# Patient Record
Sex: MALE | URBAN - NONMETROPOLITAN AREA
[De-identification: names, ages, dates, MRNs, and addresses within clinical notes are randomized per-mention and may not be internally consistent; named-entity substitution may affect disease eponyms.]

---

## 2017-01-31 ENCOUNTER — APPOINTMENT (RX ONLY)
Dept: URBAN - NONMETROPOLITAN AREA CLINIC 1 | Facility: CLINIC | Age: 77
Setting detail: DERMATOLOGY
End: 2017-01-31

## 2017-01-31 VITALS — HEIGHT: 70 IN | WEIGHT: 198 LBS

## 2017-01-31 DIAGNOSIS — L57.0 ACTINIC KERATOSIS: ICD-10-CM

## 2017-01-31 DIAGNOSIS — L57.8 OTHER SKIN CHANGES DUE TO CHRONIC EXPOSURE TO NONIONIZING RADIATION: ICD-10-CM

## 2017-01-31 DIAGNOSIS — L82.1 OTHER SEBORRHEIC KERATOSIS: ICD-10-CM

## 2017-01-31 DIAGNOSIS — L81.4 OTHER MELANIN HYPERPIGMENTATION: ICD-10-CM

## 2017-01-31 PROBLEM — I63.50 CEREBRAL INFARCTION DUE TO UNSPECIFIED OCCLUSION OR STENOSIS OF UNSPECIFIED CEREBRAL ARTERY: Status: ACTIVE | Noted: 2017-01-31

## 2017-01-31 PROCEDURE — ? LIQUID NITROGEN

## 2017-01-31 PROCEDURE — 99203 OFFICE O/P NEW LOW 30 MIN: CPT | Mod: 25

## 2017-01-31 PROCEDURE — ? DEFER

## 2017-01-31 PROCEDURE — 17003 DESTRUCT PREMALG LES 2-14: CPT

## 2017-01-31 PROCEDURE — ? PRESCRIPTION

## 2017-01-31 PROCEDURE — 17000 DESTRUCT PREMALG LESION: CPT

## 2017-01-31 PROCEDURE — ? COUNSELING

## 2017-01-31 RX ORDER — FLUOROURACIL 2 G/40G
CREAM TOPICAL BID
Qty: 1 | Refills: 1 | Status: ERX | COMMUNITY
Start: 2017-01-31

## 2017-01-31 RX ADMIN — FLUOROURACIL: 2 CREAM TOPICAL at 19:23

## 2017-01-31 ASSESSMENT — LOCATION DETAILED DESCRIPTION DERM
LOCATION DETAILED: RIGHT SUPERIOR UPPER BACK
LOCATION DETAILED: RIGHT LATERAL EYEBROW
LOCATION DETAILED: RIGHT LATERAL MALAR CHEEK
LOCATION DETAILED: LEFT LATERAL MALAR CHEEK
LOCATION DETAILED: LEFT LATERAL FOREHEAD
LOCATION DETAILED: RIGHT FOREHEAD
LOCATION DETAILED: LEFT LATERAL SUPERIOR CHEST
LOCATION DETAILED: LEFT INFERIOR CENTRAL MALAR CHEEK
LOCATION DETAILED: RIGHT INFERIOR TEMPLE
LOCATION DETAILED: LEFT MID TEMPLE

## 2017-01-31 ASSESSMENT — LOCATION ZONE DERM
LOCATION ZONE: TRUNK
LOCATION ZONE: FACE

## 2017-01-31 ASSESSMENT — LOCATION SIMPLE DESCRIPTION DERM
LOCATION SIMPLE: LEFT FOREHEAD
LOCATION SIMPLE: LEFT TEMPLE
LOCATION SIMPLE: LEFT CHEEK
LOCATION SIMPLE: RIGHT CHEEK
LOCATION SIMPLE: RIGHT EYEBROW
LOCATION SIMPLE: RIGHT FOREHEAD
LOCATION SIMPLE: RIGHT UPPER BACK
LOCATION SIMPLE: CHEST
LOCATION SIMPLE: RIGHT TEMPLE

## 2017-01-31 NOTE — PROCEDURE: LIQUID NITROGEN
Detail Level: Simple
Post-Care Instructions: I reviewed with the patient in detail post-care instructions. Patient is to wear sunprotection, and avoid picking at any of the treated lesions. Pt may apply Vaseline to crusted or scabbing areas.
Duration Of Freeze Thaw-Cycle (Seconds): 0
Render Post-Care Instructions In Note?: no
Consent: The patient's consent was obtained including but not limited to risks of crusting, scabbing, blistering, scarring, darker or lighter pigmentary change, recurrence, incomplete removal and infection.
Number Of Freeze-Thaw Cycles: 1 freeze-thaw cycle
Medical Necessity Information: It is in your best interest to select a reason for this procedure from the list below. All of these items fulfill various CMS LCD requirements except the new and changing color options.
Medical Necessity Clause: This procedure was medically necessary because the lesions that were treated were: not resolved
Detail Level: Detailed

## 2017-01-31 NOTE — PROCEDURE: MIPS QUALITY
Detail Level: Generalized
Quality 111:Pneumonia Vaccination Status For Older Adults: Pneumococcal Vaccination Previously Received
Quality 110: Preventive Care And Screening: Influenza Immunization: Influenza Immunization Administered during Influenza season

## 2021-01-13 DIAGNOSIS — Z23 NEED FOR VACCINATION: ICD-10-CM

## 2021-03-12 ENCOUNTER — APPOINTMENT (OUTPATIENT)
Dept: RADIOLOGY | Facility: MEDICAL CENTER | Age: 81
DRG: 247 | End: 2021-03-12
Attending: INTERNAL MEDICINE
Payer: MEDICARE

## 2021-03-12 ENCOUNTER — APPOINTMENT (OUTPATIENT)
Dept: RADIOLOGY | Facility: MEDICAL CENTER | Age: 81
DRG: 247 | End: 2021-03-12
Attending: EMERGENCY MEDICINE
Payer: MEDICARE

## 2021-03-12 ENCOUNTER — HOSPITAL ENCOUNTER (INPATIENT)
Facility: MEDICAL CENTER | Age: 81
LOS: 3 days | DRG: 247 | End: 2021-03-15
Attending: EMERGENCY MEDICINE | Admitting: HOSPITALIST
Payer: MEDICARE

## 2021-03-12 DIAGNOSIS — I25.10 CORONARY ARTERIOSCLEROSIS: ICD-10-CM

## 2021-03-12 DIAGNOSIS — N17.9 AKI (ACUTE KIDNEY INJURY) (HCC): ICD-10-CM

## 2021-03-12 PROBLEM — N18.31 STAGE 3A CHRONIC KIDNEY DISEASE: Status: ACTIVE | Noted: 2021-03-12

## 2021-03-12 PROBLEM — R73.9 HYPERGLYCEMIA: Status: ACTIVE | Noted: 2021-03-12

## 2021-03-12 PROBLEM — I21.19 ACUTE ST ELEVATION MYOCARDIAL INFARCTION (STEMI) OF INFERIOR WALL (HCC): Status: ACTIVE | Noted: 2021-03-12

## 2021-03-12 LAB
ALBUMIN SERPL BCP-MCNC: 4 G/DL (ref 3.2–4.9)
ALBUMIN SERPL BCP-MCNC: 4.1 G/DL (ref 3.2–4.9)
ALBUMIN/GLOB SERPL: 1.2 G/DL
ALBUMIN/GLOB SERPL: 1.3 G/DL
ALP SERPL-CCNC: 58 U/L (ref 30–99)
ALP SERPL-CCNC: 59 U/L (ref 30–99)
ALT SERPL-CCNC: 14 U/L (ref 2–50)
ALT SERPL-CCNC: 15 U/L (ref 2–50)
ANION GAP SERPL CALC-SCNC: 11 MMOL/L (ref 7–16)
ANION GAP SERPL CALC-SCNC: 13 MMOL/L (ref 7–16)
APTT PPP: 31.3 SEC (ref 24.7–36)
APTT PPP: 51.1 SEC (ref 24.7–36)
APTT PPP: >240 SEC (ref 24.7–36)
AST SERPL-CCNC: 20 U/L (ref 12–45)
AST SERPL-CCNC: 31 U/L (ref 12–45)
BASOPHILS # BLD AUTO: 0.7 % (ref 0–1.8)
BASOPHILS # BLD: 0.07 K/UL (ref 0–0.12)
BILIRUB SERPL-MCNC: 0.3 MG/DL (ref 0.1–1.5)
BILIRUB SERPL-MCNC: 0.4 MG/DL (ref 0.1–1.5)
BUN SERPL-MCNC: 24 MG/DL (ref 8–22)
BUN SERPL-MCNC: 25 MG/DL (ref 8–22)
CALCIUM SERPL-MCNC: 9.5 MG/DL (ref 8.5–10.5)
CALCIUM SERPL-MCNC: 9.6 MG/DL (ref 8.5–10.5)
CHLORIDE SERPL-SCNC: 102 MMOL/L (ref 96–112)
CHLORIDE SERPL-SCNC: 104 MMOL/L (ref 96–112)
CO2 SERPL-SCNC: 23 MMOL/L (ref 20–33)
CO2 SERPL-SCNC: 23 MMOL/L (ref 20–33)
CREAT SERPL-MCNC: 1.4 MG/DL (ref 0.5–1.4)
CREAT SERPL-MCNC: 1.42 MG/DL (ref 0.5–1.4)
EKG IMPRESSION: NORMAL
EKG IMPRESSION: NORMAL
EOSINOPHIL # BLD AUTO: 0.09 K/UL (ref 0–0.51)
EOSINOPHIL NFR BLD: 0.9 % (ref 0–6.9)
ERYTHROCYTE [DISTWIDTH] IN BLOOD BY AUTOMATED COUNT: 42 FL (ref 35.9–50)
ERYTHROCYTE [DISTWIDTH] IN BLOOD BY AUTOMATED COUNT: 42.2 FL (ref 35.9–50)
EST. AVERAGE GLUCOSE BLD GHB EST-MCNC: 120 MG/DL
GLOBULIN SER CALC-MCNC: 3.1 G/DL (ref 1.9–3.5)
GLOBULIN SER CALC-MCNC: 3.3 G/DL (ref 1.9–3.5)
GLUCOSE SERPL-MCNC: 116 MG/DL (ref 65–99)
GLUCOSE SERPL-MCNC: 124 MG/DL (ref 65–99)
HBA1C MFR BLD: 5.8 % (ref 4–5.6)
HCT VFR BLD AUTO: 43.6 % (ref 42–52)
HCT VFR BLD AUTO: 45 % (ref 42–52)
HGB BLD-MCNC: 14.4 G/DL (ref 14–18)
HGB BLD-MCNC: 14.5 G/DL (ref 14–18)
IMM GRANULOCYTES # BLD AUTO: 0.04 K/UL (ref 0–0.11)
IMM GRANULOCYTES NFR BLD AUTO: 0.4 % (ref 0–0.9)
INR PPP: 1.04 (ref 0.87–1.13)
INR PPP: 1.11 (ref 0.87–1.13)
INR PPP: 1.16 (ref 0.87–1.13)
LYMPHOCYTES # BLD AUTO: 1.12 K/UL (ref 1–4.8)
LYMPHOCYTES NFR BLD: 10.6 % (ref 22–41)
MAGNESIUM SERPL-MCNC: 2.2 MG/DL (ref 1.5–2.5)
MCH RBC QN AUTO: 27.8 PG (ref 27–33)
MCH RBC QN AUTO: 28.2 PG (ref 27–33)
MCHC RBC AUTO-ENTMCNC: 32.2 G/DL (ref 33.7–35.3)
MCHC RBC AUTO-ENTMCNC: 33 G/DL (ref 33.7–35.3)
MCV RBC AUTO: 85.5 FL (ref 81.4–97.8)
MCV RBC AUTO: 86.4 FL (ref 81.4–97.8)
MONOCYTES # BLD AUTO: 0.35 K/UL (ref 0–0.85)
MONOCYTES NFR BLD AUTO: 3.3 % (ref 0–13.4)
NEUTROPHILS # BLD AUTO: 8.85 K/UL (ref 1.82–7.42)
NEUTROPHILS NFR BLD: 84.1 % (ref 44–72)
NRBC # BLD AUTO: 0 K/UL
NRBC BLD-RTO: 0 /100 WBC
PLATELET # BLD AUTO: 229 K/UL (ref 164–446)
PLATELET # BLD AUTO: 257 K/UL (ref 164–446)
PMV BLD AUTO: 10.4 FL (ref 9–12.9)
PMV BLD AUTO: 11 FL (ref 9–12.9)
POTASSIUM SERPL-SCNC: 4.3 MMOL/L (ref 3.6–5.5)
POTASSIUM SERPL-SCNC: 5.2 MMOL/L (ref 3.6–5.5)
PROT SERPL-MCNC: 7.2 G/DL (ref 6–8.2)
PROT SERPL-MCNC: 7.3 G/DL (ref 6–8.2)
PROTHROMBIN TIME: 13.9 SEC (ref 12–14.6)
PROTHROMBIN TIME: 14.6 SEC (ref 12–14.6)
PROTHROMBIN TIME: 15.2 SEC (ref 12–14.6)
RBC # BLD AUTO: 5.1 M/UL (ref 4.7–6.1)
RBC # BLD AUTO: 5.21 M/UL (ref 4.7–6.1)
SARS-COV+SARS-COV-2 AG RESP QL IA.RAPID: NOTDETECTED
SARS-COV-2 RNA RESP QL NAA+PROBE: NOTDETECTED
SODIUM SERPL-SCNC: 138 MMOL/L (ref 135–145)
SODIUM SERPL-SCNC: 138 MMOL/L (ref 135–145)
SPECIMEN SOURCE: NORMAL
SPECIMEN SOURCE: NORMAL
TROPONIN T SERPL-MCNC: 29 NG/L (ref 6–19)
TROPONIN T SERPL-MCNC: 73 NG/L (ref 6–19)
UFH PPP CHRO-ACNC: <0.1 IU/ML
UFH PPP CHRO-ACNC: <0.1 IU/ML
UFH PPP CHRO-ACNC: >1.1 IU/ML
WBC # BLD AUTO: 10.5 K/UL (ref 4.8–10.8)
WBC # BLD AUTO: 10.5 K/UL (ref 4.8–10.8)

## 2021-03-12 PROCEDURE — U0003 INFECTIOUS AGENT DETECTION BY NUCLEIC ACID (DNA OR RNA); SEVERE ACUTE RESPIRATORY SYNDROME CORONAVIRUS 2 (SARS-COV-2) (CORONAVIRUS DISEASE [COVID-19]), AMPLIFIED PROBE TECHNIQUE, MAKING USE OF HIGH THROUGHPUT TECHNOLOGIES AS DESCRIBED BY CMS-2020-01-R: HCPCS

## 2021-03-12 PROCEDURE — 700111 HCHG RX REV CODE 636 W/ 250 OVERRIDE (IP): Performed by: INTERNAL MEDICINE

## 2021-03-12 PROCEDURE — 85025 COMPLETE CBC W/AUTO DIFF WBC: CPT

## 2021-03-12 PROCEDURE — 700105 HCHG RX REV CODE 258: Performed by: NURSE PRACTITIONER

## 2021-03-12 PROCEDURE — 93005 ELECTROCARDIOGRAM TRACING: CPT | Performed by: EMERGENCY MEDICINE

## 2021-03-12 PROCEDURE — U0005 INFEC AGEN DETEC AMPLI PROBE: HCPCS

## 2021-03-12 PROCEDURE — 770020 HCHG ROOM/CARE - TELE (206)

## 2021-03-12 PROCEDURE — 99223 1ST HOSP IP/OBS HIGH 75: CPT | Mod: AI | Performed by: HOSPITALIST

## 2021-03-12 PROCEDURE — 85610 PROTHROMBIN TIME: CPT

## 2021-03-12 PROCEDURE — 87426 SARSCOV CORONAVIRUS AG IA: CPT

## 2021-03-12 PROCEDURE — 84484 ASSAY OF TROPONIN QUANT: CPT

## 2021-03-12 PROCEDURE — 99223 1ST HOSP IP/OBS HIGH 75: CPT | Performed by: INTERNAL MEDICINE

## 2021-03-12 PROCEDURE — 85027 COMPLETE CBC AUTOMATED: CPT

## 2021-03-12 PROCEDURE — 96366 THER/PROPH/DIAG IV INF ADDON: CPT

## 2021-03-12 PROCEDURE — 71045 X-RAY EXAM CHEST 1 VIEW: CPT

## 2021-03-12 PROCEDURE — 80053 COMPREHEN METABOLIC PANEL: CPT

## 2021-03-12 PROCEDURE — 96365 THER/PROPH/DIAG IV INF INIT: CPT

## 2021-03-12 PROCEDURE — 83036 HEMOGLOBIN GLYCOSYLATED A1C: CPT

## 2021-03-12 PROCEDURE — 85730 THROMBOPLASTIN TIME PARTIAL: CPT

## 2021-03-12 PROCEDURE — 36415 COLL VENOUS BLD VENIPUNCTURE: CPT

## 2021-03-12 PROCEDURE — 93005 ELECTROCARDIOGRAM TRACING: CPT | Performed by: HOSPITALIST

## 2021-03-12 PROCEDURE — C9803 HOPD COVID-19 SPEC COLLECT: HCPCS | Performed by: EMERGENCY MEDICINE

## 2021-03-12 PROCEDURE — 99285 EMERGENCY DEPT VISIT HI MDM: CPT

## 2021-03-12 PROCEDURE — 83735 ASSAY OF MAGNESIUM: CPT

## 2021-03-12 PROCEDURE — 85520 HEPARIN ASSAY: CPT

## 2021-03-12 RX ORDER — AMOXICILLIN 250 MG
2 CAPSULE ORAL 2 TIMES DAILY
Status: DISCONTINUED | OUTPATIENT
Start: 2021-03-12 | End: 2021-03-15 | Stop reason: HOSPADM

## 2021-03-12 RX ORDER — BISACODYL 10 MG
10 SUPPOSITORY, RECTAL RECTAL
Status: DISCONTINUED | OUTPATIENT
Start: 2021-03-12 | End: 2021-03-15 | Stop reason: HOSPADM

## 2021-03-12 RX ORDER — ASPIRIN 300 MG/1
300 SUPPOSITORY RECTAL DAILY
Status: DISCONTINUED | OUTPATIENT
Start: 2021-03-13 | End: 2021-03-12

## 2021-03-12 RX ORDER — MORPHINE SULFATE 4 MG/ML
2-4 INJECTION, SOLUTION INTRAMUSCULAR; INTRAVENOUS
Status: DISCONTINUED | OUTPATIENT
Start: 2021-03-12 | End: 2021-03-15 | Stop reason: HOSPADM

## 2021-03-12 RX ORDER — ONDANSETRON 2 MG/ML
4 INJECTION INTRAMUSCULAR; INTRAVENOUS EVERY 4 HOURS PRN
Status: DISCONTINUED | OUTPATIENT
Start: 2021-03-12 | End: 2021-03-15 | Stop reason: HOSPADM

## 2021-03-12 RX ORDER — CEFADROXIL 500 MG/1
500 CAPSULE ORAL EVERY 12 HOURS
Status: DISCONTINUED | OUTPATIENT
Start: 2021-03-12 | End: 2021-03-12

## 2021-03-12 RX ORDER — HEPARIN SODIUM 1000 [USP'U]/ML
2000 INJECTION, SOLUTION INTRAVENOUS; SUBCUTANEOUS PRN
Status: DISCONTINUED | OUTPATIENT
Start: 2021-03-12 | End: 2021-03-14

## 2021-03-12 RX ORDER — ACETAMINOPHEN 325 MG/1
650 TABLET ORAL EVERY 6 HOURS PRN
Status: DISCONTINUED | OUTPATIENT
Start: 2021-03-12 | End: 2021-03-15 | Stop reason: HOSPADM

## 2021-03-12 RX ORDER — TAMSULOSIN HYDROCHLORIDE 0.4 MG/1
0.4 CAPSULE ORAL 2 TIMES DAILY
Status: DISCONTINUED | OUTPATIENT
Start: 2021-03-12 | End: 2021-03-15 | Stop reason: HOSPADM

## 2021-03-12 RX ORDER — POLYETHYLENE GLYCOL 3350 17 G/17G
1 POWDER, FOR SOLUTION ORAL
Status: DISCONTINUED | OUTPATIENT
Start: 2021-03-12 | End: 2021-03-15 | Stop reason: HOSPADM

## 2021-03-12 RX ORDER — ONDANSETRON 4 MG/1
4 TABLET, ORALLY DISINTEGRATING ORAL EVERY 4 HOURS PRN
Status: DISCONTINUED | OUTPATIENT
Start: 2021-03-12 | End: 2021-03-15 | Stop reason: HOSPADM

## 2021-03-12 RX ORDER — MULTIVIT-MIN/IRON/FOLIC ACID/K 18-600-40
2000 CAPSULE ORAL DAILY
COMMUNITY

## 2021-03-12 RX ORDER — HEPARIN SODIUM 5000 [USP'U]/100ML
0-30 INJECTION, SOLUTION INTRAVENOUS CONTINUOUS
Status: DISCONTINUED | OUTPATIENT
Start: 2021-03-12 | End: 2021-03-14

## 2021-03-12 RX ORDER — SODIUM CHLORIDE 9 MG/ML
INJECTION, SOLUTION INTRAVENOUS CONTINUOUS
Status: DISCONTINUED | OUTPATIENT
Start: 2021-03-12 | End: 2021-03-13

## 2021-03-12 RX ORDER — CARVEDILOL 6.25 MG/1
6.25 TABLET ORAL 2 TIMES DAILY WITH MEALS
Status: DISCONTINUED | OUTPATIENT
Start: 2021-03-12 | End: 2021-03-15 | Stop reason: HOSPADM

## 2021-03-12 RX ORDER — ATORVASTATIN CALCIUM 20 MG/1
80 TABLET, FILM COATED ORAL EVERY EVENING
Status: DISCONTINUED | OUTPATIENT
Start: 2021-03-12 | End: 2021-03-12

## 2021-03-12 RX ORDER — ROSUVASTATIN CALCIUM 20 MG/1
40 TABLET, COATED ORAL EVERY EVENING
Status: DISCONTINUED | OUTPATIENT
Start: 2021-03-13 | End: 2021-03-15 | Stop reason: HOSPADM

## 2021-03-12 RX ORDER — ASPIRIN 325 MG
325 TABLET ORAL DAILY
Status: DISCONTINUED | OUTPATIENT
Start: 2021-03-13 | End: 2021-03-12

## 2021-03-12 RX ORDER — CEPHALEXIN 500 MG/1
500 CAPSULE ORAL 4 TIMES DAILY
Status: DISCONTINUED | OUTPATIENT
Start: 2021-03-12 | End: 2021-03-13

## 2021-03-12 RX ORDER — TAMSULOSIN HYDROCHLORIDE 0.4 MG/1
0.4 CAPSULE ORAL 2 TIMES DAILY
COMMUNITY
Start: 2021-01-19

## 2021-03-12 RX ORDER — ASPIRIN 81 MG/1
324 TABLET, CHEWABLE ORAL DAILY
Status: DISCONTINUED | OUTPATIENT
Start: 2021-03-13 | End: 2021-03-12

## 2021-03-12 RX ORDER — CEFADROXIL 500 MG/1
500 CAPSULE ORAL EVERY 12 HOURS
Status: ON HOLD | COMMUNITY
Start: 2021-03-11 | End: 2021-03-15

## 2021-03-12 RX ORDER — NITROGLYCERIN 0.4 MG/1
0.4 TABLET SUBLINGUAL
Status: DISCONTINUED | OUTPATIENT
Start: 2021-03-12 | End: 2021-03-15 | Stop reason: HOSPADM

## 2021-03-12 RX ADMIN — HEPARIN SODIUM 2000 UNITS: 1000 INJECTION, SOLUTION INTRAVENOUS; SUBCUTANEOUS at 23:12

## 2021-03-12 RX ADMIN — SODIUM CHLORIDE: 9 INJECTION, SOLUTION INTRAVENOUS at 21:43

## 2021-03-12 RX ADMIN — HEPARIN SODIUM 11.72 UNITS/KG/HR: 5000 INJECTION, SOLUTION INTRAVENOUS; SUBCUTANEOUS at 16:08

## 2021-03-12 RX ADMIN — HEPARIN SODIUM 15.7 UNITS/KG/HR: 5000 INJECTION, SOLUTION INTRAVENOUS; SUBCUTANEOUS at 23:13

## 2021-03-12 ASSESSMENT — ENCOUNTER SYMPTOMS
SHORTNESS OF BREATH: 0
DIARRHEA: 0
SORE THROAT: 0
LOSS OF CONSCIOUSNESS: 0
HEADACHES: 0
CHILLS: 0
PALPITATIONS: 0
COUGH: 0
BACK PAIN: 0
VOMITING: 0
DIZZINESS: 0
NAUSEA: 0
ABDOMINAL PAIN: 0
BLURRED VISION: 0
DOUBLE VISION: 0
FEVER: 0

## 2021-03-12 ASSESSMENT — PATIENT HEALTH QUESTIONNAIRE - PHQ9
SUM OF ALL RESPONSES TO PHQ9 QUESTIONS 1 AND 2: 0
2. FEELING DOWN, DEPRESSED, IRRITABLE, OR HOPELESS: NOT AT ALL
1. LITTLE INTEREST OR PLEASURE IN DOING THINGS: NOT AT ALL

## 2021-03-12 ASSESSMENT — COGNITIVE AND FUNCTIONAL STATUS - GENERAL
SUGGESTED CMS G CODE MODIFIER DAILY ACTIVITY: CH
SUGGESTED CMS G CODE MODIFIER MOBILITY: CH
DAILY ACTIVITIY SCORE: 24
MOBILITY SCORE: 24

## 2021-03-12 ASSESSMENT — LIFESTYLE VARIABLES
ALCOHOL_USE: YES
TOTAL SCORE: 0
DOES PATIENT WANT TO STOP DRINKING: NO
HAVE YOU EVER FELT YOU SHOULD CUT DOWN ON YOUR DRINKING: NO
HAVE PEOPLE ANNOYED YOU BY CRITICIZING YOUR DRINKING: NO
EVER HAD A DRINK FIRST THING IN THE MORNING TO STEADY YOUR NERVES TO GET RID OF A HANGOVER: NO
ON A TYPICAL DAY WHEN YOU DRINK ALCOHOL HOW MANY DRINKS DO YOU HAVE: 1
HOW MANY TIMES IN THE PAST YEAR HAVE YOU HAD 5 OR MORE DRINKS IN A DAY: 0
AVERAGE NUMBER OF DAYS PER WEEK YOU HAVE A DRINK CONTAINING ALCOHOL: 3
TOTAL SCORE: 0
EVER FELT BAD OR GUILTY ABOUT YOUR DRINKING: NO
TOTAL SCORE: 0
CONSUMPTION TOTAL: NEGATIVE

## 2021-03-12 ASSESSMENT — FIBROSIS 4 INDEX
FIB4 SCORE: 1.63
FIB4 SCORE: 1.63

## 2021-03-12 ASSESSMENT — PAIN DESCRIPTION - PAIN TYPE: TYPE: ACUTE PAIN

## 2021-03-12 NOTE — ED TRIAGE NOTES
"Chief Complaint   Patient presents with   • Chest Pain     Pt started having chest pain after he was working out. Pt drove to [facility] where they did a chest pain workup discovering a inferior STEMI. Pt was then transferred here for IR. Pt is currently chest pain free. Pt recieved metoprolol, aspirin, nitro x3 and heparin. Cardiology and ERP at bedside upon arrival.      BP (!) 173/91   Pulse 67   Temp 37.1 °C (98.8 °F) (Temporal)   Resp 16   Ht 1.74 m (5' 8.5\")   Wt 85.3 kg (188 lb)   SpO2 93%   BMI 28.17 kg/m²     Patient was a transfer from [facility]. Pt here for inferior STEMI. Pt not having chest pain so per cardiologist, pt will got to IR tomorrow unless chest pain returns.   "

## 2021-03-13 ENCOUNTER — APPOINTMENT (OUTPATIENT)
Dept: CARDIOLOGY | Facility: MEDICAL CENTER | Age: 81
DRG: 247 | End: 2021-03-13
Attending: HOSPITALIST
Payer: MEDICARE

## 2021-03-13 ENCOUNTER — APPOINTMENT (OUTPATIENT)
Dept: CARDIOLOGY | Facility: MEDICAL CENTER | Age: 81
DRG: 247 | End: 2021-03-13
Attending: NURSE PRACTITIONER
Payer: MEDICARE

## 2021-03-13 PROBLEM — N17.9 AKI (ACUTE KIDNEY INJURY) (HCC): Status: ACTIVE | Noted: 2021-03-13

## 2021-03-13 LAB
ANION GAP SERPL CALC-SCNC: 12 MMOL/L (ref 7–16)
BUN SERPL-MCNC: 27 MG/DL (ref 8–22)
CALCIUM SERPL-MCNC: 9.6 MG/DL (ref 8.5–10.5)
CHLORIDE SERPL-SCNC: 105 MMOL/L (ref 96–112)
CHOLEST SERPL-MCNC: 159 MG/DL (ref 100–199)
CO2 SERPL-SCNC: 22 MMOL/L (ref 20–33)
CREAT SERPL-MCNC: 1.47 MG/DL (ref 0.5–1.4)
EKG IMPRESSION: NORMAL
ERYTHROCYTE [DISTWIDTH] IN BLOOD BY AUTOMATED COUNT: 40.9 FL (ref 35.9–50)
GLUCOSE SERPL-MCNC: 99 MG/DL (ref 65–99)
HCT VFR BLD AUTO: 42 % (ref 42–52)
HDLC SERPL-MCNC: 45 MG/DL
HGB BLD-MCNC: 14 G/DL (ref 14–18)
LDLC SERPL CALC-MCNC: 91 MG/DL
LV EJECT FRACT  99904: 60
LV EJECT FRACT MOD 2C 99903: 49.48
LV EJECT FRACT MOD 4C 99902: 75.24
LV EJECT FRACT MOD BP 99901: 63.18
MCH RBC QN AUTO: 27.8 PG (ref 27–33)
MCHC RBC AUTO-ENTMCNC: 33.3 G/DL (ref 33.7–35.3)
MCV RBC AUTO: 83.3 FL (ref 81.4–97.8)
PLATELET # BLD AUTO: 241 K/UL (ref 164–446)
PMV BLD AUTO: 10.6 FL (ref 9–12.9)
POTASSIUM SERPL-SCNC: 3.9 MMOL/L (ref 3.6–5.5)
RBC # BLD AUTO: 5.04 M/UL (ref 4.7–6.1)
SODIUM SERPL-SCNC: 139 MMOL/L (ref 135–145)
TRIGL SERPL-MCNC: 116 MG/DL (ref 0–149)
TROPONIN T SERPL-MCNC: 280 NG/L (ref 6–19)
UFH PPP CHRO-ACNC: 0.48 IU/ML
UFH PPP CHRO-ACNC: 0.84 IU/ML
UFH PPP CHRO-ACNC: <0.1 IU/ML
WBC # BLD AUTO: 7 K/UL (ref 4.8–10.8)

## 2021-03-13 PROCEDURE — 99232 SBSQ HOSP IP/OBS MODERATE 35: CPT | Performed by: STUDENT IN AN ORGANIZED HEALTH CARE EDUCATION/TRAINING PROGRAM

## 2021-03-13 PROCEDURE — 85027 COMPLETE CBC AUTOMATED: CPT

## 2021-03-13 PROCEDURE — 93005 ELECTROCARDIOGRAM TRACING: CPT | Performed by: INTERNAL MEDICINE

## 2021-03-13 PROCEDURE — 770020 HCHG ROOM/CARE - TELE (206)

## 2021-03-13 PROCEDURE — 93010 ELECTROCARDIOGRAM REPORT: CPT | Mod: 59 | Performed by: INTERNAL MEDICINE

## 2021-03-13 PROCEDURE — 700102 HCHG RX REV CODE 250 W/ 637 OVERRIDE(OP): Performed by: HOSPITALIST

## 2021-03-13 PROCEDURE — 92928 PRQ TCAT PLMT NTRAC ST 1 LES: CPT | Mod: LC | Performed by: INTERNAL MEDICINE

## 2021-03-13 PROCEDURE — 93010 ELECTROCARDIOGRAM REPORT: CPT | Mod: 59,76 | Performed by: INTERNAL MEDICINE

## 2021-03-13 PROCEDURE — 99232 SBSQ HOSP IP/OBS MODERATE 35: CPT | Mod: 25 | Performed by: INTERNAL MEDICINE

## 2021-03-13 PROCEDURE — 93306 TTE W/DOPPLER COMPLETE: CPT | Mod: 26 | Performed by: INTERNAL MEDICINE

## 2021-03-13 PROCEDURE — 700117 HCHG RX CONTRAST REV CODE 255: Performed by: INTERNAL MEDICINE

## 2021-03-13 PROCEDURE — 700111 HCHG RX REV CODE 636 W/ 250 OVERRIDE (IP)

## 2021-03-13 PROCEDURE — B2111ZZ FLUOROSCOPY OF MULTIPLE CORONARY ARTERIES USING LOW OSMOLAR CONTRAST: ICD-10-PCS | Performed by: INTERNAL MEDICINE

## 2021-03-13 PROCEDURE — 93458 L HRT ARTERY/VENTRICLE ANGIO: CPT | Mod: 26,59 | Performed by: INTERNAL MEDICINE

## 2021-03-13 PROCEDURE — 94760 N-INVAS EAR/PLS OXIMETRY 1: CPT

## 2021-03-13 PROCEDURE — B2151ZZ FLUOROSCOPY OF LEFT HEART USING LOW OSMOLAR CONTRAST: ICD-10-PCS | Performed by: INTERNAL MEDICINE

## 2021-03-13 PROCEDURE — 4A023N7 MEASUREMENT OF CARDIAC SAMPLING AND PRESSURE, LEFT HEART, PERCUTANEOUS APPROACH: ICD-10-PCS | Performed by: INTERNAL MEDICINE

## 2021-03-13 PROCEDURE — A9270 NON-COVERED ITEM OR SERVICE: HCPCS | Performed by: INTERNAL MEDICINE

## 2021-03-13 PROCEDURE — A9270 NON-COVERED ITEM OR SERVICE: HCPCS | Performed by: HOSPITALIST

## 2021-03-13 PROCEDURE — 84484 ASSAY OF TROPONIN QUANT: CPT

## 2021-03-13 PROCEDURE — 93005 ELECTROCARDIOGRAM TRACING: CPT | Performed by: STUDENT IN AN ORGANIZED HEALTH CARE EDUCATION/TRAINING PROGRAM

## 2021-03-13 PROCEDURE — 99153 MOD SED SAME PHYS/QHP EA: CPT

## 2021-03-13 PROCEDURE — 85520 HEPARIN ASSAY: CPT | Mod: 91

## 2021-03-13 PROCEDURE — 80048 BASIC METABOLIC PNL TOTAL CA: CPT

## 2021-03-13 PROCEDURE — 027034Z DILATION OF CORONARY ARTERY, ONE ARTERY WITH DRUG-ELUTING INTRALUMINAL DEVICE, PERCUTANEOUS APPROACH: ICD-10-PCS | Performed by: INTERNAL MEDICINE

## 2021-03-13 PROCEDURE — 80061 LIPID PANEL: CPT

## 2021-03-13 PROCEDURE — 36415 COLL VENOUS BLD VENIPUNCTURE: CPT

## 2021-03-13 PROCEDURE — 700102 HCHG RX REV CODE 250 W/ 637 OVERRIDE(OP): Performed by: INTERNAL MEDICINE

## 2021-03-13 PROCEDURE — 700101 HCHG RX REV CODE 250

## 2021-03-13 PROCEDURE — 93306 TTE W/DOPPLER COMPLETE: CPT

## 2021-03-13 PROCEDURE — 99152 MOD SED SAME PHYS/QHP 5/>YRS: CPT | Performed by: INTERNAL MEDICINE

## 2021-03-13 RX ORDER — LIDOCAINE HYDROCHLORIDE 20 MG/ML
INJECTION, SOLUTION INFILTRATION; PERINEURAL
Status: COMPLETED
Start: 2021-03-13 | End: 2021-03-13

## 2021-03-13 RX ORDER — HEPARIN SODIUM 200 [USP'U]/100ML
INJECTION, SOLUTION INTRAVENOUS
Status: COMPLETED
Start: 2021-03-13 | End: 2021-03-13

## 2021-03-13 RX ORDER — HEPARIN SODIUM 1000 [USP'U]/ML
INJECTION, SOLUTION INTRAVENOUS; SUBCUTANEOUS
Status: COMPLETED
Start: 2021-03-13 | End: 2021-03-13

## 2021-03-13 RX ORDER — BIVALIRUDIN 250 MG/5ML
INJECTION, POWDER, LYOPHILIZED, FOR SOLUTION INTRAVENOUS
Status: COMPLETED
Start: 2021-03-13 | End: 2021-03-13

## 2021-03-13 RX ORDER — MIDAZOLAM HYDROCHLORIDE 1 MG/ML
INJECTION INTRAMUSCULAR; INTRAVENOUS
Status: COMPLETED
Start: 2021-03-13 | End: 2021-03-13

## 2021-03-13 RX ORDER — SODIUM CHLORIDE, SODIUM LACTATE, POTASSIUM CHLORIDE, CALCIUM CHLORIDE 600; 310; 30; 20 MG/100ML; MG/100ML; MG/100ML; MG/100ML
1000 INJECTION, SOLUTION INTRAVENOUS CONTINUOUS
Status: DISCONTINUED | OUTPATIENT
Start: 2021-03-13 | End: 2021-03-13

## 2021-03-13 RX ORDER — VERAPAMIL HYDROCHLORIDE 2.5 MG/ML
INJECTION, SOLUTION INTRAVENOUS
Status: COMPLETED
Start: 2021-03-13 | End: 2021-03-13

## 2021-03-13 RX ADMIN — TICAGRELOR 180 MG: 90 TABLET ORAL at 00:42

## 2021-03-13 RX ADMIN — TAMSULOSIN HYDROCHLORIDE 0.4 MG: 0.4 CAPSULE ORAL at 07:07

## 2021-03-13 RX ADMIN — HEPARIN SODIUM: 1000 INJECTION, SOLUTION INTRAVENOUS; SUBCUTANEOUS at 10:05

## 2021-03-13 RX ADMIN — CARVEDILOL 6.25 MG: 6.25 TABLET, FILM COATED ORAL at 07:07

## 2021-03-13 RX ADMIN — MIDAZOLAM HYDROCHLORIDE 0.5 MG: 1 INJECTION, SOLUTION INTRAMUSCULAR; INTRAVENOUS at 11:09

## 2021-03-13 RX ADMIN — TAMSULOSIN HYDROCHLORIDE 0.4 MG: 0.4 CAPSULE ORAL at 17:10

## 2021-03-13 RX ADMIN — BIVALIRUDIN 250 MG: 250 INJECTION, POWDER, LYOPHILIZED, FOR SOLUTION INTRAVENOUS at 10:26

## 2021-03-13 RX ADMIN — MIDAZOLAM HYDROCHLORIDE 2 MG: 1 INJECTION, SOLUTION INTRAMUSCULAR; INTRAVENOUS at 10:10

## 2021-03-13 RX ADMIN — CEPHALEXIN 500 MG: 500 CAPSULE ORAL at 07:08

## 2021-03-13 RX ADMIN — ASPIRIN 81 MG: 81 TABLET, COATED ORAL at 07:10

## 2021-03-13 RX ADMIN — FENTANYL CITRATE 25 MCG: 50 INJECTION, SOLUTION INTRAMUSCULAR; INTRAVENOUS at 11:09

## 2021-03-13 RX ADMIN — CARVEDILOL 6.25 MG: 6.25 TABLET, FILM COATED ORAL at 17:10

## 2021-03-13 RX ADMIN — FENTANYL CITRATE 100 MCG: 50 INJECTION, SOLUTION INTRAMUSCULAR; INTRAVENOUS at 10:10

## 2021-03-13 RX ADMIN — IOHEXOL 194 ML: 350 INJECTION, SOLUTION INTRAVENOUS at 11:10

## 2021-03-13 RX ADMIN — LIDOCAINE HYDROCHLORIDE: 20 INJECTION, SOLUTION INFILTRATION; PERINEURAL at 10:10

## 2021-03-13 RX ADMIN — TICAGRELOR 90 MG: 90 TABLET ORAL at 07:07

## 2021-03-13 RX ADMIN — HEPARIN SODIUM 2000 UNITS: 1000 INJECTION, SOLUTION INTRAVENOUS; SUBCUTANEOUS at 07:13

## 2021-03-13 RX ADMIN — CEPHALEXIN 500 MG: 500 CAPSULE ORAL at 12:19

## 2021-03-13 RX ADMIN — TICAGRELOR 90 MG: 90 TABLET ORAL at 17:10

## 2021-03-13 RX ADMIN — VERAPAMIL HYDROCHLORIDE 2.5 MG: 2.5 INJECTION, SOLUTION INTRAVENOUS at 10:10

## 2021-03-13 RX ADMIN — ROSUVASTATIN CALCIUM 40 MG: 20 TABLET, FILM COATED ORAL at 17:10

## 2021-03-13 RX ADMIN — NITROGLYCERIN 10 ML: 20 INJECTION INTRAVENOUS at 10:10

## 2021-03-13 RX ADMIN — HEPARIN SODIUM 2000 UNITS: 200 INJECTION, SOLUTION INTRAVENOUS at 10:10

## 2021-03-13 ASSESSMENT — COPD QUESTIONNAIRES
DURING THE PAST 4 WEEKS HOW MUCH DID YOU FEEL SHORT OF BREATH: NONE/LITTLE OF THE TIME
HAVE YOU SMOKED AT LEAST 100 CIGARETTES IN YOUR ENTIRE LIFE: NO/DON'T KNOW
DO YOU EVER COUGH UP ANY MUCUS OR PHLEGM?: NO/ONLY WITH OCCASIONAL COLDS OR INFECTIONS
COPD SCREENING SCORE: 2

## 2021-03-13 ASSESSMENT — FIBROSIS 4 INDEX: FIB4 SCORE: 2.78

## 2021-03-13 ASSESSMENT — ENCOUNTER SYMPTOMS
FEVER: 0
VOMITING: 0
NAUSEA: 0
CHEST TIGHTNESS: 0
HEADACHES: 0
CHILLS: 0
COUGH: 0
PALPITATIONS: 0
SHORTNESS OF BREATH: 0
DIZZINESS: 0
WHEEZING: 0

## 2021-03-13 ASSESSMENT — PAIN DESCRIPTION - PAIN TYPE: TYPE: ACUTE PAIN

## 2021-03-13 NOTE — ED NOTES
Pt able to transfer to side of bed to use urinal, still no c/o chest pain.    Hospitalist then to bedside and updated pt and wife about plan of care.

## 2021-03-13 NOTE — DISCHARGE PLANNING
STEMI Response    Referral: Code STEMI Response    Intervention: SW responded to Stemi.  Pt was BIB [facility] from [facility].  Pt was alert upon arrival.  Pts name is Simon Orozco (: 1940).  SW obtained the following pt information: Pt reportedly drove himself to [facility] with complaints of chest pain.  Pt's Spouse Ramona arrived shortly after the Pt ([phone]).  SW escorted Ramona to Pt bedside once Pt was roomed in ER.     Plan: SW will continue to monitor and assist if needs arise.

## 2021-03-13 NOTE — CONSULTS
Consults   Cardiology Initial Consultation    Date of Service  3/12/2021    Referring Physician  Jg Chau, *    Reason for Consultation  Inferior ST elevation my    History of Presenting Illness  Simon Orozco is a 81 y.o. male who presented to [facility] on 3/12/2021 as a transfer from [facility] for sudden chest pain and inferior, possible posterior ST elevation MI.  Patient was exercising, complete exercise when he had sudden pains across the chest.  Presented to the emergency room where he had inferior ST elevation.  Chest pain resolved with nitroglycerin.  EKG started to improve.  Then chest pain returned with ST elevation.  Patient was given 300 Plavix load, 4000 of IV heparin and transferred via helicopter to [facility].  He was chest pain-free prior to leaving [facility], remained chest pain-free inflight and here in our emergency room.  ECG from outside hospital shows inferior ST elevation, early R wave progression, possible posterior MI.  ECG returning to baseline here, again patient chest pain-free.    Has had previous stroke x2.  No prior coronary disease.    Previous stroke treatment included Lipitor and Plavix which she reports intolerance to both.    Lives in [place] with his wife.  No significant shortness of breath, lower extremity edema, orthopnea, palpitations, lightheadedness or syncope.    Has been taking aspirin chronically was recently held for prostate procedure.  Recently diagnosed with prostate cancer, started on hormone therapy with plans for radiation.  No maría problems with hematuria.    Labs from outside hospital show stable hemoglobin and platelets.  Creatinine 1.64 with GFR in the 30s.    After stabilization in the emergency room, his systolic blood pressure was noted to be 175.  No return of chest pain.    Review of Systems  Review of Systems    All systems reviewed and negative.    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 He also has no past medical history of Anesthesia, Anginal syndrome (HCC), Arrhythmia, Asthma, At risk for sleep apnea, Blood clotting disorder (HCC), Breath shortness, Bronchitis, Cancer (HCC), Cataract, Cold, Congestive heart failure (HCC), Dental disorder, Emphysema of lung (HCC), Glaucoma, Heart burn, Heart murmur, Heart valve disease, Hemorrhagic disorder (HCC), Hepatitis A, Hepatitis B, Hepatitis C, Indigestion, Pacemaker, Pneumonia, Psychiatric problem, Rheumatic fever, Seizure (HCC), Sleep apnea, Snoring, or Urinary incontinence.    Surgical History   has a past surgical history that includes knee arthroscopy and appendectomy.    Family History  family history includes Cancer in his father and mother.      Social History   reports that he has never smoked. He has never used smokeless tobacco. He reports current alcohol use. He reports that he does not use drugs.    Medications  Prior to Admission Medications   Prescriptions Last Dose Informant Patient Reported? Taking?   Cholecalciferol (VITAMIN D) 50 MCG (2000 UT) Cap 3/12/2021 at AM Patient Yes Yes   Sig: Take 2,000 Units by mouth every day.   aspirin 81 MG tablet 3/3/2021 at [facility] Patient Yes No   Sig: Take 81 mg by mouth every day.   cefadroxil (DURICEF) 500 MG capsule 3/12/2021 at AM Patient Yes Yes   Sig: Take 500 mg by mouth every 12 hours. 3 day course   pravastatin (PRAVACHOL) 80 MG tablet NOT TAKING at NOT TAKING Patient No No   Sig: Take 1 Tab by mouth every bedtime.   Patient not taking: Reported on 3/12/2021   tamsulosin (FLOMAX) 0.4 MG capsule 3/12/2021 at AM Patient Yes Yes   Sig: Take 0.4 mg by mouth 2 Times a Day.       Facility-Administered Medications: None       Allergies  Allergies   Allergen Reactions   • Plavix [Clopidogrel] Unspecified     Muscle weakness and fatigue    • Lipitor [Atorvastatin Calcium]      Muscle problems   • Lisinopril      Muscle problems       Vital signs in last 24 hours  Temp:  [36.2 °C (97.2 °F)-37.1 °C (98.8 °F)] 36.4 °C (97.5 °F)  Pulse:  [56-67] 66  Resp:  [16-51] 18  BP: (144-177)/() 144/82  SpO2:  [92 %-97 %] 96 %    Physical Exam  Physical Exam      General: No acute distress. Well nourished.  Appears younger than stated age.  HEENT: EOM grossly intact, no scleral icterus, no pharyngeal erythema.   Neck:  No JVD, no bruits, trachea midline  CVS: RRR. Normal S1, S2. No M/R/G. No LE edema.  2+ radial pulses, 2+ PT pulses  Resp: CTAB. No wheezing or crackles/rhonchi. Normal respiratory effort.  Abdomen: Soft, NT, no maría hepatomegaly.  MSK/Ext: No clubbing or cyanosis.  Skin: Warm and dry, no rashes.  Neurological: CN III-XII grossly intact. No focal deficits.   Psych: A&O x 3, appropriate affect, good judgement      Lab Review  Lab Results   Component Value Date/Time    WBC 10.5 03/12/2021 03:47 PM    RBC 5.21 03/12/2021 03:47 PM    HEMOGLOBIN 14.5 03/12/2021 03:47 PM    HEMATOCRIT 45.0 03/12/2021 03:47 PM    MCV 86.4 03/12/2021 03:47 PM    MCH 27.8 03/12/2021 03:47 PM    MCHC 32.2 (L) 03/12/2021 03:47 PM    MPV 11.0 03/12/2021 03:47 PM      Lab Results   Component Value Date/Time    SODIUM 138 03/12/2021 03:47 PM    POTASSIUM 5.2 03/12/2021 03:47 PM    CHLORIDE 104 03/12/2021 03:47 PM    CO2 23 03/12/2021 03:47 PM    GLUCOSE 116 (H) 03/12/2021 03:47 PM    BUN 24 (H) 03/12/2021 03:47 PM    CREATININE 1.40 03/12/2021 03:47 PM      Lab Results   Component Value Date/Time    ASTSGOT 31 03/12/2021 03:47 PM    ALTSGPT 14 03/12/2021 03:47 PM     Lab Results   Component Value Date/Time    CHOLSTRLTOT 165 02/24/2016 03:11 AM    LDL 93 02/24/2016 03:11 AM    HDL 33 (A) 02/24/2016 03:11 AM     TRIGLYCERIDE 193 (H) 02/24/2016 03:11 AM    TROPONINT 73 (H) 03/12/2021 05:51 PM       No results for input(s): NTPROBNP in the last 72 hours.    Cardiac Imaging and Procedures Review  EKG:  My personal interpretation of the EKG dated 3/13/2021 is sinus, resolving inferior ST elevation and anterior depression, rate 67    ECG from [facility] inferior ST elevation, early R wave progression consistent with possible posterior MI    Echocardiogram: 2016  No prior study is available for comparison.   Left ventricular ejection fraction is visually estimated to be 60%.  No significant valve disease or flow abnormalities.   Unable to estimate pulmonary artery pressure due to an inadequate   tricuspid regurgitant jet.       Cardiac Catheterization: Pending    Imaging  DX-CHEST-LIMITED (1 VIEW)   Final Result      Mild enlargement of the cardiomediastinal silhouette without acute cardiopulmonary disease.      CL-LEFT HEART CATHETERIZATION WITH POSSIBLE INTERVENTION    (Results Pending)   EC-ECHOCARDIOGRAM COMPLETE W/O CONT    (Results Pending)         Assessment/Plan  -Inferior ST elevation MI, resolved, chest pain-free with resolution of ECG changes  -Coronary artery disease with ACS  -Prior CVA  -CKD 3B  -Recent diagnosis of prostate cancer  -Lipitor intolerance  -Plavix intolerance  -Urinary retention requiring self-catheterization  -Hypertension  -GERD  -Remote history of polio  -PFO by history  -Hyperglycemia    Plan:  -Continues chest pain-free with resolution of ECG changes, aggressive medical therapy including heparin, Brilinta load, beta-blocker and statin  -Avoid Effient given history of stroke  -We will hydrate his kidneys overnight, he will be taken for left heart catheterization admission  -Should he have any resolution of chest pain, would go emergently to heart cath  -He is a candidate for dual antiplatelet therapy  -He should be able to resume with his hormone therapy and radiation  -Crestor  -Echo  tomorrow  -Lipid panel  -As needed nitro and morphine  -Blood pressure control    Discussed with Dr. Freeman Quintanilla    Cardiology will continue to follow along.    Patient seen on 3/12/2021, documentation completed on 3/13/2021    Thank you for allowing me to participate in the care of this patient.    Please contact me with any questions.    Dania Thomason M.D.   Cardiologist, [facility]  [phone]

## 2021-03-13 NOTE — PROGRESS NOTES
Hospital Medicine Daily Progress Note    Date of Service  3/13/2021    Chief Complaint  81 y.o. male admitted 3/12/2021 as transfer from [place] for chest pain with STEMI    Hospital Course  81 y.o. male who presented 3/12/2021 with a previous medical history that includes BPH, dyslipidemia, patent foramen ovale, previous stroke, GERD, distant history of polio, stage III chronic kidney disease.     Patient presents today after being transferred from [place] where he presented with complaint of chest pain.  Patient reports chest pain that began last night.  Symptoms came on and off several times, seem to be worse with activity but the patient was uncertain.  On presentation to the transferring facility, he was found to have ST segment elevation consistent with a STEMI.  He was treated symptomatically with nitroglycerin, given aspirin, and an oral beta-blocker and then started on a heparin drip.  He was then transferred emergently down to us.  By the time he arrived here, his chest pain had resolved, and his ST segment elevations had come back into the normal range.  He has since been evaluated by cardiology here in the ED, and the plan is to take him to cardiac Cath Lab tomorrow morning as long as he remains chest pain-free overnight.     Of note the patient had a ureteral stent removed yesterday, for treatment of a kidney stone.    Interval Problem Update  3/13: planned for [facility], await finding. Continue medical management of CAD.    Consultants/Specialty  Cardiology    Code Status  Full Code    Disposition  TBD    Review of Systems  ROS   See HPI for details    Physical Exam  Temp:  [35.9 °C (96.7 °F)-37.1 °C (98.8 °F)] 36.2 °C (97.1 °F)  Pulse:  [56-67] 61  Resp:  [16-51] 16  BP: (137-177)/() 137/94  SpO2:  [92 %-97 %] 96 %    Physical Exam  Constitutional:       Appearance: Normal appearance.   HENT:      Head: Normocephalic and atraumatic.      Nose: Nose normal.      Mouth/Throat:       Mouth: Mucous membranes are dry.      Pharynx: Oropharynx is clear.   Eyes:      General: No scleral icterus.     Extraocular Movements: Extraocular movements intact.      Pupils: Pupils are equal, round, and reactive to light.   Cardiovascular:      Rate and Rhythm: Normal rate and regular rhythm.      Pulses: Normal pulses.      Heart sounds: No friction rub.   Pulmonary:      Effort: Pulmonary effort is normal.      Breath sounds: No wheezing or rales.   Abdominal:      Palpations: Abdomen is soft.      Tenderness: There is no abdominal tenderness. There is no guarding or rebound.   Musculoskeletal:         General: No swelling or tenderness. Normal range of motion.      Cervical back: Normal range of motion and neck supple. No tenderness.   Skin:     General: Skin is warm and dry.      Capillary Refill: Capillary refill takes 2 to 3 seconds.   Neurological:      General: No focal deficit present.      Mental Status: He is alert and oriented to person, place, and time.      Motor: No weakness.   Psychiatric:         Mood and Affect: Mood normal.         Fluids    Intake/Output Summary (Last 24 hours) at 3/13/2021 1237  Last data filed at 3/13/2021 1225  Gross per 24 hour   Intake --   Output 1525 ml   Net -1525 ml       Laboratory  Recent Labs     03/12/21  1529 03/12/21  1547 03/13/21  0358   WBC 10.5 10.5 7.0   RBC 5.10 5.21 5.04   HEMOGLOBIN 14.4 14.5 14.0   HEMATOCRIT 43.6 45.0 42.0   MCV 85.5 86.4 83.3   MCH 28.2 27.8 27.8   MCHC 33.0* 32.2* 33.3*   RDW 42.0 42.2 40.9   PLATELETCT 229 257 241   MPV 10.4 11.0 10.6     Recent Labs     03/12/21  1529 03/12/21  1547 03/13/21  0358   SODIUM 138 138 139   POTASSIUM 4.3 5.2 3.9   CHLORIDE 102 104 105   CO2 23 23 22   GLUCOSE 124* 116* 99   BUN 25* 24* 27*   CREATININE 1.42* 1.40 1.47*   CALCIUM 9.5 9.6 9.6     Recent Labs     03/12/21  1529 03/12/21  1547 03/12/21  1751   APTT >240.0* 51.1* 31.3   INR 1.16* 1.11 1.04         Recent Labs     03/13/21  0358    TRIGLYCERIDE 116   HDL 45   LDL 91       Imaging  DX-CHEST-LIMITED (1 VIEW)   Final Result      Mild enlargement of the cardiomediastinal silhouette without acute cardiopulmonary disease.      CL-LEFT HEART CATHETERIZATION WITH POSSIBLE INTERVENTION    (Results Pending)   EC-ECHOCARDIOGRAM COMPLETE W/O CONT    (Results Pending)        Assessment/Plan  LATASHA (acute kidney injury) (HCC)  Assessment & Plan  LATASHA on CKD III  Monitor is pt is to have contrast for cath    Acute ST elevation myocardial infarction (STEMI) of inferior wall (HCC)  Assessment & Plan  Patient is currently chest pain-free and his EKG has normalized.  hepartin gtt  ASA/Brilinta/BB/statin  PRN nitro SL, morphine  F/u echo  Cardiology f/u appreciated -- planned for [facility] 3/13    Hyperglycemia  Assessment & Plan  Patient is hyperglycemic this afternoon however he is under an acute metabolic stress, and is not fasting.  He does not have a history of type 2 diabetes   We will check an A1c and fasting glucose in the a.m.  Suspect stress response    Stage 3a chronic kidney disease  Assessment & Plan  Patient creatinine today is running around his baseline.  Follow urine output and daily BMP  Renally dose medications as appropriate  Cautious hydration overnight with anticipation of contrast load in the a.m.  He is also acutely s/p stent removal yesterday.  He was started on antibiotic for course of 48 hours as prophylaxis, we will continue through tomorrow.    GERD (gastroesophageal reflux disease)- (present on admission)  Assessment & Plan  Asymptomatic    Hypertension- (present on admission)  Assessment & Plan  The patient reports his systolic blood pressures run around 1 30-1 40.  He had been on losartan but it was stopped as it was felt not to be necessary.  Start low-dose beta-blockade  Anticipate addition of an ACE or ARB    Stroke (HCC)- (present on admission)  Assessment & Plan  Patient has distant history of stroke, and during that work-up was  found to have a PFO.  He had been on aspirin and Plavix as well as losartan however the Plavix and losartan were stopped after his pressure was noted to be around 130 systolically.  He has continued on aspirin and on a statin.    Patent foramen ovale- (present on admission)  Assessment & Plan  Repeat cardiac echo       VTE prophylaxis: heparin gtt

## 2021-03-13 NOTE — ASSESSMENT & PLAN NOTE
Known CAD  Heparin gtt  ASA/Brilinta/BB/statin  PRN nitro SL, morphine  F/u echo  Cardiology f/u appreciated -- planned for [facility] 3/13

## 2021-03-13 NOTE — PROCEDURES
DATE OF PROCEDURE:  03/13/2021     REFERRING PHYSICIAN:  Dania Thomason MD     PROCEDURES:  1.  Left heart catheterization.  2.  Coronary angiography.  3.  PTCA/stent placement of proximal to mid second obtuse marginal branch.  4.  Left ventriculogram.  5.  Monitor conscious sedation.     PREPROCEDURE DIAGNOSIS:  STEMI.     POSTPROCEDURE DIAGNOSES:  1.  Two-vessel coronary artery disease with high-grade proximal to mid obtuse   marginal branch stenosis and high-grade mid right coronary artery stenosis.  2.  Successful PTCA/stent placement of the proximal to mid obtuse marginal   branch.  3.  Normal left ventricular systolic function with ejection fraction of 65%.  4.  Elevated left ventricular end-diastolic pressure.     INDICATIONS:  The patient is an 81-year-old male who was transferred from   [facility] for chest pain.  He was diagnosed with a STEMI.  He was   scheduled for cardiac catheterization.     DESCRIPTION OF PROCEDURE:  After informed consent was signed by the     patient, the patient was brought to the cardiac catheterization laboratory.  He   was prepped and draped in the usual sterile manner.  The right wrist area was   anesthetized with 2% Xylocaine.  A 6-Czech sheath was inserted into the   right radial artery using the modified Seldinger technique.  Intra-arterial   verapamil and nitroglycerin were given.  IV heparin was given.  A 4-Czech   pigtail catheter was positioned into the left ventricle.  Left angiography was   performed.  This was exchanged for 4-Czech JL3.5 catheter, which was   positioned into the left main coronary artery.  Coronary angiography was   performed.  This catheter was exchanged for 4-Czech JR4 catheter, which   was positioned into the right coronary artery.  Coronary angiography was   performed.  This catheter was exchanged for EBU 3.5 guide catheter, which   was positioned into the left main coronary artery.  IV Angiomax was started.    A 300 wire was  positioned across the identified stenosis of the obtuse marginal   branch.  The stenosis was predilated with 2.5 x 20 mm and 2.5 x 20 mm NC   Euphora balloons.  A 2.5 x 20 mm Tramaine drug-eluting stent was successfully   positioned and deployed.  The patient tolerated the procedure well.  At the   end of procedure, all wires, balloons, guide, and sheaths were removed.    A Hemoband was placed in the right wrist.  He was already preloaded with   Brilinta.  Angiomax was stopped.  He was transferred back to CICU in stable   condition.     HEMODYNAMIC DATA:  Hemodynamic data shows aortic pressures of 140/90 mmHg  with mean of 110 mmHg and /0 with LVEDP of 20 mmHg.     AORTIC VALVE:  There is no significant gradient noted.     LEFT VENTRICULOGRAM:  10 mL of contrast were delivered for 2 seconds.    Ejection fraction was estimated to be 65%.  There were no segmental wall   motion abnormalities noted.     ANGIOGRAM:  LEFT MAIN CORONARY ARTERY:  Left main coronary artery is a moderate length,   moderate-caliber vessel without significant stenosis.     LEFT ANTERIOR DESCENDING ARTERY:  Left anterior descending artery is a long   moderate caliber vessel with mid calcified diffuse luminal irregularities of   20%-30%.  There is a moderate caliber first diagonal branch noted free of   disease.  There is a second moderate caliber diagonal branch with diffuse   luminal irregularities of 40%-50% stenosis.     LEFT CIRCUMFLEX ARTERY:  Left circumflex artery is a nondominant large caliber   vessel with large first obtuse marginal branch and noted free of disease.    There is a second large obtuse marginal branch with proximal concentric 99%   stenosis.     RIGHT CORONARY ARTERY:  Right coronary artery is a dominant moderate caliber   vessel with diffuse luminal irregularities and possible thrombus in the mid   portion.  Distally, there are small posterior descending artery branch noted   free of disease.     IMPRESSION:  1.   Two-vessel coronary artery disease with high-grade proximal to mid obtuse   marginal branch stenosis and high-grade mid right coronary artery stenosis.  2.  Successful PTCA/stent placement of the proximal to mid obtuse marginal   branch.  3.  Normal left ventricular systolic function with ejection fraction of 65%.  4.  Elevated left ventricular end diastolic pressure.     RECOMMENDATIONS:  I recommend to continue with Brilinta and perform   percutaneous intervention of the right coronary artery in a.m.    SEDATION TIME: The patient's sedation was managed by myself with continuous   face to face time with the patient for 15 minutes from 10:00 to 10:15.     ______________________________  MD RADHA PAULINO/RAHUL/HUGO    DD:  03/13/2021 11:25  DT:  03/13/2021 12:50    Job#:  [id number]

## 2021-03-13 NOTE — ED NOTES
from lab called with a critical result of elevated antiXa and PTT at 1715.  Critical lab read back to .  Dr. Shea notified of critical lab result at 1715.  Critical lab result read back by .

## 2021-03-13 NOTE — PROGRESS NOTES
Cardiology Follow Up Progress Note    Date of Service  3/13/2021    Attending Physician  Armand Tee M.D.    Chief Complaint   Inferior STEMI    HPI  Simon Orozco is a 81 y.o. male admitted 3/12/2021 as a transfer from [facility] for sudden chest pain and inferior, possible posterior ST elevation MI.  Patient was exercising, complete exercise when he had sudden pains across the chest.  Presented to the emergency room where he had inferior ST elevation.  Chest pain resolved with nitroglycerin.  EKG started to improve.  Then chest pain returned with ST elevation.  Patient was given 300 Plavix load, 4000 of IV heparin and transferred via helicopter to [facility].  He was chest pain-free prior to leaving [facility], remained chest pain-free inflight and here in our emergency room.  ECG from outside hospital shows inferior ST elevation, early R wave progression, possible posterior MI.  ECG returning to baseline here, again patient chest pain-free.     Has had previous stroke x2.  No prior coronary disease.     Previous stroke treatment included Lipitor and Plavix which he reports intolerance to both.     Lives in [place] with his wife.  No significant shortness of breath, lower extremity edema, orthopnea, palpitations, lightheadedness or syncope.     Has been taking aspirin chronically was recently held for prostate procedure.  Recently diagnosed with prostate cancer, started on hormone therapy with plans for radiation.  No maría problems with hematuria.     Labs from outside hospital show stable hemoglobin and platelets.  Creatinine 1.64 with GFR in the 30s.     After stabilization in the emergency room, his systolic blood pressure was noted to be 175.  No return of chest pain.    Interim Events  3/13/2021: SR 60s fPVCs  Resting comfortably, right radial cath site with HemoBand  Na 139, K 3.9, Cr 1.47, BUN 27, GFR 46    Review of Systems  Review of Systems   Constitutional: Negative for chills and  fever.   Respiratory: Negative for cough, chest tightness, shortness of breath and wheezing.    Cardiovascular: Negative for chest pain, palpitations and leg swelling.   Gastrointestinal: Negative for nausea and vomiting.   Neurological: Negative for dizziness and headaches.   All other systems reviewed and are negative.      Vital signs in last 24 hours  Temp:  [35.9 °C (96.7 °F)-37.1 °C (98.8 °F)] 36.2 °C (97.1 °F)  Pulse:  [56-67] 61  Resp:  [16-51] 16  BP: (137-177)/() 137/94  SpO2:  [92 %-97 %] 96 %    Physical Exam  Physical Exam  Vitals and nursing note reviewed.   Constitutional:       Appearance: Normal appearance.   HENT:      Head: Normocephalic and atraumatic.      Mouth/Throat:      Mouth: Mucous membranes are moist.   Eyes:      Extraocular Movements: Extraocular movements intact.   Neck:      Comments: No JVD  Cardiovascular:      Rate and Rhythm: Normal rate and regular rhythm.      Pulses: Normal pulses.           Radial pulses are 2+ on the right side and 2+ on the left side.      Heart sounds: Normal heart sounds. No murmur.      Comments: Right radial cath site with HemoBand  Pulmonary:      Effort: Pulmonary effort is normal.      Breath sounds: Normal breath sounds.   Abdominal:      Palpations: Abdomen is soft.   Musculoskeletal:      Cervical back: Normal range of motion and neck supple.   Skin:     General: Skin is warm and dry.   Neurological:      General: No focal deficit present.      Mental Status: He is alert and oriented to person, place, and time.   Psychiatric:         Mood and Affect: Mood normal.         Behavior: Behavior normal.         Lab Review  Lab Results   Component Value Date/Time    WBC 7.0 03/13/2021 03:58 AM    RBC 5.04 03/13/2021 03:58 AM    HEMOGLOBIN 14.0 03/13/2021 03:58 AM    HEMATOCRIT 42.0 03/13/2021 03:58 AM    MCV 83.3 03/13/2021 03:58 AM    MCH 27.8 03/13/2021 03:58 AM    MCHC 33.3 (L) 03/13/2021 03:58 AM    MPV 10.6 03/13/2021 03:58 AM      Lab Results    Component Value Date/Time    SODIUM 139 03/13/2021 03:58 AM    POTASSIUM 3.9 03/13/2021 03:58 AM    CHLORIDE 105 03/13/2021 03:58 AM    CO2 22 03/13/2021 03:58 AM    GLUCOSE 99 03/13/2021 03:58 AM    BUN 27 (H) 03/13/2021 03:58 AM    CREATININE 1.47 (H) 03/13/2021 03:58 AM      Lab Results   Component Value Date/Time    ASTSGOT 31 03/12/2021 03:47 PM    ALTSGPT 14 03/12/2021 03:47 PM     Lab Results   Component Value Date/Time    CHOLSTRLTOT 159 03/13/2021 03:58 AM    LDL 91 03/13/2021 03:58 AM    HDL 45 03/13/2021 03:58 AM    TRIGLYCERIDE 116 03/13/2021 03:58 AM    TROPONINT 280 (H) 03/13/2021 05:58 AM       No results for input(s): NTPROBNP in the last 72 hours.    Cardiac Imaging and Procedures Review  EKG: 3/13/2021  SINUS RHYTHM   PVCs   LEFT ANTERIOR FASCICULAR BLOCK   Nonspecific T wave changes   Electronically Signed On 3- 6:54:36 PST by Dania Thomason     Echocardiogram:  3/13/2021  Pending    Cardiac Catheterization: 3/13/2021  POSTPROCEDURE DIAGNOSES:  1.  Two-vessel coronary artery disease with high-grade proximal to mid obtuse   marginal branch stenosis and high-grade mid right coronary artery stenosis.  2.  Successful PTCA/stent placement of the proximal to mid obtuse marginal   Branch with 2.5 x 20 mm [place] AGATHA  3.  Normal left ventricular systolic function with ejection fraction of 65%.  4.  Elevated left ventricular end-diastolic pressure.    Imaging  Chest X-Ray: 3/12/2021  Mild enlargement of the cardiomediastinal silhouette without acute cardiopulmonary disease.      Assessment/Plan  1. Inferior STEMI  -Cardiac catheterization   -Continue ASA 81 mg daily, carvedilol 6.25 mg daily, rosuvastatin 40 mg daily, and Brilinta 90 mg twice daily  -Brilinta sent to [facility] in [place].  Social work request to check cost and submit prior authorization as the patient is allergic to Plavix and cannot take prasugrel  -Intolerance to Lipitor and Plavix  -Chest pain-free    2.  CKD, stage IIIb  -Per  primary    3.  Recent diagnosis of prostate cancer  -Requires self-catheterization        Thank you for allowing me to participate in the care of this patient.  I will continue to follow this patient    Please contact me with any questions.        JASON Gan  [facility]  [phone]        JASON Gan  [facility]  [phone]    Future Appointments   Date Time Provider Department Center   3/31/2021 10:45 AM SARAH Danielson.[facility] None       Please note that this dictation was created using voice recognition software. I have worked with consultants from the vendor as well as technical experts from [employer] [facility] to optimize the interface. I have made every reasonable attempt to correct obvious errors, but I expect that there are errors of grammar and possibly content I did not discover before finalizing the note.      Please note that this dictation was created using voice recognition software. I have worked with consultants from the vendor as well as technical experts from [employer] to optimize the interface. I have made every reasonable attempt to correct obvious errors, but I expect that there are errors of grammar and possibly content I did not discover before finalizing the note.

## 2021-03-13 NOTE — PROGRESS NOTES
Total of 6ml taken out of TR band. Bleeding noted. 6ml placed back into TR band. Will check in 30 minutes.

## 2021-03-13 NOTE — PROGRESS NOTES
Spoke with Lab several times in the night to obtain a troponin.  First time was at 2103 the phlebotomist erica the first Xa and was asked to draw a troponin and he did.  I marked it as sent on my end.  The next time was at 0500 with the second Xa.  Notified lab that I would need another troponin read with am labs.  Placed and order for a stat troponin.  No results yet.  Notified the primary RN for the day for follow up.

## 2021-03-13 NOTE — ED NOTES
Med rec completed per pt   Allergies reviewed    Pt started a 3 day course of Cefadroxil yesterday morning (3/11/2021)    Pt states that he has been holding his Asprin 81 mg daily since 3/3/2021 due to a procedure

## 2021-03-13 NOTE — CARE PLAN
Problem: Fluid Volume:  Goal: Will maintain balanced intake and output  Outcome: PROGRESSING AS EXPECTED

## 2021-03-13 NOTE — PROGRESS NOTES
Monitor summary: NSR: 64-84  .16/.08/.36    Per strip from monitor room      Repeat lipid panel ordered and scheduled.

## 2021-03-13 NOTE — PROGRESS NOTES
Patient back from cath lab. Patient is A&Ox4, no complaints of pain, tele monitor placed and verified by monitor room. Post-op vitals in place, TR band in place with 14 ml. No signs of bleeding or hematoma. All questions and concerns answered, bed in lowest and locked position, call light in reach, will continue to monitor.

## 2021-03-13 NOTE — H&P
Hospital Medicine History & Physical Note    Date of Service  3/12/2021    Primary Care Physician  Pcp Not In Computer    Consultants  Cardiology    Code Status  Full Code    Chief Complaint  Chief Complaint   Patient presents with   • Chest Pain     Pt started having chest pain after he was working out. Pt drove to [facility] where they did a chest pain workup discovering a inferior STEMI. Pt was then transferred here for IR. Pt is currently chest pain free. Pt recieved metoprolol, aspirin, nitro x3 and heparin. Cardiology and ERP at bedside upon arrival.        History of Presenting Illness  81 y.o. male who presented 3/12/2021 with a previous medical history that includes BPH, dyslipidemia, patent foramen ovale, previous stroke, GERD, distant history of polio, stage III chronic kidney disease.    Patient presents today after being transferred from [place] where he presented with complaint of chest pain.  Patient reports chest pain that began last night.  Symptoms came on and off several times, seem to be worse with activity but the patient was uncertain.  On presentation to the transferring facility, he was found to have ST segment elevation consistent with a STEMI.  He was treated symptomatically with nitroglycerin, given aspirin, and an oral beta-blocker and then started on a heparin drip.  He was then transferred emergently down to us.  By the time he arrived here, his chest pain had resolved, and his ST segment elevations had come back into the normal range.  He has since been evaluated by cardiology here in the ED, and the plan is to take him to cardiac Cath Lab tomorrow morning as long as he remains chest pain-free overnight.    Of note the patient had a ureteral stent removed yesterday, for treatment of a kidney stone.    Review of Systems  Review of Systems   Constitutional: Negative for chills and fever.   HENT: Negative for nosebleeds and sore throat.    Eyes: Negative for blurred vision  and double vision.   Respiratory: Negative for cough and shortness of breath.    Cardiovascular: Positive for chest pain. Negative for palpitations and leg swelling.   Gastrointestinal: Negative for abdominal pain, diarrhea, nausea and vomiting.   Genitourinary: Negative for dysuria and urgency.   Musculoskeletal: Negative for back pain.   Skin: Negative for rash.   Neurological: Negative for dizziness, loss of consciousness and headaches.       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    Surgical History   has a past surgical history that includes knee arthroscopy and appendectomy.   Ureteral stent placement and removal    Family History  family history includes Cancer in his father and mother.     Social History   reports that he has never smoked. He has never used smokeless tobacco. He reports current alcohol use. He reports that he does not use drugs.    Allergies  Allergies   Allergen Reactions   • Plavix [Clopidogrel] Unspecified     Muscle weakness and fatigue    • Lipitor [Atorvastatin Calcium]      Muscle problems   • Lisinopril      Muscle problems       Medications  Prior to Admission Medications   Prescriptions Last Dose Informant Patient Reported? Taking?   Cholecalciferol (VITAMIN D) 50 MCG (2000 UT) Cap 3/12/2021 at AM Patient Yes Yes   Sig: Take 2,000 Units by mouth every day.   aspirin 81 MG tablet 3/3/2021 at UNK Patient Yes No   Sig: Take 81 mg by mouth every day.   cefadroxil (DURICEF) 500 MG capsule 3/12/2021 at AM Patient Yes Yes   Sig: Take 500 mg by mouth every 12 hours. 3 day course   pravastatin (PRAVACHOL) 80 MG tablet NOT TAKING at NOT TAKING Patient No No   Sig: Take 1 Tab by mouth every bedtime.   Patient not taking: Reported on  3/12/2021   tamsulosin (FLOMAX) 0.4 MG capsule 3/12/2021 at AM Patient Yes Yes   Sig: Take 0.4 mg by mouth 2 Times a Day.      Facility-Administered Medications: None       Physical Exam  Temp:  [37.1 °C (98.8 °F)] 37.1 °C (98.8 °F)  Pulse:  [56-67] 56  Resp:  [16-51] 18  BP: (155-177)/(87-94) 159/87  SpO2:  [93 %-97 %] 96 %    Physical Exam  Vitals reviewed.   Constitutional:       General: He is not in acute distress.     Appearance: Normal appearance. He is well-developed. He is not diaphoretic.   HENT:      Head: Normocephalic and atraumatic.   Eyes:      Conjunctiva/sclera: Conjunctivae normal.   Neck:      Vascular: No JVD.   Cardiovascular:      Rate and Rhythm: Normal rate.      Heart sounds: No murmur. No gallop.    Pulmonary:      Effort: Pulmonary effort is normal. No respiratory distress.      Breath sounds: No stridor. No wheezing or rales.   Abdominal:      Palpations: Abdomen is soft.      Tenderness: There is no abdominal tenderness. There is no guarding or rebound.   Musculoskeletal:         General: No tenderness.      Right lower leg: No edema.      Left lower leg: No edema.   Skin:     General: Skin is warm and dry.      Findings: No rash.   Neurological:      General: No focal deficit present.      Mental Status: He is alert and oriented to person, place, and time.   Psychiatric:         Mood and Affect: Mood normal.         Thought Content: Thought content normal.         Laboratory:  Recent Labs     03/12/21  1529 03/12/21  1547   WBC 10.5 10.5   RBC 5.10 5.21   HEMOGLOBIN 14.4 14.5   HEMATOCRIT 43.6 45.0   MCV 85.5 86.4   MCH 28.2 27.8   MCHC 33.0* 32.2*   RDW 42.0 42.2   PLATELETCT 229 257   MPV 10.4 11.0     Recent Labs     03/12/21  1529   SODIUM 138   POTASSIUM 4.3   CHLORIDE 102   CO2 23   GLUCOSE 124*   BUN 25*   CREATININE 1.42*   CALCIUM 9.5     Recent Labs     03/12/21  1529   ALTSGPT 15   ASTSGOT 20   ALKPHOSPHAT 58   TBILIRUBIN 0.3   GLUCOSE 124*     Recent Labs     03/12/21  1529    APTT >240.0*   INR 1.16*     No results for input(s): NTPROBNP in the last 72 hours.      Recent Labs     03/12/21  1529   TROPONINT 29*       Imaging:  DX-CHEST-LIMITED (1 VIEW)   Final Result      Mild enlargement of the cardiomediastinal silhouette without acute cardiopulmonary disease.      CL-LEFT HEART CATHETERIZATION WITH POSSIBLE INTERVENTION    (Results Pending)   EC-ECHOCARDIOGRAM COMPLETE W/O CONT    (Results Pending)         Assessment/Plan:  I anticipate this patient will require at least two midnights for appropriate medical management, necessitating inpatient admission.    Acute ST elevation myocardial infarction (STEMI) of inferior wall (HCC)  Assessment & Plan  Patient is currently chest pain-free and his EKG has normalized.  Cardiology has been consulted  Admit to telemetry  Continue heparin drip  Continue aspirin and high-dose statin  Start low-dose beta-blockade  Anticipate cardiac cath in the a.m.  Check lipid panel, TSH and A1c  Cardiac echo  I have instructed the patient clearly tonight that should he have any return of his previous symptoms he is to tell us immediately.    Hyperglycemia  Assessment & Plan  Patient is hyperglycemic this afternoon however he is under an acute metabolic stress, and is not fasting.  He does not have a history of type 2 diabetes   We will check an A1c and fasting glucose in the a.m.  Suspect stress response    Stage 3a chronic kidney disease  Assessment & Plan  Patient creatinine today is running around his baseline.  Follow urine output and daily BMP  Renally dose medications as appropriate  Cautious hydration overnight with anticipation of contrast load in the a.m.  He is also acutely s/p stent removal yesterday.  He was started on antibiotic for course of 48 hours as prophylaxis, we will continue through tomorrow.    GERD (gastroesophageal reflux disease)- (present on admission)  Assessment & Plan  Asymptomatic    Hypertension- (present on  admission)  Assessment & Plan  The patient reports his systolic blood pressures run around 1 30-1 40.  He had been on losartan but it was stopped as it was felt not to be necessary.  Start low-dose beta-blockade  Anticipate addition of an ACE or ARB    Stroke (HCC)- (present on admission)  Assessment & Plan  Patient has distant history of stroke, and during that work-up was found to have a PFO.  He had been on aspirin and Plavix as well as losartan however the Plavix and losartan were stopped after his pressure was noted to be around 130 systolically.  He has continued on aspirin and on a statin.    Patent foramen ovale- (present on admission)  Assessment & Plan  Repeat cardiac echo

## 2021-03-13 NOTE — ASSESSMENT & PLAN NOTE
On Coreg for CAD -- titrate up as tolerated  Avoid nephrotoxic agent at this time  Optimize meds as tolerated and as permitted by renal function

## 2021-03-13 NOTE — ED PROVIDER NOTES
ED Provider Note    CHIEF COMPLAINT  Chief Complaint   Patient presents with   • Chest Pain     Pt started having chest pain after he was working out. Pt drove to [facility] where they did a chest pain workup discovering a inferior STEMI. Pt was then transferred here for IR. Pt is currently chest pain free. Pt recieved metoprolol, aspirin, nitro x3 and heparin. Cardiology and ERP at bedside upon arrival.        HPI  Simon Orozco is a 81 y.o. male who presents for evaluation of chest pain.  The patient was transferred here from [facility].  He has prior history of stroke and coronary artery disease but no known history of myocardial infarction.  He presented with some stuttering chest pain to the above-mentioned hospital.  His initial EKG demonstrated inferior ST segment elevation consistent with STEMI.  He was given aspirin nitroglycerin oral beta-blocker and nitroglycerin and chest pain resolved.  His EKG changes also resolved as well.  He was transferred here for higher level of care.  On arrival he denies any chest pain.  He never had any strokelike symptoms such as numbness weakness tingling to the arms legs or face.  Pain did not radiate to his back.    REVIEW OF SYSTEMS  See HPI for further details.  No fevers chills night sweats weight loss all other systems are negative.     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   • Patent foramen ovale 3/20/2012   • Stroke 3/20/2012   • Acid reflux    • Arthritis    • Lipoma    • Polio remote       FAMILY HISTORY  Noncontributory    SOCIAL HISTORY  Social History     Socioeconomic History   • Marital status:      Spouse name: Not on file   • Number of children: Not on file   • Years of education: Not on file   • Highest education level: Not on file   Occupational  "History   • Occupation: Retired   Tobacco Use   • Smoking status: Never Smoker   • Smokeless tobacco: Never Used   Substance and Sexual Activity   • Alcohol use: Yes     Comment: occas   • Drug use: No   • Sexual activity: Not on file   Other Topics Concern   • Not on file   Social History Narrative   • Not on file     Social Determinants of Health     Financial Resource Strain:    • Difficulty of Paying Living Expenses:    Food Insecurity:    • Worried About Running Out of Food in the Last Year:    • Ran Out of Food in the Last Year:    Transportation Needs:    • Lack of Transportation (Medical):    • Lack of Transportation (Non-Medical):    Physical Activity:    • Days of Exercise per Week:    • Minutes of Exercise per Session:    Stress:    • Feeling of Stress :    Social Connections:    • Frequency of Communication with Friends and Family:    • Frequency of Social Gatherings with Friends and Family:    • Attends Anabaptist Services:    • Active Member of Clubs or Organizations:    • Attends Club or Organization Meetings:    • Marital Status:    Intimate Partner Violence:    • Fear of Current or Ex-Partner:    • Emotionally Abused:    • Physically Abused:    • Sexually Abused:        SURGICAL HISTORY  Past Surgical History:   Procedure Laterality Date   • APPENDECTOMY     • KNEE ARTHROSCOPY         CURRENT MEDICATIONS  Home Medications     Reviewed by Keeley Eldridge R.N. (Registered Nurse) on 03/12/21 at 1542  Med List Status: Not Addressed   Medication Last Dose Status   aspirin 81 MG tablet  Active   pravastatin (PRAVACHOL) 80 MG tablet  Active                ALLERGIES  Allergies   Allergen Reactions   • Lipitor [Atorvastatin Calcium]      Muscle problems   • Lisinopril      Muscle problems       PHYSICAL EXAM  VITAL SIGNS: BP (!) 177/88   Pulse 64   Temp 37.1 °C (98.8 °F) (Temporal)   Resp (!) 24   Ht 1.74 m (5' 8.5\")   Wt 85.3 kg (188 lb)   SpO2 95%   BMI 28.17 kg/m²       Constitutional: Well " developed, Well nourished, No acute distress, Non-toxic appearance.   HENT: Normocephalic, Atraumatic, Bilateral external ears normal, Oropharynx moist, No oral exudates, Nose normal.   Eyes: PERRLA, EOMI, Conjunctiva normal, No discharge.   Neck: Normal range of motion, No tenderness, Supple, No stridor.   Cardiovascular: Normal heart rate, Normal rhythm, No murmurs, No rubs, No gallops.   Thorax & Lungs: Normal breath sounds, No respiratory distress, No wheezing, No chest tenderness.   Abdomen: Bowel sounds normal, Soft, No tenderness, No masses, No pulsatile masses.   Skin: Warm, Dry, No erythema, No rash.   Back: No tenderness, No CVA tenderness.   Extremities: Intact distal pulses, No edema, No tenderness, No cyanosis, No clubbing.   Neurologic: Alert & oriented x 3, Normal motor function, Normal sensory function, No focal deficits noted.   Psychiatric: Affect normal, Judgment normal, Mood normal.     Results for orders placed or performed during the hospital encounter of 03/12/21   CBC WITH DIFFERENTIAL   Result Value Ref Range    WBC 10.5 4.8 - 10.8 K/uL    RBC 5.10 4.70 - 6.10 M/uL    Hemoglobin 14.4 14.0 - 18.0 g/dL    Hematocrit 43.6 42.0 - 52.0 %    MCV 85.5 81.4 - 97.8 fL    MCH 28.2 27.0 - 33.0 pg    MCHC 33.0 (L) 33.7 - 35.3 g/dL    RDW 42.0 35.9 - 50.0 fL    Platelet Count 229 164 - 446 K/uL    MPV 10.4 9.0 - 12.9 fL    Neutrophils-Polys 84.10 (H) 44.00 - 72.00 %    Lymphocytes 10.60 (L) 22.00 - 41.00 %    Monocytes 3.30 0.00 - 13.40 %    Eosinophils 0.90 0.00 - 6.90 %    Basophils 0.70 0.00 - 1.80 %    Immature Granulocytes 0.40 0.00 - 0.90 %    Nucleated RBC 0.00 /100 WBC    Neutrophils (Absolute) 8.85 (H) 1.82 - 7.42 K/uL    Lymphs (Absolute) 1.12 1.00 - 4.80 K/uL    Monos (Absolute) 0.35 0.00 - 0.85 K/uL    Eos (Absolute) 0.09 0.00 - 0.51 K/uL    Baso (Absolute) 0.07 0.00 - 0.12 K/uL    Immature Granulocytes (abs) 0.04 0.00 - 0.11 K/uL    NRBC (Absolute) 0.00 K/uL   Comp Metabolic Panel   Result  Value Ref Range    Sodium 138 135 - 145 mmol/L    Potassium 4.3 3.6 - 5.5 mmol/L    Chloride 102 96 - 112 mmol/L    Co2 23 20 - 33 mmol/L    Anion Gap 13.0 7.0 - 16.0    Glucose 124 (H) 65 - 99 mg/dL    Bun 25 (H) 8 - 22 mg/dL    Creatinine 1.42 (H) 0.50 - 1.40 mg/dL    Calcium 9.5 8.5 - 10.5 mg/dL    AST(SGOT) 20 12 - 45 U/L    ALT(SGPT) 15 2 - 50 U/L    Alkaline Phosphatase 58 30 - 99 U/L    Total Bilirubin 0.3 0.1 - 1.5 mg/dL    Albumin 4.1 3.2 - 4.9 g/dL    Total Protein 7.2 6.0 - 8.2 g/dL    Globulin 3.1 1.9 - 3.5 g/dL    A-G Ratio 1.3 g/dL   TROPONIN   Result Value Ref Range    Troponin T 29 (H) 6 - 19 ng/L   ESTIMATED GFR   Result Value Ref Range    GFR If  58 (A) >60 mL/min/1.73 m 2    GFR If Non  48 (A) >60 mL/min/1.73 m 2   SARS-COV Antigen BERNABE: Collect dry nasal swab AND NP swab in VTM   Result Value Ref Range    SARS-CoV-2 Source Nasal Swab    SARS-CoV-2 PCR (24 hour In-House): Collect NP swab in VTM    Specimen: Respirate   Result Value Ref Range    SARS-CoV-2 Source NP Swab    EKG (NOW)   Result Value Ref Range    Report       [facility].    Test Date:  2021  Pt Name:    JEANNE RODRÍGUEZ                   Department: ER  MRN:        [id number]                      Room:       RD 08  Gender:     Male                         Technician: ZEYNEP  :        [id number]                   Requested By:ER TRIAGE PROTOCOL  Order #:    [id number]                    Reading MD:    Measurements  Intervals                                Axis  Rate:       67                           P:          -6  AK:         157                          QRS:        -46  QRSD:       96                           T:          54  QT:         416  QTc:        439    Interpretive Statements  Sinus rhythm  Left anterior fascicular block  ST elevation, consider inferior injury  Compared to ECG 2016 10:52:52  ST (T wave) deviation now present  Myocardial infarct finding  now present        DX-CHEST-LIMITED (1 VIEW)   Final Result      Mild enlargement of the cardiomediastinal silhouette without acute cardiopulmonary disease.              COURSE & MEDICAL DECISION MAKING  The patient was a emergent transfer for inferior STEMI.  On arrival the ST segment elevation in the inferior leads II, III and aVF have normalized and he is chest pain-free.  Dr. Thomason with cardiology and I both evaluated the patient.  I agree with the plan of care to admit to the hospital hydrate and medically manage him overnight in anticipation of going to the Cath Lab tomorrow.  Troponin is slightly elevated.  While he was in the emergency department the patient did not develop any new ST changes or recurrence of chest pain.  He already received aspirin beta-blocker nitroglycerin and Plavix.  The patient will be admitted to telemetry with cardiology consultation and heart catheterization in the morning    FINAL IMPRESSION  1.  Unstable angina         Electronically signed by: Freeman Quintanilla M.D., 3/12/2021 4:39 PM

## 2021-03-13 NOTE — PROGRESS NOTES
Notified by lab of critical troponin of 280. Critical lab result read back to lab. Cardiology notified.

## 2021-03-13 NOTE — ED NOTES
Report to Jonathon TROY.  PT transported to tele unit on heparin gtt and with monitor.  All belongings sent with pt.

## 2021-03-13 NOTE — ED NOTES
Heparin gtt started.  PT denies CP.  Updated pt and his wife about plan of care.      PT resting comfortably at this time.

## 2021-03-13 NOTE — PROGRESS NOTES
Received report from NOC shift RN. Patient is A&Ox4, no complaints of pain, VSS, no signs of distress. Patient has been NPO since midnight, all questions and concerns answered, bed in lowest and locked position, call light in reach, will continue to monitor.

## 2021-03-14 ENCOUNTER — APPOINTMENT (OUTPATIENT)
Dept: CARDIOLOGY | Facility: MEDICAL CENTER | Age: 81
DRG: 247 | End: 2021-03-14
Attending: NURSE PRACTITIONER
Payer: MEDICARE

## 2021-03-14 PROBLEM — N14.11 CONTRAST DYE INDUCED NEPHROPATHY: Status: ACTIVE | Noted: 2021-03-14

## 2021-03-14 PROBLEM — T50.8X5A CONTRAST DYE INDUCED NEPHROPATHY: Status: ACTIVE | Noted: 2021-03-14

## 2021-03-14 LAB
ACT BLD: 274 SEC (ref 74–137)
ALBUMIN SERPL BCP-MCNC: 3.9 G/DL (ref 3.2–4.9)
ANION GAP SERPL CALC-SCNC: 13 MMOL/L (ref 7–16)
BASOPHILS # BLD AUTO: 0.9 % (ref 0–1.8)
BASOPHILS # BLD: 0.07 K/UL (ref 0–0.12)
BUN SERPL-MCNC: 25 MG/DL (ref 8–22)
CALCIUM SERPL-MCNC: 9.7 MG/DL (ref 8.5–10.5)
CHLORIDE SERPL-SCNC: 99 MMOL/L (ref 96–112)
CO2 SERPL-SCNC: 21 MMOL/L (ref 20–33)
CREAT SERPL-MCNC: 1.61 MG/DL (ref 0.5–1.4)
EKG IMPRESSION: NORMAL
EOSINOPHIL # BLD AUTO: 0.34 K/UL (ref 0–0.51)
EOSINOPHIL NFR BLD: 4.3 % (ref 0–6.9)
ERYTHROCYTE [DISTWIDTH] IN BLOOD BY AUTOMATED COUNT: 42.3 FL (ref 35.9–50)
GLUCOSE SERPL-MCNC: 116 MG/DL (ref 65–99)
HCT VFR BLD AUTO: 43.2 % (ref 42–52)
HGB BLD-MCNC: 14.3 G/DL (ref 14–18)
IMM GRANULOCYTES # BLD AUTO: 0.03 K/UL (ref 0–0.11)
IMM GRANULOCYTES NFR BLD AUTO: 0.4 % (ref 0–0.9)
LYMPHOCYTES # BLD AUTO: 2.01 K/UL (ref 1–4.8)
LYMPHOCYTES NFR BLD: 25.4 % (ref 22–41)
MAGNESIUM SERPL-MCNC: 2.1 MG/DL (ref 1.5–2.5)
MCH RBC QN AUTO: 28.1 PG (ref 27–33)
MCHC RBC AUTO-ENTMCNC: 33.1 G/DL (ref 33.7–35.3)
MCV RBC AUTO: 84.9 FL (ref 81.4–97.8)
MONOCYTES # BLD AUTO: 0.56 K/UL (ref 0–0.85)
MONOCYTES NFR BLD AUTO: 7.1 % (ref 0–13.4)
NEUTROPHILS # BLD AUTO: 4.91 K/UL (ref 1.82–7.42)
NEUTROPHILS NFR BLD: 61.9 % (ref 44–72)
NRBC # BLD AUTO: 0 K/UL
NRBC BLD-RTO: 0 /100 WBC
PHOSPHATE SERPL-MCNC: 3.8 MG/DL (ref 2.5–4.5)
PLATELET # BLD AUTO: 252 K/UL (ref 164–446)
PMV BLD AUTO: 10.7 FL (ref 9–12.9)
POTASSIUM SERPL-SCNC: 4.2 MMOL/L (ref 3.6–5.5)
RBC # BLD AUTO: 5.09 M/UL (ref 4.7–6.1)
SODIUM SERPL-SCNC: 133 MMOL/L (ref 135–145)
UFH PPP CHRO-ACNC: 0.26 IU/ML
WBC # BLD AUTO: 7.9 K/UL (ref 4.8–10.8)

## 2021-03-14 PROCEDURE — B2101ZZ FLUOROSCOPY OF SINGLE CORONARY ARTERY USING LOW OSMOLAR CONTRAST: ICD-10-PCS | Performed by: INTERNAL MEDICINE

## 2021-03-14 PROCEDURE — A9270 NON-COVERED ITEM OR SERVICE: HCPCS | Performed by: INTERNAL MEDICINE

## 2021-03-14 PROCEDURE — 85025 COMPLETE CBC W/AUTO DIFF WBC: CPT

## 2021-03-14 PROCEDURE — 99232 SBSQ HOSP IP/OBS MODERATE 35: CPT | Performed by: STUDENT IN AN ORGANIZED HEALTH CARE EDUCATION/TRAINING PROGRAM

## 2021-03-14 PROCEDURE — 700105 HCHG RX REV CODE 258: Performed by: NURSE PRACTITIONER

## 2021-03-14 PROCEDURE — 700101 HCHG RX REV CODE 250

## 2021-03-14 PROCEDURE — 36415 COLL VENOUS BLD VENIPUNCTURE: CPT

## 2021-03-14 PROCEDURE — 99153 MOD SED SAME PHYS/QHP EA: CPT

## 2021-03-14 PROCEDURE — 92928 PRQ TCAT PLMT NTRAC ST 1 LES: CPT | Mod: RC | Performed by: INTERNAL MEDICINE

## 2021-03-14 PROCEDURE — 83735 ASSAY OF MAGNESIUM: CPT

## 2021-03-14 PROCEDURE — 700102 HCHG RX REV CODE 250 W/ 637 OVERRIDE(OP): Performed by: INTERNAL MEDICINE

## 2021-03-14 PROCEDURE — 770020 HCHG ROOM/CARE - TELE (206)

## 2021-03-14 PROCEDURE — 700117 HCHG RX CONTRAST REV CODE 255: Performed by: INTERNAL MEDICINE

## 2021-03-14 PROCEDURE — 85520 HEPARIN ASSAY: CPT

## 2021-03-14 PROCEDURE — 99152 MOD SED SAME PHYS/QHP 5/>YRS: CPT | Performed by: INTERNAL MEDICINE

## 2021-03-14 PROCEDURE — 80069 RENAL FUNCTION PANEL: CPT

## 2021-03-14 PROCEDURE — A9270 NON-COVERED ITEM OR SERVICE: HCPCS | Performed by: HOSPITALIST

## 2021-03-14 PROCEDURE — 99233 SBSQ HOSP IP/OBS HIGH 50: CPT | Performed by: INTERNAL MEDICINE

## 2021-03-14 PROCEDURE — 93454 CORONARY ARTERY ANGIO S&I: CPT | Mod: 26,59 | Performed by: INTERNAL MEDICINE

## 2021-03-14 PROCEDURE — 700102 HCHG RX REV CODE 250 W/ 637 OVERRIDE(OP): Performed by: HOSPITALIST

## 2021-03-14 PROCEDURE — 93010 ELECTROCARDIOGRAM REPORT: CPT | Performed by: INTERNAL MEDICINE

## 2021-03-14 PROCEDURE — 027034Z DILATION OF CORONARY ARTERY, ONE ARTERY WITH DRUG-ELUTING INTRALUMINAL DEVICE, PERCUTANEOUS APPROACH: ICD-10-PCS | Performed by: INTERNAL MEDICINE

## 2021-03-14 PROCEDURE — 700111 HCHG RX REV CODE 636 W/ 250 OVERRIDE (IP)

## 2021-03-14 PROCEDURE — 93005 ELECTROCARDIOGRAM TRACING: CPT | Performed by: INTERNAL MEDICINE

## 2021-03-14 PROCEDURE — 700111 HCHG RX REV CODE 636 W/ 250 OVERRIDE (IP): Performed by: INTERNAL MEDICINE

## 2021-03-14 PROCEDURE — 85347 COAGULATION TIME ACTIVATED: CPT

## 2021-03-14 RX ORDER — VERAPAMIL HYDROCHLORIDE 2.5 MG/ML
INJECTION, SOLUTION INTRAVENOUS
Status: COMPLETED
Start: 2021-03-14 | End: 2021-03-14

## 2021-03-14 RX ORDER — HEPARIN SODIUM 1000 [USP'U]/ML
INJECTION, SOLUTION INTRAVENOUS; SUBCUTANEOUS
Status: COMPLETED
Start: 2021-03-14 | End: 2021-03-14

## 2021-03-14 RX ORDER — SODIUM CHLORIDE 9 MG/ML
INJECTION, SOLUTION INTRAVENOUS CONTINUOUS
Status: DISCONTINUED | OUTPATIENT
Start: 2021-03-14 | End: 2021-03-15 | Stop reason: HOSPADM

## 2021-03-14 RX ORDER — MIDAZOLAM HYDROCHLORIDE 1 MG/ML
INJECTION INTRAMUSCULAR; INTRAVENOUS
Status: COMPLETED
Start: 2021-03-14 | End: 2021-03-14

## 2021-03-14 RX ORDER — BIVALIRUDIN 250 MG/5ML
INJECTION, POWDER, LYOPHILIZED, FOR SOLUTION INTRAVENOUS
Status: COMPLETED
Start: 2021-03-14 | End: 2021-03-14

## 2021-03-14 RX ORDER — LIDOCAINE HYDROCHLORIDE 20 MG/ML
INJECTION, SOLUTION INFILTRATION; PERINEURAL
Status: COMPLETED
Start: 2021-03-14 | End: 2021-03-14

## 2021-03-14 RX ORDER — HEPARIN SODIUM 200 [USP'U]/100ML
INJECTION, SOLUTION INTRAVENOUS
Status: COMPLETED
Start: 2021-03-14 | End: 2021-03-14

## 2021-03-14 RX ADMIN — NITROGLYCERIN 10 ML: 20 INJECTION INTRAVENOUS at 12:30

## 2021-03-14 RX ADMIN — ASPIRIN 81 MG: 81 TABLET, COATED ORAL at 06:07

## 2021-03-14 RX ADMIN — HEPARIN SODIUM: 1000 INJECTION, SOLUTION INTRAVENOUS; SUBCUTANEOUS at 12:54

## 2021-03-14 RX ADMIN — TICAGRELOR 90 MG: 90 TABLET ORAL at 06:06

## 2021-03-14 RX ADMIN — TAMSULOSIN HYDROCHLORIDE 0.4 MG: 0.4 CAPSULE ORAL at 06:07

## 2021-03-14 RX ADMIN — MIDAZOLAM HYDROCHLORIDE 1 MG: 1 INJECTION, SOLUTION INTRAMUSCULAR; INTRAVENOUS at 12:53

## 2021-03-14 RX ADMIN — HEPARIN SODIUM 19.7 UNITS/KG/HR: 5000 INJECTION, SOLUTION INTRAVENOUS; SUBCUTANEOUS at 10:33

## 2021-03-14 RX ADMIN — FENTANYL CITRATE 75 MCG: 50 INJECTION, SOLUTION INTRAMUSCULAR; INTRAVENOUS at 12:53

## 2021-03-14 RX ADMIN — HEPARIN SODIUM 2000 UNITS: 1000 INJECTION, SOLUTION INTRAVENOUS; SUBCUTANEOUS at 07:44

## 2021-03-14 RX ADMIN — SODIUM CHLORIDE: 9 INJECTION, SOLUTION INTRAVENOUS at 11:36

## 2021-03-14 RX ADMIN — CARVEDILOL 6.25 MG: 6.25 TABLET, FILM COATED ORAL at 18:02

## 2021-03-14 RX ADMIN — CARVEDILOL 6.25 MG: 6.25 TABLET, FILM COATED ORAL at 07:54

## 2021-03-14 RX ADMIN — IOHEXOL 52 ML: 350 INJECTION, SOLUTION INTRAVENOUS at 13:05

## 2021-03-14 RX ADMIN — LIDOCAINE HYDROCHLORIDE: 20 INJECTION, SOLUTION INFILTRATION; PERINEURAL at 12:30

## 2021-03-14 RX ADMIN — TAMSULOSIN HYDROCHLORIDE 0.4 MG: 0.4 CAPSULE ORAL at 18:02

## 2021-03-14 RX ADMIN — HEPARIN SODIUM 2000 UNITS: 200 INJECTION, SOLUTION INTRAVENOUS at 12:30

## 2021-03-14 RX ADMIN — TICAGRELOR 90 MG: 90 TABLET ORAL at 18:06

## 2021-03-14 RX ADMIN — VERAPAMIL HYDROCHLORIDE 5 MG: 2.5 INJECTION, SOLUTION INTRAVENOUS at 12:30

## 2021-03-14 RX ADMIN — ROSUVASTATIN CALCIUM 40 MG: 20 TABLET, FILM COATED ORAL at 18:03

## 2021-03-14 ASSESSMENT — ENCOUNTER SYMPTOMS
CHOKING: 0
CHEST TIGHTNESS: 0
COUGH: 0
APNEA: 0
WHEEZING: 0
SHORTNESS OF BREATH: 0
STRIDOR: 0

## 2021-03-14 NOTE — PROGRESS NOTES
"Owen's Xa was lost in the lab per Daquan MARINO  0300.  Redraw was placed.   notified me that he would be there \"asap\" to draw the lab.  Took approximately 45 minutes, 0400.    Xa not resulted until 0611am.  However, Patient's IV had infiltrated in the night.  IV was removed and 2 new access sites started.  Report given to day nurse and Heparin gtt resumed at 19.7 units/kg/hr  "

## 2021-03-14 NOTE — PROGRESS NOTES
Cardiology Follow Up Progress Note    Date of Service  3/14/2021    Attending Physician  Armand Tee M.D.    Transferred from [facility] with acute chest pain, EKG revealed inferior/posterior ST elevation MI    PMH: CVA x2, recent diagnosis prostate cancer      Interim Events    No overnight cardiac events  S/p PTCA/PCI OM2 3/13/21  S/p PTCA/PCI mid RCA 3/14/2021  EF 60%  Cr 1.61, repeat in a.m. BMP  Adjust meds for BP <140/80      Review of Systems  Review of Systems   Respiratory: Negative for apnea, cough, choking, chest tightness, shortness of breath, wheezing and stridor.        Vital signs in last 24 hours  Temp:  [36.1 °C (97 °F)-37 °C (98.6 °F)] 36.4 °C (97.5 °F)  Pulse:  [57-69] 63  Resp:  [12-18] 12  BP: (139-171)/() 148/97  SpO2:  [91 %-97 %] 94 %    Physical Exam  Physical Exam  Cardiovascular:      Rate and Rhythm: Normal rate and regular rhythm.   Pulmonary:      Effort: Pulmonary effort is normal.   Skin:     General: Skin is warm.      Comments: Tr band intact, excellent circulation   Neurological:      General: No focal deficit present.      Mental Status: He is alert.   Psychiatric:         Mood and Affect: Mood normal.         Lab Review  Lab Results   Component Value Date/Time    WBC 7.9 03/14/2021 04:10 AM    RBC 5.09 03/14/2021 04:10 AM    HEMOGLOBIN 14.3 03/14/2021 04:10 AM    HEMATOCRIT 43.2 03/14/2021 04:10 AM    MCV 84.9 03/14/2021 04:10 AM    MCH 28.1 03/14/2021 04:10 AM    MCHC 33.1 (L) 03/14/2021 04:10 AM    MPV 10.7 03/14/2021 04:10 AM      Lab Results   Component Value Date/Time    SODIUM 133 (L) 03/14/2021 04:10 AM    POTASSIUM 4.2 03/14/2021 04:10 AM    CHLORIDE 99 03/14/2021 04:10 AM    CO2 21 03/14/2021 04:10 AM    GLUCOSE 116 (H) 03/14/2021 04:10 AM    BUN 25 (H) 03/14/2021 04:10 AM    CREATININE 1.61 (H) 03/14/2021 04:10 AM      Lab Results   Component Value Date/Time    ASTSGOT 31 03/12/2021 03:47 PM    ALTSGPT 14 03/12/2021 03:47 PM     Lab Results   Component Value  Date/Time    CHOLSTRLTOT 159 03/13/2021 03:58 AM    LDL 91 03/13/2021 03:58 AM    HDL 45 03/13/2021 03:58 AM    TRIGLYCERIDE 116 03/13/2021 03:58 AM    TROPONINT 280 (H) 03/13/2021 05:58 AM       No results for input(s): NTPROBNP in the last 72 hours.    Cardiac Imaging and Procedures Review  EKG: Inferior STEMI    Echocardiogram:    LVEF 55 to 60%  No valvular abnormalities    Cardiac Catheterization:    3/13/2021  Two-vessel CAD with high-grade proximal to mid OM and high-grade mid RCA  PTCA/PCI to proximal to mid OM 2    3/14/2021  Staged PCI  S/p PTCA/PCI to mid RCA    Imaging  Chest X-Ray:  Mild enlargement of the cardiomediastinal silhouette without acute cardiopulmonary disease.    Stress Test: Not applicable    Assessment/Plan    ST elevation inferior MI  Coronary angiography revealed two-vessel CAD with high-grade proximal to mid OM2 & high-grade mid RCA.  -Underwent successful PTCA/PCI to OM 23/30/21  -Staged PCI underwent PTCA/PCI to mid RCA 3/14/2021  -HX CVA, precludes use of Effient  -Intolerance to Plavix  -DAPT: Aspirin and Brilinta, along with Crestor 40, carvedilol 6.25 BID    Prior CVA  -Atorvastatin, ASA    CKD stage III  -Creatinine 1.6  -Repeat BMP in a.m.    Urinary retention requiring self cath  -On Flomax    Hypertension  -/70  -On carvedilol    Cardiology will follow along  Cardiac rehab, will arrange       Thank you for allowing me to participate in the care of this patient.      Please contact me with any questions.    ELENA De La Fuente.   Cardiologist, [facility]  [phone]

## 2021-03-14 NOTE — CARE PLAN
Problem: Safety  Goal: Will remain free from falls  Outcome: PROGRESSING AS EXPECTED  Note: Pt remains free from falls at this time. Safety precautions in place. Pt educated on calling for assistance when needed.        Problem: Knowledge Deficit  Goal: Knowledge of disease process/condition, treatment plan, diagnostic tests, and medications will improve  Outcome: PROGRESSING AS EXPECTED  Note: Pt updated on POC, tests, and medications. Pt verbalizes understanding and has no further questions at this time. Pt educated on calling for any more questions.

## 2021-03-14 NOTE — PROGRESS NOTES
Hospital Medicine Daily Progress Note    Date of Service  3/14/2021    Chief Complaint  81 y.o. male admitted 3/12/2021 as transfer from [place] for chest pain with STEMI    Hospital Course  81 y.o. male who presented 3/12/2021 with a previous medical history that includes BPH, dyslipidemia, patent foramen ovale, previous stroke, GERD, distant history of polio, stage III chronic kidney disease.     Patient presents today after being transferred from [place] where he presented with complaint of chest pain.  Patient reports chest pain that began last night.  Symptoms came on and off several times, seem to be worse with activity but the patient was uncertain.  On presentation to the transferring facility, he was found to have ST segment elevation consistent with a STEMI.  He was treated symptomatically with nitroglycerin, given aspirin, and an oral beta-blocker and then started on a heparin drip.  He was then transferred emergently down to us.  By the time he arrived here, his chest pain had resolved, and his ST segment elevations had come back into the normal range.  He has since been evaluated by cardiology here in the ED, and the plan is to take him to cardiac Cath Lab tomorrow morning as long as he remains chest pain-free overnight.     Of note the patient had a ureteral stent removed yesterday, for treatment of a kidney stone.    Interval Problem Update  3/13: planned for LHC, await finding. Continue medical management of CAD.    3/: s/p LHC yesterday -found to have two-vessel CAD - s/p PCI to proximal to mid obtuse marginal, planned for interventional RCA today.  Continue ASA/Brilinta.  Anticipate LHC today for PCI to RCA, suspect discharge medically stable for discharge tomorrow.    Consultants/Specialty  Cardiology    Code Status  Full Code    Disposition  Probable discharge tomorrow    Review of Systems  Review of Systems   All other systems reviewed and are negative.     See HPI for  details    Physical Exam  Temp:  [35.9 °C (96.7 °F)-37 °C (98.6 °F)] 37 °C (98.6 °F)  Pulse:  [57-71] 69  Resp:  [16-18] 18  BP: (139-171)/() 148/99  SpO2:  [91 %-97 %] 94 %    Physical Exam  Constitutional:       Appearance: Normal appearance.   HENT:      Head: Normocephalic and atraumatic.      Nose: Nose normal.      Mouth/Throat:      Mouth: Mucous membranes are dry.      Pharynx: Oropharynx is clear.   Eyes:      General: No scleral icterus.     Extraocular Movements: Extraocular movements intact.      Pupils: Pupils are equal, round, and reactive to light.   Cardiovascular:      Rate and Rhythm: Normal rate and regular rhythm.      Pulses: Normal pulses.      Heart sounds: No friction rub.   Pulmonary:      Effort: Pulmonary effort is normal.      Breath sounds: No wheezing or rales.   Abdominal:      Palpations: Abdomen is soft.      Tenderness: There is no abdominal tenderness. There is no guarding or rebound.   Musculoskeletal:         General: No swelling or tenderness. Normal range of motion.      Cervical back: Normal range of motion and neck supple. No tenderness.   Skin:     General: Skin is warm and dry.      Capillary Refill: Capillary refill takes 2 to 3 seconds.   Neurological:      General: No focal deficit present.      Mental Status: He is alert and oriented to person, place, and time.      Motor: No weakness.   Psychiatric:         Mood and Affect: Mood normal.         Fluids    Intake/Output Summary (Last 24 hours) at 3/14/2021 1119  Last data filed at 3/14/2021 0855  Gross per 24 hour   Intake 480 ml   Output 995 ml   Net -515 ml       Laboratory  Recent Labs     03/12/21  1547 03/13/21  0358 03/14/21  0410   WBC 10.5 7.0 7.9   RBC 5.21 5.04 5.09   HEMOGLOBIN 14.5 14.0 14.3   HEMATOCRIT 45.0 42.0 43.2   MCV 86.4 83.3 84.9   MCH 27.8 27.8 28.1   MCHC 32.2* 33.3* 33.1*   RDW 42.2 40.9 42.3   PLATELETCT 257 241 252   MPV 11.0 10.6 10.7     Recent Labs     03/12/21  1547 03/13/21  0358  03/14/21  0410   SODIUM 138 139 133*   POTASSIUM 5.2 3.9 4.2   CHLORIDE 104 105 99   CO2 23 22 21   GLUCOSE 116* 99 116*   BUN 24* 27* 25*   CREATININE 1.40 1.47* 1.61*   CALCIUM 9.6 9.6 9.7     Recent Labs     03/12/21  1529 03/12/21  1547 03/12/21  1751   APTT >240.0* 51.1* 31.3   INR 1.16* 1.11 1.04         Recent Labs     03/13/21  0358   TRIGLYCERIDE 116   HDL 45   LDL 91       Imaging  EC-ECHOCARDIOGRAM COMPLETE W/O CONT   Final Result      DX-CHEST-LIMITED (1 VIEW)   Final Result      Mild enlargement of the cardiomediastinal silhouette without acute cardiopulmonary disease.      CL-LEFT HEART CATHETERIZATION WITH POSSIBLE INTERVENTION    (Results Pending)   CL-LEFT HEART CATHETERIZATION WITH POSSIBLE INTERVENTION    (Results Pending)        Assessment/Plan  * Acute ST elevation myocardial infarction (STEMI) of inferior wall (HCC)  Assessment & Plan  Known CAD  Heparin gtt  ASA/Brilinta/BB/statin  PRN nitro SL, morphine  F/u echo  Cardiology f/u appreciated -- planned for [facility] 3/13    CAD (coronary artery disease)  Assessment & Plan  [facility] 3/13: two-vessel disease, proximal to mid obtuse marginal  [facility] 3/14: planned for PCI to RCA    ASA/Brilinta    Contrast dye induced nephropathy  Assessment & Plan  Admission Cr 1.4 >>> 1.61  S/p [facility] x2 -- 2L IVF for gentle hydration    LATASHA (acute kidney injury) (HCC)  Assessment & Plan  As noted in contrast induced nephropathy    Stage 3a chronic kidney disease  Assessment & Plan  CKD III    Stroke (HCC)- (present on admission)  Assessment & Plan  Distant h/o, noted to have PFO before, not seen  Resume ASA/statin    Hyperglycemia  Assessment & Plan  HbA1c 5.8  Diet and lifestyle modifcations    GERD (gastroesophageal reflux disease)- (present on admission)  Assessment & Plan  Asymptomatic    Hypertension- (present on admission)  Assessment & Plan  On Coreg for CAD -- titrate up as tolerated  Avoid nephrotoxic agent at this time  Optimize meds as tolerated and as permitted by  renal function    Patent foramen ovale- (present on admission)  Assessment & Plan  h/o  Not seen on repeat echo       VTE prophylaxis: heparin gtt

## 2021-03-14 NOTE — PROGRESS NOTES
TR band taken off. Gauze and tegarderm applied. No signs of bleeding or hematoma. Will monitor site.

## 2021-03-14 NOTE — PROCEDURES
DATE OF PROCEDURE:  03/14/2021     REFERRING PHYSICIAN:  Dania Thomason MD     PROCEDURES:  1.  Coronary angiography.  2.  Percutaneous transluminal coronary angioplasty/stent placement of the mid   right coronary artery.  3.  Monitored conscious sedation.     PREPROCEDURE DIAGNOSIS:  Status post acute inferior myocardial infarction,   STEMI.     POSTPROCEDURE DIAGNOSES:  1.  High-grade mid right coronary artery stenosis associated with thrombus.  2.  Successful percutaneous transluminal coronary angioplasty/stent placement   of the mid right coronary artery with 3.5x38 mm [place] drug-eluting stent.     INDICATIONS:  The patient is an 81-year-old male who suffered STEMI.  He   underwent cardiac catheterization yesterday, which showed high-grade stenosis   of a long large caliber obtuse marginal branch and a high-grade mid right   coronary artery stenosis associated with thrombus.  As he was started on   antiplatelet therapy, it was elected to continue with IV heparin and Brilinta   to help clear out the right coronary artery thrombus.  The left high-grade   stenosis was intervened successfully yesterday.  He was brought back to the   cardiac catheterization laboratory for percutaneous intervention of the right   coronary artery.     DESCRIPTION OF PROCEDURE:  The patient was brought to the cardiac   catheterization laboratory.  He was prepped and draped in the usual sterile   manner.  The right wrist area was anesthetized with 2% Xylocaine.  A 6-[place]   JR4 guide catheter was positioned into the right coronary artery.  IV heparin   was adjusted.  A [place] wire was used to cross the identified stenosis.  The   stenosis was predilated with 2.5 x 20 mm Euphora balloon.  A 3.5 x 38 mm [place]   drug-eluting stent was successfully positioned and deployed.  The patient   tolerated the procedure well.  At the end of procedure, all wires, balloons,   guide and sheaths were removed.  Hemoband was placed on the right  wrist.  He   was transferred back to [facility] in stable condition.     HEMODYNAMIC DATA:  Hemodynamic data shows aortic pressures of 140/90 mmHg  with mean of 120 mmHg.     AORTIC VALVE:  Aortic valve was not crossed.     LEFT VENTRICULOGRAM:  Left ventriculogram was not performed.     ANGIOGRAM:  Please refer to the cardiac catheterization report on 03/13/2001.    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  Predilatation with 2.5x20 mm Euphora balloon.  Stent with 3.5 x 38   mm [place] drug-eluting stent.     IMPRESSION:  1.  High-grade mid right coronary artery stenosis associated with thrombus.  2.  Successful percutaneous transluminal coronary angioplasty/stent placement   of the mid right coronary artery with 3.5x38 mm [place] drug-eluting stent.     RECOMMENDATIONS:  Recommend to continue with Brilinta.    SEDATION TIME: The patient's sedation was managed by myself with continuous   face to face time with the patient for 15 minutes from 12:20 to 12:35.        ______________________________  MD RADHA PAULINO/LIZ/CRESCENCIO    DD:  03/14/2021 13:07  DT:  03/14/2021 13:42    Job#:  [id number]

## 2021-03-14 NOTE — PROGRESS NOTES
Bedside report received from night shift RN. Pt alert with no complaints of pain at this time. No signs or symptoms of resp distress noted or reported on RA. Bed in low and locked position, call button within reach and fall precautions are in place    No Xa drawn at 0200  Heparin gtt rate change and bolus performed with Sonido Gomez based on 0600 lab result  Charge RN notified

## 2021-03-14 NOTE — PROGRESS NOTES
Pt back from cath, placed on tele box and monitor room notified. Pt alert but lethargic. Signed and held orders released. Right wrist TR band in place. Bed in low and locked position, call button within reach and fall precautions are in place

## 2021-03-14 NOTE — ASSESSMENT & PLAN NOTE
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

## 2021-03-14 NOTE — CARE PLAN
Problem: Fluid Volume:  Goal: Will maintain balanced intake and output  3/13/2021 2312 by Fabiola Torres R.N.  Outcome: PROGRESSING AS EXPECTED  3/13/2021 2310 by Fabiola Torres R.N.  Outcome: PROGRESSING AS EXPECTED     Problem: Communication  Goal: The ability to communicate needs accurately and effectively will improve  3/13/2021 2312 by Fabiola Torres R.N.  Outcome: PROGRESSING AS EXPECTED  3/13/2021 2310 by Fabiola Torres R.N.  Outcome: PROGRESSING AS EXPECTED     Problem: Safety  Goal: Will remain free from injury  3/13/2021 2312 by Fabiola Torres R.N.  Outcome: PROGRESSING AS EXPECTED  3/13/2021 2310 by Fabiola Torres R.N.  Outcome: PROGRESSING AS EXPECTED  Goal: Will remain free from falls  3/13/2021 2312 by Fabiola Torres R.N.  Outcome: PROGRESSING AS EXPECTED  3/13/2021 2310 by Fabiola Torres R.N.  Outcome: PROGRESSING AS EXPECTED     Problem: Infection  Goal: Will remain free from infection  3/13/2021 2312 by Fabiola Torres R.N.  Outcome: PROGRESSING AS EXPECTED  3/13/2021 2310 by Fabiola Torres R.N.  Outcome: PROGRESSING AS EXPECTED     Problem: Venous Thromboembolism (VTW)/Deep Vein Thrombosis (DVT) Prevention:  Goal: Patient will participate in Venous Thrombosis (VTE)/Deep Vein Thrombosis (DVT)Prevention Measures  3/13/2021 2312 by Fabiola Torres R.N.  Outcome: PROGRESSING AS EXPECTED  3/13/2021 2310 by Fabiola Torres R.N.  Outcome: PROGRESSING AS EXPECTED     Problem: Bowel/Gastric:  Goal: Normal bowel function is maintained or improved  3/13/2021 2312 by Fabiola Torres R.N.  Outcome: PROGRESSING AS EXPECTED  3/13/2021 2310 by Fabiola Torres R.N.  Outcome: PROGRESSING AS EXPECTED  Goal: Will not experience complications related to bowel motility  3/13/2021 2312 by Fabiola Torres R.N.  Outcome: PROGRESSING AS EXPECTED  3/13/2021 2310 by Fabiola Torres R.N.  Outcome: PROGRESSING AS EXPECTED     Problem:  Knowledge Deficit  Goal: Knowledge of disease process/condition, treatment plan, diagnostic tests, and medications will improve  3/13/2021 2312 by Fabiola Torres R.N.  Outcome: PROGRESSING AS EXPECTED  3/13/2021 2310 by Fabiola Torres R.N.  Outcome: PROGRESSING AS EXPECTED  Goal: Knowledge of the prescribed therapeutic regimen will improve  3/13/2021 2312 by Fabiola Torres R.N.  Outcome: PROGRESSING AS EXPECTED  3/13/2021 2310 by Fabiola Torres R.N.  Outcome: PROGRESSING AS EXPECTED     Problem: Discharge Barriers/Planning  Goal: Patient's continuum of care needs will be met  3/13/2021 2312 by Fabiola Torres R.N.  Outcome: PROGRESSING AS EXPECTED  3/13/2021 2310 by Fabiola Torres R.N.  Outcome: PROGRESSING AS EXPECTED     Problem: Respiratory:  Goal: Respiratory status will improve  3/13/2021 2312 by Fabiola Torres R.N.  Outcome: PROGRESSING AS EXPECTED  3/13/2021 2310 by Fabiola Torres R.N.  Outcome: PROGRESSING AS EXPECTED     Problem: Unstable Angina/Chest Pain/Non Stemi  Goal: Optimal Care of the Angina/Chest Pain/Non Stemi Patient  Outcome: PROGRESSING AS EXPECTED     Problem: Stemi  Goal: Optimal Care of the Stemi Patient  Outcome: PROGRESSING AS EXPECTED     Problem: Safety  Goal: Free from accidental injury  Outcome: PROGRESSING AS EXPECTED  Goal: Free from intentional harm  Outcome: PROGRESSING AS EXPECTED  Goal: Free from self harm  Outcome: PROGRESSING AS EXPECTED  Goal: Isolation Precautions for patient and staff safety  Outcome: PROGRESSING AS EXPECTED     Problem: Psychosocial needs  Goal: Spiritual needs incorporated in hospitalization  Outcome: PROGRESSING AS EXPECTED  Goal: Cultural needs incorporated in hospitalization  Outcome: PROGRESSING AS EXPECTED  Goal: Anxiety reduction  Outcome: PROGRESSING AS EXPECTED     Problem: Discharge Barriers/Planning  Goal: Patient's Continuum of care needs are met  Outcome: PROGRESSING AS EXPECTED     Problem:  Hemodynamic Status  Goal: Stable Vital Signs and Fluid Balance  Outcome: PROGRESSING AS EXPECTED     Problem: Risk for Deep Vein Thrombosis/Venous Thromboembolism  Goal: DVT/VTE Prevention Measures in Place  Outcome: PROGRESSING AS EXPECTED  Goal: Patient participates in DVT/VTE Prevention Measures  Outcome: PROGRESSING AS EXPECTED

## 2021-03-14 NOTE — CARE PLAN
Problem: Fluid Volume:  Goal: Will maintain balanced intake and output  Outcome: PROGRESSING AS EXPECTED     Problem: Communication  Goal: The ability to communicate needs accurately and effectively will improve  Outcome: PROGRESSING AS EXPECTED     Problem: Safety  Goal: Will remain free from injury  Outcome: PROGRESSING AS EXPECTED  Goal: Will remain free from falls  Outcome: PROGRESSING AS EXPECTED     Problem: Infection  Goal: Will remain free from infection  Outcome: PROGRESSING AS EXPECTED     Problem: Venous Thromboembolism (VTW)/Deep Vein Thrombosis (DVT) Prevention:  Goal: Patient will participate in Venous Thrombosis (VTE)/Deep Vein Thrombosis (DVT)Prevention Measures  Outcome: PROGRESSING AS EXPECTED     Problem: Bowel/Gastric:  Goal: Normal bowel function is maintained or improved  Outcome: PROGRESSING AS EXPECTED  Goal: Will not experience complications related to bowel motility  Outcome: PROGRESSING AS EXPECTED     Problem: Knowledge Deficit  Goal: Knowledge of disease process/condition, treatment plan, diagnostic tests, and medications will improve  Outcome: PROGRESSING AS EXPECTED  Goal: Knowledge of the prescribed therapeutic regimen will improve  Outcome: PROGRESSING AS EXPECTED     Problem: Discharge Barriers/Planning  Goal: Patient's continuum of care needs will be met  Outcome: PROGRESSING AS EXPECTED     Problem: Respiratory:  Goal: Respiratory status will improve  Outcome: PROGRESSING AS EXPECTED

## 2021-03-15 ENCOUNTER — TELEPHONE (OUTPATIENT)
Dept: CARDIOLOGY | Facility: MEDICAL CENTER | Age: 81
End: 2021-03-15

## 2021-03-15 ENCOUNTER — PHARMACY VISIT (OUTPATIENT)
Dept: PHARMACY | Facility: MEDICAL CENTER | Age: 81
End: 2021-03-15
Payer: COMMERCIAL

## 2021-03-15 VITALS
HEIGHT: 69 IN | WEIGHT: 189.82 LBS | DIASTOLIC BLOOD PRESSURE: 75 MMHG | TEMPERATURE: 98.1 F | BODY MASS INDEX: 28.11 KG/M2 | HEART RATE: 78 BPM | SYSTOLIC BLOOD PRESSURE: 146 MMHG | OXYGEN SATURATION: 97 % | RESPIRATION RATE: 18 BRPM

## 2021-03-15 LAB
ALBUMIN SERPL BCP-MCNC: 3.7 G/DL (ref 3.2–4.9)
ANION GAP SERPL CALC-SCNC: 12 MMOL/L (ref 7–16)
BASOPHILS # BLD AUTO: 0.5 % (ref 0–1.8)
BASOPHILS # BLD: 0.04 K/UL (ref 0–0.12)
BUN SERPL-MCNC: 25 MG/DL (ref 8–22)
CALCIUM SERPL-MCNC: 9.2 MG/DL (ref 8.5–10.5)
CHLORIDE SERPL-SCNC: 103 MMOL/L (ref 96–112)
CO2 SERPL-SCNC: 21 MMOL/L (ref 20–33)
CREAT SERPL-MCNC: 1.62 MG/DL (ref 0.5–1.4)
EOSINOPHIL # BLD AUTO: 0.17 K/UL (ref 0–0.51)
EOSINOPHIL NFR BLD: 2.1 % (ref 0–6.9)
ERYTHROCYTE [DISTWIDTH] IN BLOOD BY AUTOMATED COUNT: 42.1 FL (ref 35.9–50)
GLUCOSE SERPL-MCNC: 115 MG/DL (ref 65–99)
HCT VFR BLD AUTO: 39.5 % (ref 42–52)
HGB BLD-MCNC: 13.2 G/DL (ref 14–18)
IMM GRANULOCYTES # BLD AUTO: 0.02 K/UL (ref 0–0.11)
IMM GRANULOCYTES NFR BLD AUTO: 0.2 % (ref 0–0.9)
LYMPHOCYTES # BLD AUTO: 1.93 K/UL (ref 1–4.8)
LYMPHOCYTES NFR BLD: 23.9 % (ref 22–41)
MAGNESIUM SERPL-MCNC: 2 MG/DL (ref 1.5–2.5)
MCH RBC QN AUTO: 28.4 PG (ref 27–33)
MCHC RBC AUTO-ENTMCNC: 33.4 G/DL (ref 33.7–35.3)
MCV RBC AUTO: 85.1 FL (ref 81.4–97.8)
MONOCYTES # BLD AUTO: 0.69 K/UL (ref 0–0.85)
MONOCYTES NFR BLD AUTO: 8.6 % (ref 0–13.4)
NEUTROPHILS # BLD AUTO: 5.22 K/UL (ref 1.82–7.42)
NEUTROPHILS NFR BLD: 64.7 % (ref 44–72)
NRBC # BLD AUTO: 0 K/UL
NRBC BLD-RTO: 0 /100 WBC
PHOSPHATE SERPL-MCNC: 3.9 MG/DL (ref 2.5–4.5)
PLATELET # BLD AUTO: 221 K/UL (ref 164–446)
PMV BLD AUTO: 10.5 FL (ref 9–12.9)
POTASSIUM SERPL-SCNC: 4.2 MMOL/L (ref 3.6–5.5)
RBC # BLD AUTO: 4.64 M/UL (ref 4.7–6.1)
SODIUM SERPL-SCNC: 136 MMOL/L (ref 135–145)
WBC # BLD AUTO: 8.1 K/UL (ref 4.8–10.8)

## 2021-03-15 PROCEDURE — 36415 COLL VENOUS BLD VENIPUNCTURE: CPT

## 2021-03-15 PROCEDURE — A9270 NON-COVERED ITEM OR SERVICE: HCPCS | Performed by: INTERNAL MEDICINE

## 2021-03-15 PROCEDURE — 85025 COMPLETE CBC W/AUTO DIFF WBC: CPT

## 2021-03-15 PROCEDURE — 99232 SBSQ HOSP IP/OBS MODERATE 35: CPT | Performed by: INTERNAL MEDICINE

## 2021-03-15 PROCEDURE — 700102 HCHG RX REV CODE 250 W/ 637 OVERRIDE(OP): Performed by: INTERNAL MEDICINE

## 2021-03-15 PROCEDURE — 97535 SELF CARE MNGMENT TRAINING: CPT

## 2021-03-15 PROCEDURE — 83735 ASSAY OF MAGNESIUM: CPT

## 2021-03-15 PROCEDURE — A9270 NON-COVERED ITEM OR SERVICE: HCPCS | Performed by: HOSPITALIST

## 2021-03-15 PROCEDURE — 99239 HOSP IP/OBS DSCHRG MGMT >30: CPT | Performed by: STUDENT IN AN ORGANIZED HEALTH CARE EDUCATION/TRAINING PROGRAM

## 2021-03-15 PROCEDURE — 80069 RENAL FUNCTION PANEL: CPT

## 2021-03-15 PROCEDURE — 700102 HCHG RX REV CODE 250 W/ 637 OVERRIDE(OP): Performed by: HOSPITALIST

## 2021-03-15 PROCEDURE — RXMED WILLOW AMBULATORY MEDICATION CHARGE: Performed by: STUDENT IN AN ORGANIZED HEALTH CARE EDUCATION/TRAINING PROGRAM

## 2021-03-15 RX ORDER — ROSUVASTATIN CALCIUM 40 MG/1
40 TABLET, COATED ORAL EVERY EVENING
Qty: 30 TABLET | Refills: 11 | Status: SHIPPED | OUTPATIENT
Start: 2021-03-15 | End: 2021-03-15 | Stop reason: SDUPTHER

## 2021-03-15 RX ORDER — CARVEDILOL 6.25 MG/1
6.25 TABLET ORAL 2 TIMES DAILY WITH MEALS
Qty: 180 TABLET | Refills: 0 | Status: SHIPPED | OUTPATIENT
Start: 2021-03-15

## 2021-03-15 RX ORDER — ROSUVASTATIN CALCIUM 40 MG/1
40 TABLET, COATED ORAL EVERY EVENING
Qty: 90 TABLET | Refills: 0 | Status: SHIPPED | OUTPATIENT
Start: 2021-03-15

## 2021-03-15 RX ORDER — CARVEDILOL 6.25 MG/1
6.25 TABLET ORAL 2 TIMES DAILY WITH MEALS
Qty: 60 TABLET | Refills: 0 | Status: SHIPPED | OUTPATIENT
Start: 2021-03-15 | End: 2021-03-15 | Stop reason: SDUPTHER

## 2021-03-15 RX ORDER — ASPIRIN 81 MG/1
81 TABLET ORAL DAILY
Qty: 90 TABLET | Refills: 0 | Status: SHIPPED | OUTPATIENT
Start: 2021-03-16

## 2021-03-15 RX ORDER — ASPIRIN 81 MG/1
81 TABLET ORAL DAILY
Qty: 30 TABLET | Refills: 0 | Status: SHIPPED | OUTPATIENT
Start: 2021-03-16 | End: 2021-03-15 | Stop reason: SDUPTHER

## 2021-03-15 RX ADMIN — TICAGRELOR 90 MG: 90 TABLET ORAL at 04:40

## 2021-03-15 RX ADMIN — CARVEDILOL 6.25 MG: 6.25 TABLET, FILM COATED ORAL at 08:01

## 2021-03-15 RX ADMIN — ASPIRIN 81 MG: 81 TABLET, COATED ORAL at 04:40

## 2021-03-15 RX ADMIN — TAMSULOSIN HYDROCHLORIDE 0.4 MG: 0.4 CAPSULE ORAL at 04:40

## 2021-03-15 NOTE — DISCHARGE INSTRUCTIONS
Discharge Instructions    Discharged to home by car with friend. Discharged via wheelchair, hospital escort: Yes.  Special equipment needed: Not Applicable    Be sure to schedule a follow-up appointment with your primary care doctor or any specialists as instructed.     Discharge Plan: Follow up with cardiology and your primary care provider.  Take your medications as prescribed.  Return to the emergency room if your symptoms return.  Diet Plan: Discussed  Activity Level: Discussed  Confirmed Follow up Appointment: Appointment Scheduled  Confirmed Symptoms Management: Discussed  Medication Reconciliation Updated: Yes  Influenza Vaccine Indication: Not indicated: Previously immunized this influenza season and > 8 years of age    I understand that a diet low in cholesterol, fat, and sodium is recommended for good health. Unless I have been given specific instructions below for another diet, I accept this instruction as my diet prescription.     Other diet: Cardiac Healthy Diet    Angiogram, Angioplasty, or Stent Placement  Care After  One of the following procedures was done today.  ANGIOGRAM:  A catheter was placed through the blood vessel in your groin, contrast was injected into the vessels, and pictures were taken.  ANGIOPLASTY:  A catheter was placed through the blood vessel in your groin and directed to an area of blocked blood flow. A balloon, and possibly a metal stent were used to open the blockage. If no other blockages are present below this area, your symptoms should improve. If blockages are present below this area, surgery may still be necessary.  STENT:  A catheter was placed in your groin through which a metal mesh tube was placed in a narrowed part of the artery to facilitate blood flow.  You were given intravenous sedation. These medications are rapidly cleared from your bloodstream. You may feel some discomfort at the insertion site after the local anesthetic wears off. This discomfort should  gradually improve over the next several days.  · Only take over-the-counter or prescription medicines for pain, discomfort, or fever as directed by your caregiver.  · Complications are very uncommon after this procedure. Go to the nearest emergency department if you develop any of the following symptoms:  · Worsening pain.  · Bleeding.  · Swelling at the puncture site.  · Lightheadedness.  · Dizziness or fainting.  · Fever or chills.  · If oozing, bleeding, or a lump appears at the puncture site, apply firm pressure directly to the site steadily for 15 minutes and go to the emergency department.  · Keep the skin around the insertion site dry. You may take showers after 24 hours. If the area does get wet, dry the skin completely. Avoid baths until the skin puncture site heals, usually 5 to 7 days.  · Development of redness, increased soreness, or swelling may be signs of a skin infection. Contact your physician.  · Rest for the remainder of the day and avoid any heavy lifting (more than 10 pounds or 4.5 kg). Do not operate heavy machinery, drive, or make legal decisions for the first 24 hours after the procedure. Have a responsible person drive you home.  · You may resume your usual diet after the procedure. Avoid alcoholic beverages for 24 hours after the procedure.      Radial Site Care    This sheet gives you information about how to care for yourself after your procedure. Your health care provider may also give you more specific instructions. If you have problems or questions, contact your health care provider.  What can I expect after the procedure?  After the procedure, it is common to have:  · Bruising and tenderness at the catheter insertion area.  Follow these instructions at home:  Medicines  · Take over-the-counter and prescription medicines only as told by your health care provider.  Insertion site care  · Follow instructions from your health care provider about how to take care of your insertion site.  Make sure you:  ? Wash your hands with soap and water before you change your bandage (dressing). If soap and water are not available, use hand .  ? Change your dressing as told by your health care provider.  ? Leave stitches (sutures), skin glue, or adhesive strips in place. These skin closures may need to stay in place for 2 weeks or longer. If adhesive strip edges start to loosen and curl up, you may trim the loose edges. Do not remove adhesive strips completely unless your health care provider tells you to do that.  · Check your insertion site every day for signs of infection. Check for:  ? Redness, swelling, or pain.  ? Fluid or blood.  ? Pus or a bad smell.  ? Warmth.  · Do not take baths, swim, or use a hot tub until your health care provider approves.  · You may shower 24-48 hours after the procedure, or as directed by your health care provider.  ? Remove the dressing and gently wash the site with plain soap and water.  ? Pat the area dry with a clean towel.  ? Do not rub the site. That could cause bleeding.  · Do not apply powder or lotion to the site.  Activity    · For 24 hours after the procedure, or as directed by your health care provider:  ? Do not flex or bend the affected arm.  ? Do not push or pull heavy objects with the affected arm.  ? Do not drive yourself home from the hospital or clinic. You may drive 24 hours after the procedure unless your health care provider tells you not to.  ? Do not operate machinery or power tools.  · Do not lift anything that is heavier than 10 lb (4.5 kg), or the limit that you are told, until your health care provider says that it is safe.  · Ask your health care provider when it is okay to:  ? Return to work or school.  ? Resume usual physical activities or sports.  ? Resume sexual activity.  General instructions  · If the catheter site starts to bleed, raise your arm and put firm pressure on the site. If the bleeding does not stop, get help right away.  This is a medical emergency.  · If you went home on the same day as your procedure, a responsible adult should be with you for the first 24 hours after you arrive home.  · Keep all follow-up visits as told by your health care provider. This is important.  Contact a health care provider if:  · You have a fever.  · You have redness, swelling, or yellow drainage around your insertion site.  Get help right away if:  · You have unusual pain at the radial site.  · The catheter insertion area swells very fast.  · The insertion area is bleeding, and the bleeding does not stop when you hold steady pressure on the area.  · Your arm or hand becomes pale, cool, tingly, or numb.  These symptoms may represent a serious problem that is an emergency. Do not wait to see if the symptoms will go away. Get medical help right away. Call your local emergency services (911 in the [place] Do not drive yourself to the hospital.  Summary  · After the procedure, it is common to have bruising and tenderness at the site.  · Follow instructions from your health care provider about how to take care of your radial site wound. Check the wound every day for signs of infection.  · Do not lift anything that is heavier than 10 lb (4.5 kg), or the limit that you are told, until your health care provider says that it is safe.  This information is not intended to replace advice given to you by your health care provider. Make sure you discuss any questions you have with your health care provider.    Ticagrelor (Brilinta) oral tablet  What is this medicine?  TICAGRELOR (TERRI ka GREL or) helps to prevent blood clots. This medicine is used to prevent heart attack, stroke, or other vascular events in people who have had a recent heart attack or who have severe chest pain.  This medicine may be used for other purposes; ask your health care provider or pharmacist if you have questions.  COMMON BRAND NAME(S): BRILINTA  What should I tell my health care provider  before I take this medicine?  They need to know if you have any of these conditions:  · bleeding disorders  · bleeding in the brain  · having surgery  · history of irregular heartbeat  · history of stomach bleeding  · liver disease  · an unusual or allergic reaction to ticagrelor, other medicines, foods, dyes, or preservatives  · pregnant or trying to get pregnant  · breast-feeding  How should I use this medicine?  Take this medicine by mouth with a glass of water. Follow the directions on the prescription label. You can take it with or without food. If it upsets your stomach, take it with food. Take your medicine at regular intervals. Do not take it more often than directed. Do not stop taking except on your doctor's advice.  Talk to your pediatrician regarding the use of this medicine in children. Special care may be needed.  Overdosage: If you think you have taken too much of this medicine contact a poison control center or emergency room at once.  NOTE: This medicine is only for you. Do not share this medicine with others.  What if I miss a dose?  If you miss a dose, take it as soon as you can. If it is almost time for your next dose, take only that dose. Do not take double or extra doses.  What may interact with this medicine?  Do not take this medicine with any of the following medications:  · defibrotide  · itraconazole  This medicine may also interact with the following medications:  · aspirin  · certain antibiotics like clarithromycin, telithromycin, and rifampin  · certain antiviral medicines for HIV or AIDS like atazanavir, indinavir, nelfinavir, ritonavir, and saquinavir  · certain medicines for cholesterol like lovastatin and simvastatin  · certain medicines for fungal infections like ketoconazole and voriconazole  · certain medicines for seizures like carbamazepine, phenobarbital, and phenytoin  · digoxin  · lovastatin  · narcotic medicines for pain  · nefazodone  This list may not describe all  possible interactions. Give your health care provider a list of all the medicines, herbs, non-prescription drugs, or dietary supplements you use. Also tell them if you smoke, drink alcohol, or use illegal drugs. Some items may interact with your medicine.  What should I watch for while using this medicine?  Visit your healthcare professional for regular checks on your progress. Your condition will be monitored carefully while you are receiving this medicine. It is important not to miss any appointments.  Notify your doctor or health care professional and seek emergency treatment if you develop breathing problems; changes in vision; chest pain; severe, sudden headache; pain, swelling, warmth in the leg; trouble speaking; sudden numbness or weakness of the face, arm, or leg. These can be signs that your condition has gotten worse.  If you are going to have surgery or dental work, tell your doctor or health care professional that you are taking this medicine.  You should take aspirin every day with this medicine. Do not take more than 100 mg each day. Talk to your healthcare professional if you have questions.  What side effects may I notice from receiving this medicine?  Side effects that you should report to your doctor or health care professional as soon as possible:  · allergic reactions like skin rash, itching or hives, swelling of the face, lips, or tongue  · breathing problems  · fast or irregular heartbeat  · feeling faint or light-headed, falls  · signs and symptoms of bleeding such as bloody or black, tarry stools; red or dark-brown urine; spitting up blood or brown material that looks like coffee grounds; red spots on the skin; unusual bruising or bleeding from the eye, gums, or nose  Side effects that usually do not require medical attention (report to your doctor or health care professional if they continue or are bothersome):  · breast enlargement in both males and  females  · diarrhea  · dizziness  · headache  · tiredness  · upset stomach  This list may not describe all possible side effects. Call your doctor for medical advice about side effects. You may report side effects to FDA at 2-489-FOH-7134.  Where should I keep my medicine?  Keep out of the reach of children.  Store at room temperature of 59 to 86 degrees F (15 to 30 degrees C). Throw away any unused medicine after the expiration date.  NOTE: This sheet is a summary. It may not cover all possible information. If you have questions about this medicine, talk to your doctor, pharmacist, or health care provider.    Special Instructions: Diagnosis:  Acute Coronary Syndrome (ACS) is a diagnosis that encompasses cardiac-related chest pain and heart attack. ACS occurs when the blood flow to the heart muscle is severely reduced or cut off completely due to a slow process called atherosclerosis.  Atherosclerosis is a disease in which the coronary arteries become narrow from a buildup of fat, cholesterol, and other substances that combine to form plaque. If the plaque breaks, a blood clot will form and block the blood flow to the heart muscle. This lack of blood flow can cause damage or death to the heart muscle which is called a heart attack or Myocardial Infarction (MI). There are two different types of MIs:  ST Elevation Myocardial Infarction or STEMI (the most severe type of heart attack) and Non-ST Elevation Myocardial Infarction or NSTEMI.    Treatment Plan:  · Cardiac Diet  - Low fat, low salt, low cholesterol   · Cardiac Rehab  - Your doctor has ordered you a referral to [facility].  Call [phone] to schedule an appointment.  · Attend my follow-up appointment with my Cardiologist.  · Take my medications as prescribed by my doctor  · Exercise daily  · lower my blood pressure    Medications:  Certain medications are used to treat ACS.  Remember to always take medications as prescribed and never stop talking medications  unless told by your doctor.    You have been prescribed the following medicatons:    Anti-platelet/blood thinner - Your Anti-platelet/Blood thinning medication is called Brilinta, and is used in combination with aspirin to prevent clots from forming in your heart and/or around your stent.  Your doctor will determine how long you need to be on this medicine.      Heart Attack  A heart attack occurs when blood and oxygen supply to the heart is cut off. A heart attack causes damage to the heart that cannot be fixed. A heart attack is also called a myocardial infarction, or MI. If you think you are having a heart attack, do not wait to see if the symptoms will go away. Get medical help right away.  What are the causes?  This condition may be caused by:  · A fatty substance (plaque) in the blood vessels (arteries). This can block the flow of blood to the heart.  · A blood clot in the blood vessels that go to the heart. The blood clot blocks blood flow.  · Low blood pressure.  · An abnormal heartbeat.  · Some diseases, such as problems in red blood cells (anemia)orproblems in breathing (respiratory failure).  · Tightening (spasm) of a blood vessel that cuts off blood to the heart.  · A tear in a blood vessel of the heart.  · High blood pressure.  What increases the risk?  The following factors may make you more likely to develop this condition:  · Aging. The older you are, the higher your risk.  · Having a personal or family history of chest pain, heart attack, stroke, or narrowing of the arteries in the legs, arms, head, or stomach (peripheral artery disease).  · Being male.  · Smoking.  · Not getting regular exercise.  · Being overweight or obese.  · Having high blood pressure.  · Having high cholesterol.  · Having diabetes.  · Drinking too much alcohol.  · Using illegal drugs, such as cocaine or methamphetamine.  What are the signs or symptoms?  Symptoms of this condition include:  · Chest pain. It may feel  like:  ? Crushing or squeezing.  ? Tightness, pressure, fullness, or heaviness.  · Pain in the arm, neck, jaw, back, or upper body.  · Shortness of breath.  · Heartburn.  · Upset stomach (indigestion).  · Feeling like you may vomit (nauseous).  · Cold sweats.  · Feeling tired.  · Sudden light-headedness.  How is this treated?  A heart attack must be treated as soon as possible. Treatment may include:  · Medicines to:  ? Break up or dissolve blood clots.  ? Thin blood and help prevent blood clots.  ? Treat blood pressure.  ? Improve blood flow to the heart.  ? Reduce pain.  ? Reduce cholesterol.  · Procedures to widen a blocked artery and keep it open.  · Open heart surgery.  · Receiving oxygen.  · Making your heart strong again (cardiac rehabilitation) through exercise, education, and counseling.  Follow these instructions at home:  Medicines  · Take over-the-counter and prescription medicines only as told by your doctor. You may need to take medicine:  ? To keep your blood from clotting too easily.  ? To control blood pressure.  ? To lower cholesterol.  ? To control heart rhythms.  · Do not take these medicines unless your doctor says it is okay:  ? NSAIDs, such as ibuprofen.  ? Supplements that have vitamin A, vitamin E, or both.  ? Hormone replacement therapy that has estrogen with or without progestin.  Lifestyle         · Do not use any products that have nicotine or tobacco, such as cigarettes, e-cigarettes, and chewing tobacco. If you need help quitting, ask your doctor.  · Avoid secondhand smoke.  · Exercise regularly. Ask your doctor about a cardiac rehab program.  · Eat heart-healthy foods. Your doctor will tell you what foods to eat.  · Stay at a healthy weight.  · Lower your stress level.  · Do not use illegal drugs.  Alcohol use  · Do not drink alcohol if:  ? Your doctor tells you not to drink.  ? You are pregnant, may be pregnant, or are planning to become pregnant.  · If you drink alcohol:  ? Limit  how much you use to:  § 0-1 drink a day for women.  § 0-2 drinks a day for men.  ? Know how much alcohol is in your drink. In the [place] one drink equals one 12 oz bottle of beer (355 mL), one 5 oz glass of wine (148 mL), or one 1½ oz glass of hard liquor (44 mL).  General instructions  · Work with your doctor to treat other problems you may have, such as diabetes or high blood pressure.  · Get screened for depression. Get treatment if needed.  · Keep your vaccines up to date. Get the flu shot (influenza vaccine) every year.  · Keep all follow-up visits as told by your doctor. This is important.  Contact a doctor if:  · You feel very sad.  · You have trouble doing your daily activities.  Get help right away if:  · You have sudden, unexplained discomfort in your chest, arms, back, neck, jaw, or upper body.  · You have shortness of breath.  · You have sudden sweating or clammy skin.  · You feel like you may vomit.  · You vomit.  · You feel tired or weak.  · You get light-headed or dizzy.  · You feel your heart beating fast.  · You feel your heart skipping beats.  · You have blood pressure that is higher than 180/120.  These symptoms may be an emergency. Do not wait to see if the symptoms will go away. Get medical help right away. Call your local emergency services (911 in the [place] Do not drive yourself to the hospital.  Summary  · A heart attack occurs when blood and oxygen supply to the heart is cut off.  · Do not take NSAIDs unless your doctor says it is okay.  · Do not smoke. Avoid secondhand smoke.  · Exercise regularly. Ask your doctor about a cardiac rehab program.  This information is not intended to replace advice given to you by your health care provider. Make sure you discuss any questions you have with your health care provider.        Acute Coronary Syndrome  Acute coronary syndrome (ACS) is a serious problem in which there is suddenly not enough blood and oxygen reaching the heart. ACS can result in  chest pain or a heart attack.  This condition is a medical emergency. If you have any symptoms of this condition, get help right away.  What are the causes?  This condition may be caused by:  · A buildup of fat and cholesterol inside the arteries (atherosclerosis). This is the most common cause. The buildup (plaque) can cause blood vessels in the heart (coronary arteries) to become narrow or blocked, which reduces blood flow to the heart. Plaque can also break off and lead to a clot, which can block an artery and cause a heart attack or stroke.  · Sudden tightening of the muscles around the coronary arteries (coronary spasm).  · Tearing of a coronary artery (spontaneous coronary artery dissection).  · Very low blood pressure (hypotension).  · An abnormal heartbeat (arrhythmia).  · Other medical conditions that cause a decrease of oxygen to the heart, such as anemiaorrespiratory failure.  · Using cocaine or methamphetamine.  What increases the risk?  The following factors may make you more likely to develop this condition:  · Age. The risk for ACS increases as you get older.  · History of chest pain, heart attack, peripheral artery disease, or stroke.  · Having taken chemotherapy or immune-suppressing medicines.  · Being male.  · Family history of chest pain, heart disease, or stroke.  · Smoking.  · Not exercising enough.  · Being overweight.  · High cholesterol.  · High blood pressure (hypertension).  · Diabetes.  · Excessive alcohol use.  What are the signs or symptoms?  Common symptoms of this condition include:  · Chest pain. The pain may last a long time, or it may stop and come back (recur). It may feel like:  ? Crushing or squeezing.  ? Tightness, pressure, fullness, or heaviness.  · Arm, neck, jaw, or back pain.  · Heartburn or indigestion.  · Shortness of breath.  · Nausea.  · Sudden cold sweats.  · Light-headedness.  · Dizziness or passing out.  · Tiredness (fatigue).  Sometimes there are no symptoms.  How  is this diagnosed?  This condition may be diagnosed based on:  · Your medical history and symptoms.  · Imaging tests, such as:  ? An electrocardiogram (ECG). This measures the heart's electrical activity.  ? X-rays.  ? CT scan.  ? A coronary angiogram. For this test, dye is injected into the heart arteries and then X-rays are taken.  ? Myocardial perfusion imaging. This test shows how well blood flows through your heart muscle.  · Blood tests. These may be repeated at certain time intervals.  · Exercise stress testing.  · Echocardiogram. This is a test that uses sound waves to produce detailed images of the heart.  How is this treated?  Treatment for this condition may include:  · Oxygen therapy.  · Medicines, such as:  ? Antiplatelet medicines and blood-thinning medicines, such as aspirin. These help prevent blood clots.  ? Medicine that dissolves any blood clots (fibrinolytic therapy).  ? Blood pressure medicines.  ? Nitroglycerin. This helps widen blood vessels to improve blood flow.  ? Pain medicine.  ? Cholesterol-lowering medicine.  · Surgery, such as:  ? Coronary angioplasty with stent placement. This involves placing a small piece of metal that looks like mesh or a spring into a narrow coronary artery. This widens the artery and keeps it open.  ? Coronary artery bypass surgery. This involves taking a section of a blood vessel from a different part of your body and placing it on the blocked coronary artery to allow blood to flow around the blockage.  · Cardiac rehabilitation. This is a program that includes exercise training, education, and counseling to help you recover.  Follow these instructions at home:  Eating and drinking  · Eat a heart-healthy diet that includes whole grains, fruits and vegetables, lean proteins, and low-fat or nonfat dairy products.  · Limit how much salt (sodium) you eat as told by your health care provider. Follow instructions from your health care provider about any other eating or  drinking restrictions, such as limiting foods that are high in fat and processed sugars.  · Use healthy cooking methods such as roasting, grilling, broiling, baking, poaching, steaming, or stir-frying.  · Work with a dietitian to follow a heart-healthy eating plan.  Medicines  · Take over-the-counter and prescription medicines only as told by your health care provider.  · Do not take these medicines unless your health care provider approves:  ? Vitamin supplements that contain vitamin A or vitamin E.  ? NSAIDs, such as ibuprofen, naproxen, or celecoxib.  ? Hormone replacement therapy that contains estrogen.  · If you are taking blood thinners:  ? Talk with your health care provider before you take any medicines that contain aspirin or NSAIDs. These medicines increase your risk for dangerous bleeding.  ? Take your medicine exactly as told, at the same time every day.  ? Avoid activities that could cause injury or bruising, and follow instructions about how to prevent falls.  ? Wear a medical alert bracelet, and carry a card that lists what medicines you take.  Activity  · Follow your cardiac rehabilitation program. Do exercises as told by your physical therapist.  · Ask your health care provider what activities and exercises are safe for you. Follow his or her instructions about lifting, driving, or climbing stairs.  Lifestyle  · Do not use any products that contain nicotine or tobacco, such as cigarettes, e-cigarettes, and chewing tobacco. If you need help quitting, ask your health care provider.  · Do not drink alcohol if your health care provider tells you not to drink.  · If you drink alcohol:  ? Limit how much you have to 0-1 drink a day.  ? Be aware of how much alcohol is in your drink. In the [place] one drink equals one 12 oz bottle of beer (355 mL), one 5 oz glass of wine (148 mL), or one 1½ oz glass of hard liquor (44 mL).  · Maintain a healthy weight. If you need to lose weight, work with your health care  provider to do so safely.  General instructions  · Tell all the health care providers who provide care for you about your heart condition, including your dentist. This may affect the medicines or treatment you receive.  · Manage any other health conditions you have, such as hypertension or diabetes. These conditions affect your heart.  · Pay attention to your mental health. You may be at higher risk for depression.  ? Find ways to manage stress.  ? Talk to your health care provider about depression screening and treatment.  · Keep your vaccinations up to date.  ? Get the flu shot (influenza vaccine) every year.  ? Get the pneumococcal vaccine if you are age 65 or older.  · If directed, monitor your blood pressure at home.  · Keep all follow-up visits as told by your health care provider. This is important.  Contact a health care provider if you:  · Feel overwhelmed or sad.  · Have trouble doing your daily activities.  Get help right away if you:  · Have pain in your chest, neck, arm, jaw, stomach, or back that recurs, and:  ? It lasts for more than a few minutes.  ? It is not relieved by taking the medicineyour health care provider prescribed.  · Have unexplained:  ? Heavy sweating.  ? Heartburn or indigestion.  ? Nausea or vomiting.  ? Shortness of breath.  ? Difficulty breathing.  ? Fatigue.  ? Nervousness or anxiety.  ? Weakness.  ? Diarrhea.  ? Dark stools or blood in your stool.  · Have sudden light-headedness or dizziness.  · Have blood pressure that is higher than 180/120.  · Faint.  · Have thoughts about hurting yourself.  These symptoms may represent a serious problem that is an emergency. Do not wait to see if the symptoms will go away. Get medical help right away. Call your local emergency services (911 in the [place] Do not drive yourself to the hospital.   Summary  · Acute coronary syndrome (ACS) is when there is not enough blood and oxygen being supplied to the heart. ACS can result in chest pain or a  heart attack.  · Acute coronary syndrome is a medical emergency. If you have any symptoms of this condition, get help right away.  · Treatment includes medicines and procedures to open the blocked arteries and restore blood flow.  This information is not intended to replace advice given to you by your health care provider. Make sure you discuss any questions you have with your health care provider.    Coronary Artery Disease, Risk Factors  Research has shown that the risk of developing coronary artery disease (CAD) and having a heart attack increases with each factor you have.  RISK FACTORS YOU CANNOT CHANGE  · Your age. Your risk goes up as you get older. Most heart attacks happen to people over the age of 65.  · Gender. Men have a greater risk of heart attack than women, and they have attacks earlier in life. However, women are more likely to die from a heart attack.  · Heredity. Children of parents with heart disease are more likely to develop it themselves.  · Race.  Americans and other ethnic groups have a higher risk, possibly because of high blood pressure, a tendency toward obesity, and diabetes.  · Your family. Most people with a strong family history of heart disease have one or more other risk factors.  RISK FACTORS YOU CAN CHANGE  · Exposure to tobacco smoke. Even secondhand smoke greatly increases the risk for heart disease.  · High blood cholesterol may be lowered with changes in diet, activity, and medicines.  · High blood pressure makes the heart work harder. This causes the heart muscles to become thick and, eventually, weaker. It also increases your risk of stroke, heart attack, and kidney or heart failure.  · Physical inactivity is a risk factor for CAD. Regular physical activity helps prevent heart and blood vessel disease. Exercise helps control blood cholesterol, diabetes, obesity, and it may help lower blood pressure in some people.  · Excess body fat, especially belly fat, increases  "the risk of heart disease and stroke even if there are no other risk factors. Excess weight increases the heart's workload and raises blood pressure and blood cholesterol.  · Diabetes seriously increases your risk of developing CAD. If you have diabetes, you should work with your caregiver to manage it and control other risk factors.  OTHER RISK FACTORS FOR CAD  · How you respond to stress.  · Drinking too much alcohol may raise blood pressure, cause heart failure, and lead to stroke.  · Total cholesterol greater than 200 milligrams.  · HDL (good) cholesterol less than 40 milligrams. HDL helps keep cholesterol from building up in the walls of the arteries.  PREVENTING CAD  · Maintain a healthy weight.  · Exercise or do physical activity.  · Eat a heart-healthy diet low in fat and salt and high in fiber.  · Control your blood pressure to keep it below 120 over 80.  · Keep your cholesterol at a level that lowers your risk.  · Manage diabetes if you have it.  · Stop smoking.  · Learn how to manage stress.  HEART SMART SUBSTITUTIONS  · Instead of whole or 2% milk and cream, use skim milk.  · Instead of fried foods, eat baked, steamed, boiled, broiled, or microwaved foods.  · Instead of lard, butter, palm and coconut oils, cook with unsaturated vegetable oils, such as corn, olive, canola, safflower, sesame, soybean, sunflower, or peanut.  · Instead of fatty cuts of meat, eat lean cuts of meat or cut off the fatty parts.  · Instead of 1 whole egg in recipes, use 2 egg whites.  · Instead of sauces, butter, and salt, season vegetables with herbs and spices.  · Instead of regular hard and processed cheeses, eat low-fat, low-sodium cheeses.  · Instead of salted potato chips, choose low-fat, unsalted tortilla and potato chips and unsalted pretzels and popcorn.  · Instead of sour cream and mayonnaise, use plain low-fat yogurt, low-fat cottage cheese, or low-fat or \"light\" sour cream.  FOR MORE INFORMATION   National Heart Lung " and Blood [place]: www.nhlbi.nih.gov/health/hearttruth  American Heart Association: www.heart.org/HEARTORG    · Is patient discharged on Warfarin / Coumadin?   No     Depression / Suicide Risk    As you are discharged from this [facility] facility, it is important to learn how to keep safe from harming yourself.    Recognize the warning signs:  · Abrupt changes in personality, positive or negative- including increase in energy   · Giving away possessions  · Change in eating patterns- significant weight changes-  positive or negative  · Change in sleeping patterns- unable to sleep or sleeping all the time   · Unwillingness or inability to communicate  · Depression  · Unusual sadness, discouragement and loneliness  · Talk of wanting to die  · Neglect of personal appearance   · Rebelliousness- reckless behavior  · Withdrawal from people/activities they love  · Confusion- inability to concentrate     If you or a loved one observes any of these behaviors or has concerns about self-harm, here's what you can do:  · Talk about it- your feelings and reasons for harming yourself  · Remove any means that you might use to hurt yourself (examples: pills, rope, extension cords, firearm)  · Get professional help from the community (Mental Health, Substance Abuse, psychological counseling)  · Do not be alone:Call your Safe Contact- someone whom you trust who will be there for you.  · Call your local CRISIS HOTLINE 258-4621 or 410-176-4126  · Call your local Children's Mobile Crisis Response Team [place] (964) 795-9253 or www.[employer]  · Call the toll free National Suicide Prevention Hotlines   · National Suicide Prevention Lifeline 582-157-DSOW (3299)  · National Hope Line Network 800-SUICIDE (100-1354)

## 2021-03-15 NOTE — DISCHARGE PLANNING
Anticipated Discharge Disposition: Home    Action: Brilinta is covered with a $0.00 copay. The [facility] pharmacy will deliver this medication via meds-to-bed.    Barriers to Discharge: None.    Plan: D/C after medications are delivered.     Update 1033: Patient notified RN CM that his ID and 2 insurance cards were never handed back to him in the ED. WOODROW WILLS called admitting, and was transferred to [facility] ([id number]). [facility] does not have his cards. They transferred RN RU to the Peds check-in where lost cards go. They didn't answer.    RN CM went down to the ED personally, and looked all over [facility], there are no cards. RN RU went to the Peds Check-in, looked through the book where lost cards/ID's get placed. This patient's cards were not there either. RN RU spoke with the PAR's down in the ED, they don't have any cards either. RN CM then called both Safe-keeping and lost-and-found. Neither of them have this patient's ID.    Updated patient and his wife at bedside. They are very unhappy, however are OK to discharge. Information verified on facesheet. Told family that we can mail them the cards if they are found.

## 2021-03-15 NOTE — CARE PLAN
Problem: Communication  Goal: The ability to communicate needs accurately and effectively will improve  Outcome: PROGRESSING AS EXPECTED     Problem: Safety  Goal: Will remain free from injury  Outcome: PROGRESSING AS EXPECTED     Problem: Knowledge Deficit  Goal: Knowledge of disease process/condition, treatment plan, diagnostic tests, and medications will improve  Outcome: PROGRESSING AS EXPECTED     Problem: Respiratory:  Goal: Respiratory status will improve  Outcome: PROGRESSING AS EXPECTED     Problem: Safety  Goal: Free from accidental injury  Outcome: PROGRESSING AS EXPECTED

## 2021-03-15 NOTE — PROGRESS NOTES
Bedside report received from day nurse. Assumed care of patient. Pt. resting comfortably without any sign of distress. A&Ox4, VSS, no complaints at this time. POC reviewed and white board updated, Tele box on, Call light in reach, Bed locked in lowest position. Post op vitals in effect until 2005.

## 2021-03-15 NOTE — DISCHARGE PLANNING
Meds-to-Beds: Discharge prescription orders listed below delivered to patient's bedside. WOODROW Lorenzo notified. Patient and his wife counseled. Patient elected to have co-payment billed to patient account.      Simon Orozco   Home Medication Instructions ДМИТРИЙ:[id number]    Printed on:03/15/21 1417   Medication Information                      aspirin 81 MG EC tablet  Take 1 tablet by mouth every day.             carvedilol (COREG) 6.25 MG Tab  Take 1 tablet by mouth 2 times a day, with meals.             rosuvastatin (CRESTOR) 40 MG tablet  Take 1 tablet by mouth every evening.             ticagrelor (BRILINTA) 90 MG Tab tablet  Take 1 tablet by mouth 2 Times a Day.                 Libia Danielle, PharmD

## 2021-03-15 NOTE — THERAPY
Physical Therapy   Initial Evaluation     Patient Name: Simon Orozco  Age:  81 y.o., Sex:  male  Medical Record #: [id number]  Today's Date: 3/15/2021          Assessment  Patient is 81 y.o. male with a diagnosis of Stemi, now s/p stent placement.  Additional factors influencing patient status / progress : Pt was seen for phase I cardiac rehab education with topics covered including talk test, RPE scale, gentle return to full exercise routine, pacing and phase II cardiac rehab was introduced. Pt will have assist as needed at home from his wife.       Plan      DC Equipment Recommendations: None  Discharge Recommendations: Recommend outpatient physical therapy services to address higher level deficits            Objective       03/15/21 1028   Education Group   Education Provided Cardiac Precautions   Cardiac Precautions Patient Response Patient;Acceptance;Explanation;Verbal Demonstration   Additional Comments phase I cardiac rehab.   Anticipated Discharge Equipment and Recommendations   DC Equipment Recommendations None   Discharge Recommendations Recommend outpatient physical therapy services to address higher level deficits

## 2021-03-15 NOTE — DISCHARGE SUMMARY
Discharge Summary    CHIEF COMPLAINT ON ADMISSION  Chief Complaint   Patient presents with   • Chest Pain     Pt started having chest pain after he was working out. Pt drove to [facility] where they did a chest pain workup discovering a inferior STEMI. Pt was then transferred here for IR. Pt is currently chest pain free. Pt recieved metoprolol, aspirin, nitro x3 and heparin. Cardiology and ERP at bedside upon arrival.        Reason for Admission  STEMI    Admission Date  3/12/2021    CODE STATUS  Full Code    HPI & HOSPITAL COURSE  81 y.o. 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  Cardiology was consulted at arrival, started on heparin drip.  Patient underwent left heart catheterization on 3/13/2021 - found to have 2-vessel disease s/p PCI to proximal to mid obtuse marginal, planned for intervention of RCA the following day. He subsequently underwent second left heart catheterization on 3/14/2021 - PCI intervention to RCA.  Patient was placed on DAPT with ASA/Brilinta.  Patient reported having shortness of breath with Brilinta, Effient was considered but contraindicated due to prior CVA-may challenge with Plavix if patient were to develop shortness of breath while on Brilinta.  Compliance with regimen of aspirin/Brilinta/Crestor-discussed in details at length.    His echo this admission showed 60% EF with no WMA -no BB/ACEI/ARB warranted. Of note, he did develop LATASHA with contrast induced nephropathy on CKD III - admission Cr 1.4 >> 1.62 at discharge. Patienr will f/u with cardiology outpatient with 1week with BMP lab.    Therefore, he is discharged in good and stable condition to home with close outpatient follow-up.    The patient met 2-midnight criteria for an inpatient stay at the time of discharge.    Discharge Date  3/15/2021    FOLLOW UP ITEMS POST DISCHARGE  STEMI,  obstructive CAD status post PCI intervention, hyperlipidemia-continue taking medications as [id number] with your cardiology    LATASHA-BMP in 1 week-follow-up with you PCP and cardiology      DISCHARGE DIAGNOSES  Principal Problem:    Acute ST elevation myocardial infarction (STEMI) of inferior wall (HCC) POA: Unknown  Active Problems:    CAD (coronary artery disease) POA: Unknown      Overview: Asymptomatic    Stroke (HCC) (Chronic) POA: Yes    Stage 3a chronic kidney disease POA: Unknown    LATASHA (acute kidney injury) (HCC) POA: Unknown    Contrast dye induced nephropathy POA: Unknown    GERD (gastroesophageal reflux disease) POA: Yes    Hyperglycemia POA: Unknown    Patent foramen ovale (Chronic) POA: Yes    Hypertension POA: Yes  Resolved Problems:    * No resolved hospital problems. *      FOLLOW UP  Future Appointments   Date Time Provider Department Center   3/31/2021 10:45 AM Dianne Mathews A.P.N. [facility] None     [address] [address]  [phone]  In 1 week      CARDIOLOGY [facility]  [address]  [facility] [address]  [phone]  In 1 week        MEDICATIONS ON DISCHARGE     Medication List      START taking these medications      Instructions   aspirin 81 MG EC tablet  Start taking on: March 16, 2021  Replaces: aspirin 81 MG tablet   Take 1 tablet by mouth every day.  Dose: 81 mg     carvedilol 6.25 MG Tabs  Commonly known as: COREG   Take 1 tablet by mouth 2 times a day, with meals.  Dose: 6.25 mg     rosuvastatin 40 MG tablet  Commonly known as: CRESTOR   Take 1 tablet by mouth every evening.  Dose: 40 mg     ticagrelor 90 MG Tabs tablet  Commonly known as: BRILINTA   Take 1 tablet by mouth 2 Times a Day.  Dose: 90 mg        CONTINUE taking these medications      Instructions   tamsulosin 0.4 MG capsule  Commonly known as: FLOMAX   Take 0.4 mg by mouth 2 Times a Day.  Dose: 0.4 mg     Vitamin D 50 MCG (2000 UT) Caps   Take 2,000 Units by  mouth every day.  Dose: 2,000 Units        STOP taking these medications    aspirin 81 MG tablet  Replaced by: aspirin 81 MG EC tablet     cefadroxil 500 MG capsule  Commonly known as: DURICEF     pravastatin 80 MG tablet  Commonly known as: PRAVACHOL            Allergies  Allergies   Allergen Reactions   • Plavix [Clopidogrel] Unspecified     Muscle weakness and fatigue    • Lipitor [Atorvastatin Calcium]      Muscle problems   • Lisinopril      Muscle problems       DIET  Orders Placed This Encounter   Procedures   • Diet Order Diet: Cardiac     Standing Status:   Standing     Number of Occurrences:   1     Order Specific Question:   Diet:     Answer:   Cardiac [6]       ACTIVITY  As tolerated.  Weight bearing as tolerated    CONSULTATIONS  Cardiology    PROCEDURES  DATE OF PROCEDURE:  03/13/2021      REFERRING PHYSICIAN:  Dania Thomason MD     PROCEDURES:  1.  Left heart catheterization.  2.  Coronary angiography.  3.  PTCA/stent placement of proximal to mid second obtuse marginal branch.  4.  Left ventriculogram.  5.  Monitor conscious sedation.    IMPRESSION:  1.  Two-vessel coronary artery disease with high-grade proximal to mid obtuse   marginal branch stenosis and high-grade mid right coronary artery stenosis.  2.  Successful PTCA/stent placement of the proximal to mid obtuse marginal   branch.  3.  Normal left ventricular systolic function with ejection fraction of 65%.  4.  Elevated left ventricular end diastolic pressure.     RECOMMENDATIONS:  I recommend to continue with Brilinta and perform   percutaneous intervention of the right coronary artery in a.m.     SEDATION TIME: The patient's sedation was managed by myself with continuous   face to face time with the patient for 15 minutes from 10:00 to 10:15.     ______________________________  MD RADHA PAULINO/RAHUL/HUGO     DD:  03/13/2021 11:25  DT:  03/13/2021 12:50        DATE OF PROCEDURE:  03/14/2021      REFERRING PHYSICIAN:  Dania MAHARAJ  CHIRS Thomason MD     PROCEDURES:  1.  Coronary angiography.  2.  Percutaneous transluminal coronary angioplasty/stent placement of the mid   right coronary artery.  3.  Monitored conscious sedation.    IMPRESSION:  1.  High-grade mid right coronary artery stenosis associated with thrombus.  2.  Successful percutaneous transluminal coronary angioplasty/stent placement   of the mid right coronary artery with 3.5x38 mm [place] drug-eluting stent.     RECOMMENDATIONS:  Recommend to continue with Brilinta.     SEDATION TIME: The patient's sedation was managed by myself with continuous   face to face time with the patient for 15 minutes from 12:20 to 12:35.           ______________________________  MD RADHA PAULINO/LIZ/CRESCENCIO     DD:  03/14/2021 13:07  DT:  03/14/2021 13:42    LABORATORY  Lab Results   Component Value Date    SODIUM 136 03/15/2021    POTASSIUM 4.2 03/15/2021    CHLORIDE 103 03/15/2021    CO2 21 03/15/2021    GLUCOSE 115 (H) 03/15/2021    BUN 25 (H) 03/15/2021    CREATININE 1.62 (H) 03/15/2021        Lab Results   Component Value Date    WBC 8.1 03/15/2021    HEMOGLOBIN 13.2 (L) 03/15/2021    HEMATOCRIT 39.5 (L) 03/15/2021    PLATELETCT 221 03/15/2021        Total time of the discharge process exceeds 46 minutes.

## 2021-03-24 ENCOUNTER — TELEPHONE (OUTPATIENT)
Dept: CARDIOLOGY | Facility: MEDICAL CENTER | Age: 81
End: 2021-03-24

## 2021-03-24 NOTE — TELEPHONE ENCOUNTER
I called and spoke with patient in regards to his blood work I received from [facility].    3/22/21:  Cr 1.7, BUN 33, Na 137, K 4.2, glucose 111    He states his urine was dark a couple days after d/c, but it is now a light yellow and he is making plenty of urine.   He straight caths himself 3 times per day.   He states he is trying to drink approx 64 oz water per day.     No new complaints, was thankful for my call.     Has appt for follow up with Leonor Moreno NP 4/5/21    Lima Randhawa PA-C  3/24/2021

## 2022-11-02 NOTE — PROGRESS NOTES
[facility] Cardiology Follow-up Note    Date of Service:    3/15/2021      Name:   Simon Orozco   YOB: 1940  Age:   81 y.o.  male   MRN:   [id number]      Chief Complaint: NSTEMI    Primary Cardiologist:  Dr. Thomason    HPI:  Mr Orozco is an 82 y/o fellow with PMH including hx of CVA in the past, CKD with recent dx of prostate cancer.  He presented to [facility] after having chest pain, found to have NSTEMI.  Taken for [facility] 3/13/21 with stent to OM2, staged PCI to RCA.      Interim Events:  He is feeling well today   Denies shortness of breath  Good UOP  Denies CP or leg swelling      ROS  Constitutional:  no fatigue.  Respiratory:  Denies shortness of breath, no cough.  Cardiovascular:  No chest pain.  no lower extremity edema.  Denies orthopnea or PND.  : + polyuria, no dysuria.  GI:  Denies nausea/vomiting.  No abdominal distention.  Neuro:  Denies dizziness, syncope.  Hem/lymph: Denies easy bleeding/bruising.      All other review of systems reviewed and negative.    Past medical, surgical, social, and family history reviewed and unchanged from admission except as noted in HPI.    Medications: Reviewed in MAR  Current Facility-Administered Medications   Medication Dose Frequency Provider Last Rate Last Admin   • NS infusion   Continuous SARAH De La Fuente.P.NArleen   Stopped at 03/14/21 1921   • tamsulosin (FLOMAX) capsule 0.4 mg  0.4 mg BID Jg Chau D.O.   0.4 mg at 03/15/21 0440   • senna-docusate (PERICOLACE or SENOKOT S) 8.6-50 MG per tablet 2 tablet  2 tablet BID JOELLE Márquez.GISSELL.        And   • polyethylene glycol/lytes (MIRALAX) PACKET 1 Packet  1 Packet QDAY PRN Jg Chau D.O.        And   • magnesium hydroxide (MILK OF MAGNESIA) suspension 30 mL  30 mL QDAY PRN Jg Chau D.O.        And   • bisacodyl (DULCOLAX) suppository 10 mg  10 mg QDAY PRN Jg Chau D.O.       • Respiratory Therapy Consult    55-[address] "Continuous RT Jg Chau D.O.       • acetaminophen (Tylenol) tablet 650 mg  650 mg Q6HRS PRN SUMA MárquezO.       • carvedilol (COREG) tablet 6.25 mg  6.25 mg BID WITH MEALS SUMA MárquezOArleen   6.25 mg at 03/14/21 1802   • morphine (pf) 4 mg/mL injection 2-4 mg  2-4 mg Q5 MIN PRN JOELLE Márquez.O.       • nitroglycerin (NITROSTAT) tablet 0.4 mg  0.4 mg Q5 MIN PRN JOELLE Márquez.O.       • ondansetron (ZOFRAN) syringe/vial injection 4 mg  4 mg Q4HRS PRN JOELLE Márquez.O.       • ondansetron (ZOFRAN ODT) dispertab 4 mg  4 mg Q4HRS PRN JOELLE Márquez.O.       • aspirin EC (ECOTRIN) tablet 81 mg  81 mg DAILY Dania Thomason M.D.   81 mg at 03/15/21 0440   • ticagrelor (BRILINTA) tablet 90 mg  90 mg BID Dania Thomason M.D.   90 mg at 03/15/21 0440   • rosuvastatin (CRESTOR) tablet 40 mg  40 mg Q EVENING Dania Thomason M.D.   40 mg at 03/14/21 1803   Last reviewed on 3/12/2021  4:55 PM by Theresa Alexis, T    Allergies   Allergen Reactions   • Plavix [Clopidogrel] Unspecified     Muscle weakness and fatigue    • Lipitor [Atorvastatin Calcium]      Muscle problems   • Lisinopril      Muscle problems       Physical Exam  Body mass index is 28.44 kg/m². /75   Pulse 78   Temp 36.7 °C (98.1 °F) (Temporal)   Resp 18   Ht 1.74 m (5' 8.5\")   Wt 86.1 kg (189 lb 13.1 oz)   SpO2 97%    Vitals:    03/14/21 1905 03/14/21 2015 03/14/21 2343 03/15/21 0400   BP: (!) 164/79 154/76 150/76 146/75   Pulse: 68 71 77 78   Resp: 16 18 18 18   Temp: 36.4 °C (97.5 °F) 36.6 °C (97.8 °F) 36.6 °C (97.8 °F) 36.7 °C (98.1 °F)   TempSrc: Temporal Temporal Temporal Temporal   SpO2: 97% 97% 97% 97%   Weight:       Height:        Oxygen Therapy:  Pulse Oximetry: 97 %, O2 (LPM): 0, O2 Delivery Device: None - Room Air    General: no apparent distress  Neck: no JVD   Lungs: normal effort,  without crackles, no wheezing or rhonchi  Heart: normal rate, " regular rhythm, no murmur, no rub  EXT: no lower extremity edema.  Right radial access site soft, non-tender, no hematoma. 2+ R radial pulse palpable.  Abdomen: soft, non tender, non distended  Neurological: No focal deficits, no facial asymmetry.  Normal speech  Psychiatric: Appropriate affect, alert and oriented x 3  Skin: Warm extremities, no rash    Labs (personally reviewed):     Lab Results   Component Value Date/Time    SODIUM 136 03/15/2021 04:12 AM    POTASSIUM 4.2 03/15/2021 04:12 AM    CHLORIDE 103 03/15/2021 04:12 AM    CO2 21 03/15/2021 04:12 AM    GLUCOSE 115 (H) 03/15/2021 04:12 AM    BUN 25 (H) 03/15/2021 04:12 AM    CREATININE 1.62 (H) 03/15/2021 04:12 AM     Lab Results   Component Value Date/Time    ALKPHOSPHAT 59 03/12/2021 03:47 PM    ASTSGOT 31 03/12/2021 03:47 PM    ALTSGPT 14 03/12/2021 03:47 PM    TBILIRUBIN 0.4 03/12/2021 03:47 PM      Lab Results   Component Value Date/Time    CHOLSTRLTOT 159 03/13/2021 03:58 AM    LDL 91 03/13/2021 03:58 AM    HDL 45 03/13/2021 03:58 AM    TRIGLYCERIDE 116 03/13/2021 03:58 AM         Cardiac Imaging and Procedures Review:      Personal Telemetry Review: SR in the 70s.    Echo 3/13/21:  CONCLUSIONS  Normal left ventricular systolic function.  Left ventricular ejection fraction is visually estimated to be 55-60%.  Normal right ventricular size and systolic function.  Unable to estimate pulmonary artery pressure due to an inadequate   tricuspid regurgitant jet.    [facility] 3/13/21:  HEMODYNAMIC DATA:  Hemodynamic data shows aortic pressures of 140/90 mmHg  with mean of 110 mmHg and /0 with LVEDP of 20 mmHg.     AORTIC VALVE:  There is no significant gradient noted.     LEFT VENTRICULOGRAM:  10 mL of contrast were delivered for 2 seconds.    Ejection fraction was estimated to be 65%.  There were no segmental wall   motion abnormalities noted.     ANGIOGRAM:  LEFT MAIN CORONARY ARTERY:  Left main coronary artery is a moderate length,   moderate-caliber vessel  without significant stenosis.     LEFT ANTERIOR DESCENDING ARTERY:  Left anterior descending artery is a long   moderate caliber vessel with mid calcified diffuse luminal irregularities of   20%-30%.  There is a moderate caliber first diagonal branch noted free of   disease.  There is a second moderate caliber diagonal branch with diffuse   luminal irregularities of 40%-50% stenosis.     LEFT CIRCUMFLEX ARTERY:  Left circumflex artery is a nondominant large caliber   vessel with large first obtuse marginal branch and noted free of disease.    There is a second large obtuse marginal branch with proximal concentric 99%   stenosis.     RIGHT CORONARY ARTERY:  Right coronary artery is a dominant moderate caliber   vessel with diffuse luminal irregularities and possible thrombus in the mid   portion.  Distally, there are small posterior descending artery branch noted   free of disease.     IMPRESSION:  1.  Two-vessel coronary artery disease with high-grade proximal to mid obtuse   marginal branch stenosis and high-grade mid right coronary artery stenosis.  2.  Successful PTCA/stent placement of the proximal to mid obtuse marginal   branch.  3.  Normal left ventricular systolic function with ejection fraction of 65%.  4.  Elevated left ventricular end diastolic pressure.    Staged PCI 3/14/21:  POSTPROCEDURE DIAGNOSES:  1.  High-grade mid right coronary artery stenosis associated with thrombus.  2.  Successful percutaneous transluminal coronary angioplasty/stent placement   of the mid right coronary artery with 3.5x38 mm Goodman drug-eluting stent.      Assessment and Medical Decision Makin  NSTEMI  -   2.5 x 20 mm Tramaine AGATHA to the mid OM2 3/13/21, staged PCI with 3.5 x 38 mm Goodman AGATHA to the mid RCA 3/14/21.   -   Continue uninterrupted DAPT x 1 year, ASA for life.   -   Refills of Brilinta sent to [facility].  Will have SW check pricing.  -   Continue BB and high intensity statin.  -   ICR recommended.  -   Post  procedure instructions reviewed with patient.    2  Dyslipidemia   -   Continue Crestor 40  -   Goal LDL < 70     3  Essential hypertension   -   BP a little elevated this AM, consider increasing BB at follow up.    4  Acute on CKD  -   BMP in 1 week.  -   Order given to pt to have done in [place]      Cardiology will sign off at this time.  Please contact our service directly with further questions/concerns.    Future Appointments   Date Time Provider Department Center   4/5/2021 10:15 AM [facility] None       Lima Randhawa PA-C  [facility]

## 2023-12-14 ENCOUNTER — HOSPITAL ENCOUNTER (OUTPATIENT)
Dept: RADIOLOGY | Facility: MEDICAL CENTER | Age: 83
End: 2023-12-14
Attending: INTERNAL MEDICINE
Payer: MEDICARE

## 2023-12-14 DIAGNOSIS — C61 MALIGNANT NEOPLASM OF PROSTATE (HCC): ICD-10-CM

## 2023-12-14 PROCEDURE — A9595 CT-PETCT-PROSTATE SCAN SKULL BASE TO MID-THIGH (PYLARIFY OR AXUMIN): HCPCS

## 2025-06-02 ENCOUNTER — APPOINTMENT (OUTPATIENT)
Dept: RADIOLOGY | Facility: MEDICAL CENTER | Age: 85
DRG: 066 | End: 2025-06-02
Attending: EMERGENCY MEDICINE
Payer: MEDICARE

## 2025-06-02 ENCOUNTER — HOSPITAL ENCOUNTER (INPATIENT)
Facility: MEDICAL CENTER | Age: 85
LOS: 5 days | DRG: 066 | End: 2025-06-07
Attending: EMERGENCY MEDICINE | Admitting: STUDENT IN AN ORGANIZED HEALTH CARE EDUCATION/TRAINING PROGRAM
Payer: MEDICARE

## 2025-06-02 ENCOUNTER — APPOINTMENT (OUTPATIENT)
Dept: RADIOLOGY | Facility: MEDICAL CENTER | Age: 85
DRG: 066 | End: 2025-06-02
Attending: INTERNAL MEDICINE
Payer: MEDICARE

## 2025-06-02 ENCOUNTER — HOSPITAL ENCOUNTER (OUTPATIENT)
Dept: RADIOLOGY | Facility: MEDICAL CENTER | Age: 85
End: 2025-06-02

## 2025-06-02 DIAGNOSIS — I63.9 ACUTE ISCHEMIC STROKE (HCC): ICD-10-CM

## 2025-06-02 DIAGNOSIS — I63.9 CEREBROVASCULAR ACCIDENT (CVA), UNSPECIFIED MECHANISM (HCC): ICD-10-CM

## 2025-06-02 DIAGNOSIS — I63.549: Primary | ICD-10-CM

## 2025-06-02 PROBLEM — I16.0 HYPERTENSIVE URGENCY: Status: ACTIVE | Noted: 2025-06-02

## 2025-06-02 PROBLEM — C61 PROSTATE CANCER (HCC): Status: ACTIVE | Noted: 2025-06-02

## 2025-06-02 LAB
ALBUMIN SERPL BCP-MCNC: 4.3 G/DL (ref 3.2–4.9)
ALBUMIN/GLOB SERPL: 1.4 G/DL
ALP SERPL-CCNC: 66 U/L (ref 30–99)
ALT SERPL-CCNC: 12 U/L (ref 2–50)
ANION GAP SERPL CALC-SCNC: 13 MMOL/L (ref 7–16)
APTT PPP: 25.3 SEC (ref 24.7–36)
AST SERPL-CCNC: 19 U/L (ref 12–45)
BASOPHILS # BLD AUTO: 1.1 % (ref 0–1.8)
BASOPHILS # BLD: 0.06 K/UL (ref 0–0.12)
BILIRUB SERPL-MCNC: 0.6 MG/DL (ref 0.1–1.5)
BUN SERPL-MCNC: 34 MG/DL (ref 8–22)
CALCIUM ALBUM COR SERPL-MCNC: 9.8 MG/DL (ref 8.5–10.5)
CALCIUM SERPL-MCNC: 10 MG/DL (ref 8.5–10.5)
CHLORIDE SERPL-SCNC: 104 MMOL/L (ref 96–112)
CHOLEST SERPL-MCNC: 184 MG/DL (ref 100–199)
CO2 SERPL-SCNC: 23 MMOL/L (ref 20–33)
CREAT SERPL-MCNC: 1.51 MG/DL (ref 0.5–1.4)
EOSINOPHIL # BLD AUTO: 0.3 K/UL (ref 0–0.51)
EOSINOPHIL NFR BLD: 5.4 % (ref 0–6.9)
ERYTHROCYTE [DISTWIDTH] IN BLOOD BY AUTOMATED COUNT: 39.9 FL (ref 35.9–50)
EST. AVERAGE GLUCOSE BLD GHB EST-MCNC: 126 MG/DL
GFR SERPLBLD CREATININE-BSD FMLA CKD-EPI: 45 ML/MIN/1.73 M 2
GLOBULIN SER CALC-MCNC: 3 G/DL (ref 1.9–3.5)
GLUCOSE SERPL-MCNC: 88 MG/DL (ref 65–99)
HBA1C MFR BLD: 6 % (ref 4–5.6)
HCT VFR BLD AUTO: 46.8 % (ref 42–52)
HDLC SERPL-MCNC: 40 MG/DL
HGB BLD-MCNC: 15.9 G/DL (ref 14–18)
IMM GRANULOCYTES # BLD AUTO: 0.02 K/UL (ref 0–0.11)
IMM GRANULOCYTES NFR BLD AUTO: 0.4 % (ref 0–0.9)
INR PPP: 1.08 (ref 0.87–1.13)
LDLC SERPL CALC-MCNC: 102 MG/DL
LYMPHOCYTES # BLD AUTO: 0.98 K/UL (ref 1–4.8)
LYMPHOCYTES NFR BLD: 17.7 % (ref 22–41)
MCH RBC QN AUTO: 28.4 PG (ref 27–33)
MCHC RBC AUTO-ENTMCNC: 34 G/DL (ref 32.3–36.5)
MCV RBC AUTO: 83.7 FL (ref 81.4–97.8)
MONOCYTES # BLD AUTO: 0.46 K/UL (ref 0–0.85)
MONOCYTES NFR BLD AUTO: 8.3 % (ref 0–13.4)
NEUTROPHILS # BLD AUTO: 3.71 K/UL (ref 1.82–7.42)
NEUTROPHILS NFR BLD: 67.1 % (ref 44–72)
NRBC # BLD AUTO: 0 K/UL
NRBC BLD-RTO: 0 /100 WBC (ref 0–0.2)
PLATELET # BLD AUTO: 203 K/UL (ref 164–446)
PMV BLD AUTO: 10.2 FL (ref 9–12.9)
POTASSIUM SERPL-SCNC: 4.1 MMOL/L (ref 3.6–5.5)
PROT SERPL-MCNC: 7.3 G/DL (ref 6–8.2)
PROTHROMBIN TIME: 14.1 SEC (ref 12–14.6)
RBC # BLD AUTO: 5.59 M/UL (ref 4.7–6.1)
SODIUM SERPL-SCNC: 140 MMOL/L (ref 135–145)
TRIGL SERPL-MCNC: 209 MG/DL (ref 0–149)
TROPONIN T SERPL-MCNC: 19 NG/L (ref 6–19)
WBC # BLD AUTO: 5.5 K/UL (ref 4.8–10.8)

## 2025-06-02 PROCEDURE — 99291 CRITICAL CARE FIRST HOUR: CPT

## 2025-06-02 PROCEDURE — 770022 HCHG ROOM/CARE - ICU (200)

## 2025-06-02 PROCEDURE — 70498 CT ANGIOGRAPHY NECK: CPT

## 2025-06-02 PROCEDURE — 85025 COMPLETE CBC W/AUTO DIFF WBC: CPT

## 2025-06-02 PROCEDURE — 84484 ASSAY OF TROPONIN QUANT: CPT

## 2025-06-02 PROCEDURE — 85730 THROMBOPLASTIN TIME PARTIAL: CPT

## 2025-06-02 PROCEDURE — 0042T CT-CEREBRAL PERFUSION ANALYSIS: CPT

## 2025-06-02 PROCEDURE — 70496 CT ANGIOGRAPHY HEAD: CPT

## 2025-06-02 PROCEDURE — 99223 1ST HOSP IP/OBS HIGH 75: CPT | Performed by: INTERNAL MEDICINE

## 2025-06-02 PROCEDURE — 94760 N-INVAS EAR/PLS OXIMETRY 1: CPT

## 2025-06-02 PROCEDURE — 93005 ELECTROCARDIOGRAM TRACING: CPT | Mod: TC | Performed by: EMERGENCY MEDICINE

## 2025-06-02 PROCEDURE — A9270 NON-COVERED ITEM OR SERVICE: HCPCS | Performed by: STUDENT IN AN ORGANIZED HEALTH CARE EDUCATION/TRAINING PROGRAM

## 2025-06-02 PROCEDURE — 80053 COMPREHEN METABOLIC PANEL: CPT

## 2025-06-02 PROCEDURE — 700111 HCHG RX REV CODE 636 W/ 250 OVERRIDE (IP): Mod: JZ | Performed by: STUDENT IN AN ORGANIZED HEALTH CARE EDUCATION/TRAINING PROGRAM

## 2025-06-02 PROCEDURE — 83036 HEMOGLOBIN GLYCOSYLATED A1C: CPT

## 2025-06-02 PROCEDURE — 99223 1ST HOSP IP/OBS HIGH 75: CPT | Performed by: STUDENT IN AN ORGANIZED HEALTH CARE EDUCATION/TRAINING PROGRAM

## 2025-06-02 PROCEDURE — 80061 LIPID PANEL: CPT

## 2025-06-02 PROCEDURE — 700117 HCHG RX CONTRAST REV CODE 255: Performed by: EMERGENCY MEDICINE

## 2025-06-02 PROCEDURE — 36415 COLL VENOUS BLD VENIPUNCTURE: CPT

## 2025-06-02 PROCEDURE — 85610 PROTHROMBIN TIME: CPT

## 2025-06-02 PROCEDURE — 700102 HCHG RX REV CODE 250 W/ 637 OVERRIDE(OP): Performed by: STUDENT IN AN ORGANIZED HEALTH CARE EDUCATION/TRAINING PROGRAM

## 2025-06-02 PROCEDURE — 99223 1ST HOSP IP/OBS HIGH 75: CPT | Performed by: PSYCHIATRY & NEUROLOGY

## 2025-06-02 RX ORDER — ASPIRIN 81 MG/1
81 TABLET ORAL DAILY
COMMUNITY

## 2025-06-02 RX ORDER — ONDANSETRON 2 MG/ML
4 INJECTION INTRAMUSCULAR; INTRAVENOUS EVERY 4 HOURS PRN
Status: DISCONTINUED | OUTPATIENT
Start: 2025-06-02 | End: 2025-06-07 | Stop reason: HOSPADM

## 2025-06-02 RX ORDER — LOSARTAN POTASSIUM 50 MG/1
100 TABLET ORAL DAILY
Status: DISCONTINUED | OUTPATIENT
Start: 2025-06-03 | End: 2025-06-02

## 2025-06-02 RX ORDER — ACETAMINOPHEN 325 MG/1
650 TABLET ORAL EVERY 6 HOURS PRN
Status: DISCONTINUED | OUTPATIENT
Start: 2025-06-02 | End: 2025-06-07 | Stop reason: HOSPADM

## 2025-06-02 RX ORDER — PRASUGREL 10 MG/1
10 TABLET, FILM COATED ORAL DAILY
Status: DISCONTINUED | OUTPATIENT
Start: 2025-06-02 | End: 2025-06-03

## 2025-06-02 RX ORDER — IBUPROFEN 200 MG
950 CAPSULE ORAL DAILY
COMMUNITY

## 2025-06-02 RX ORDER — ASPIRIN 81 MG/1
81 TABLET ORAL DAILY
Status: DISCONTINUED | OUTPATIENT
Start: 2025-06-03 | End: 2025-06-06

## 2025-06-02 RX ORDER — ONDANSETRON 4 MG/1
4 TABLET, ORALLY DISINTEGRATING ORAL EVERY 4 HOURS PRN
Status: DISCONTINUED | OUTPATIENT
Start: 2025-06-02 | End: 2025-06-07 | Stop reason: HOSPADM

## 2025-06-02 RX ORDER — ATORVASTATIN CALCIUM 40 MG/1
40 TABLET, FILM COATED ORAL EVERY EVENING
Status: DISCONTINUED | OUTPATIENT
Start: 2025-06-02 | End: 2025-06-02

## 2025-06-02 RX ORDER — HYDRALAZINE HYDROCHLORIDE 20 MG/ML
10 INJECTION INTRAMUSCULAR; INTRAVENOUS EVERY 4 HOURS PRN
Status: DISCONTINUED | OUTPATIENT
Start: 2025-06-02 | End: 2025-06-02

## 2025-06-02 RX ORDER — POLYETHYLENE GLYCOL 3350 17 G/17G
1 POWDER, FOR SOLUTION ORAL
Status: DISCONTINUED | OUTPATIENT
Start: 2025-06-02 | End: 2025-06-07 | Stop reason: HOSPADM

## 2025-06-02 RX ORDER — ROSUVASTATIN CALCIUM 20 MG/1
40 TABLET, COATED ORAL EVERY EVENING
Status: DISCONTINUED | OUTPATIENT
Start: 2025-06-02 | End: 2025-06-07 | Stop reason: HOSPADM

## 2025-06-02 RX ORDER — MULTIVITAMIN WITH IRON
500 TABLET ORAL DAILY
COMMUNITY

## 2025-06-02 RX ORDER — LOSARTAN POTASSIUM 100 MG/1
100 TABLET ORAL DAILY
Status: ON HOLD | COMMUNITY
End: 2025-06-07

## 2025-06-02 RX ORDER — MULTIVIT WITH MINERALS/LUTEIN
1000 TABLET ORAL DAILY
COMMUNITY

## 2025-06-02 RX ORDER — ENZALUTAMIDE 40 MG/1
160 TABLET ORAL EVERY EVENING
COMMUNITY

## 2025-06-02 RX ORDER — ATORVASTATIN CALCIUM 40 MG/1
40 TABLET, FILM COATED ORAL NIGHTLY
Status: DISCONTINUED | OUTPATIENT
Start: 2025-06-02 | End: 2025-06-02

## 2025-06-02 RX ORDER — ENOXAPARIN SODIUM 100 MG/ML
40 INJECTION SUBCUTANEOUS DAILY
Status: DISCONTINUED | OUTPATIENT
Start: 2025-06-02 | End: 2025-06-07 | Stop reason: HOSPADM

## 2025-06-02 RX ORDER — LOSARTAN POTASSIUM AND HYDROCHLOROTHIAZIDE 12.5; 5 MG/1; MG/1
1 TABLET ORAL DAILY
Status: DISCONTINUED | OUTPATIENT
Start: 2025-06-03 | End: 2025-06-02

## 2025-06-02 RX ORDER — AMOXICILLIN 250 MG
2 CAPSULE ORAL EVERY EVENING
Status: DISCONTINUED | OUTPATIENT
Start: 2025-06-02 | End: 2025-06-07 | Stop reason: HOSPADM

## 2025-06-02 RX ADMIN — ROSUVASTATIN CALCIUM 40 MG: 20 TABLET, FILM COATED ORAL at 23:02

## 2025-06-02 RX ADMIN — IOHEXOL 70 ML: 350 INJECTION, SOLUTION INTRAVENOUS at 20:13

## 2025-06-02 RX ADMIN — ENOXAPARIN SODIUM 40 MG: 100 INJECTION SUBCUTANEOUS at 23:02

## 2025-06-02 RX ADMIN — PRASUGREL 10 MG: 10 TABLET, FILM COATED ORAL at 23:16

## 2025-06-02 RX ADMIN — IOHEXOL 40 ML: 350 INJECTION, SOLUTION INTRAVENOUS at 20:09

## 2025-06-02 ASSESSMENT — ENCOUNTER SYMPTOMS
ORTHOPNEA: 0
ABDOMINAL PAIN: 0
SENSORY CHANGE: 1
DIZZINESS: 0
FOCAL WEAKNESS: 0
LOSS OF CONSCIOUSNESS: 0
DOUBLE VISION: 0
SHORTNESS OF BREATH: 0
TINGLING: 0
CHILLS: 0
SPEECH CHANGE: 0
HEMOPTYSIS: 0
HEADACHES: 0
WEAKNESS: 1
EYE PAIN: 0
PHOTOPHOBIA: 0
FEVER: 0
NERVOUS/ANXIOUS: 0
HALLUCINATIONS: 0
NECK PAIN: 0
EYE DISCHARGE: 0
INSOMNIA: 0
PALPITATIONS: 0
SEIZURES: 0
TREMORS: 0
BACK PAIN: 0
VOMITING: 0
SPUTUM PRODUCTION: 0
DIARRHEA: 0
NAUSEA: 0

## 2025-06-02 ASSESSMENT — PAIN DESCRIPTION - PAIN TYPE: TYPE: ACUTE PAIN

## 2025-06-02 ASSESSMENT — LIFESTYLE VARIABLES: SUBSTANCE_ABUSE: 0

## 2025-06-02 ASSESSMENT — FIBROSIS 4 INDEX: FIB4 SCORE: 2.3

## 2025-06-03 ENCOUNTER — APPOINTMENT (OUTPATIENT)
Dept: CARDIOLOGY | Facility: MEDICAL CENTER | Age: 85
DRG: 066 | End: 2025-06-03
Attending: INTERNAL MEDICINE
Payer: MEDICARE

## 2025-06-03 LAB
ALBUMIN SERPL BCP-MCNC: 3.9 G/DL (ref 3.2–4.9)
ALBUMIN/GLOB SERPL: 1.4 G/DL
ALP SERPL-CCNC: 62 U/L (ref 30–99)
ALT SERPL-CCNC: 11 U/L (ref 2–50)
ANION GAP SERPL CALC-SCNC: 15 MMOL/L (ref 7–16)
AST SERPL-CCNC: 16 U/L (ref 12–45)
BILIRUB SERPL-MCNC: 0.4 MG/DL (ref 0.1–1.5)
BUN SERPL-MCNC: 35 MG/DL (ref 8–22)
CALCIUM ALBUM COR SERPL-MCNC: 9.3 MG/DL (ref 8.5–10.5)
CALCIUM SERPL-MCNC: 9.2 MG/DL (ref 8.5–10.5)
CHLORIDE SERPL-SCNC: 106 MMOL/L (ref 96–112)
CHOLEST SERPL-MCNC: 169 MG/DL (ref 100–199)
CO2 SERPL-SCNC: 20 MMOL/L (ref 20–33)
CREAT SERPL-MCNC: 1.71 MG/DL (ref 0.5–1.4)
EKG IMPRESSION: NORMAL
ERYTHROCYTE [DISTWIDTH] IN BLOOD BY AUTOMATED COUNT: 40.3 FL (ref 35.9–50)
GFR SERPLBLD CREATININE-BSD FMLA CKD-EPI: 39 ML/MIN/1.73 M 2
GLOBULIN SER CALC-MCNC: 2.8 G/DL (ref 1.9–3.5)
GLUCOSE SERPL-MCNC: 106 MG/DL (ref 65–99)
HCT VFR BLD AUTO: 45.2 % (ref 42–52)
HDLC SERPL-MCNC: 36 MG/DL
HGB BLD-MCNC: 15.1 G/DL (ref 14–18)
LDLC SERPL CALC-MCNC: 95 MG/DL
LV EJECT FRACT  99904: 61
LV EJECT FRACT MOD 2C 99903: 58.41
LV EJECT FRACT MOD 4C 99902: 61.38
LV EJECT FRACT MOD BP 99901: 60.75
MAGNESIUM SERPL-MCNC: 2 MG/DL (ref 1.5–2.5)
MCH RBC QN AUTO: 28.3 PG (ref 27–33)
MCHC RBC AUTO-ENTMCNC: 33.4 G/DL (ref 32.3–36.5)
MCV RBC AUTO: 84.6 FL (ref 81.4–97.8)
PHOSPHATE SERPL-MCNC: 3.7 MG/DL (ref 2.5–4.5)
PLATELET # BLD AUTO: 199 K/UL (ref 164–446)
PMV BLD AUTO: 10 FL (ref 9–12.9)
POTASSIUM SERPL-SCNC: 4 MMOL/L (ref 3.6–5.5)
PROT SERPL-MCNC: 6.7 G/DL (ref 6–8.2)
RBC # BLD AUTO: 5.34 M/UL (ref 4.7–6.1)
SODIUM SERPL-SCNC: 141 MMOL/L (ref 135–145)
TRIGL SERPL-MCNC: 192 MG/DL (ref 0–149)
TSH SERPL DL<=0.005 MIU/L-ACNC: 4.63 UIU/ML (ref 0.38–5.33)
WBC # BLD AUTO: 6.1 K/UL (ref 4.8–10.8)

## 2025-06-03 PROCEDURE — 92523 SPEECH SOUND LANG COMPREHEN: CPT

## 2025-06-03 PROCEDURE — 92610 EVALUATE SWALLOWING FUNCTION: CPT

## 2025-06-03 PROCEDURE — 84100 ASSAY OF PHOSPHORUS: CPT

## 2025-06-03 PROCEDURE — 700102 HCHG RX REV CODE 250 W/ 637 OVERRIDE(OP): Performed by: STUDENT IN AN ORGANIZED HEALTH CARE EDUCATION/TRAINING PROGRAM

## 2025-06-03 PROCEDURE — 93306 TTE W/DOPPLER COMPLETE: CPT

## 2025-06-03 PROCEDURE — 99232 SBSQ HOSP IP/OBS MODERATE 35: CPT | Performed by: PSYCHIATRY & NEUROLOGY

## 2025-06-03 PROCEDURE — 99291 CRITICAL CARE FIRST HOUR: CPT

## 2025-06-03 PROCEDURE — 97166 OT EVAL MOD COMPLEX 45 MIN: CPT

## 2025-06-03 PROCEDURE — A9270 NON-COVERED ITEM OR SERVICE: HCPCS | Performed by: STUDENT IN AN ORGANIZED HEALTH CARE EDUCATION/TRAINING PROGRAM

## 2025-06-03 PROCEDURE — 85027 COMPLETE CBC AUTOMATED: CPT

## 2025-06-03 PROCEDURE — 83735 ASSAY OF MAGNESIUM: CPT

## 2025-06-03 PROCEDURE — 84443 ASSAY THYROID STIM HORMONE: CPT

## 2025-06-03 PROCEDURE — 770022 HCHG ROOM/CARE - ICU (200)

## 2025-06-03 PROCEDURE — 700111 HCHG RX REV CODE 636 W/ 250 OVERRIDE (IP): Mod: JZ | Performed by: STUDENT IN AN ORGANIZED HEALTH CARE EDUCATION/TRAINING PROGRAM

## 2025-06-03 PROCEDURE — 97163 PT EVAL HIGH COMPLEX 45 MIN: CPT

## 2025-06-03 PROCEDURE — 93010 ELECTROCARDIOGRAM REPORT: CPT | Performed by: INTERNAL MEDICINE

## 2025-06-03 PROCEDURE — 80061 LIPID PANEL: CPT

## 2025-06-03 PROCEDURE — 93306 TTE W/DOPPLER COMPLETE: CPT | Mod: 26 | Performed by: INTERNAL MEDICINE

## 2025-06-03 PROCEDURE — 700105 HCHG RX REV CODE 258

## 2025-06-03 PROCEDURE — 700102 HCHG RX REV CODE 250 W/ 637 OVERRIDE(OP)

## 2025-06-03 PROCEDURE — A9270 NON-COVERED ITEM OR SERVICE: HCPCS

## 2025-06-03 PROCEDURE — 700111 HCHG RX REV CODE 636 W/ 250 OVERRIDE (IP)

## 2025-06-03 PROCEDURE — 70551 MRI BRAIN STEM W/O DYE: CPT

## 2025-06-03 PROCEDURE — 80053 COMPREHEN METABOLIC PANEL: CPT

## 2025-06-03 RX ORDER — HYDRALAZINE HYDROCHLORIDE 20 MG/ML
10-20 INJECTION INTRAMUSCULAR; INTRAVENOUS EVERY 4 HOURS PRN
Status: DISCONTINUED | OUTPATIENT
Start: 2025-06-03 | End: 2025-06-04

## 2025-06-03 RX ORDER — LABETALOL HYDROCHLORIDE 5 MG/ML
10-20 INJECTION, SOLUTION INTRAVENOUS EVERY 4 HOURS PRN
Status: DISCONTINUED | OUTPATIENT
Start: 2025-06-03 | End: 2025-06-04

## 2025-06-03 RX ORDER — LOSARTAN POTASSIUM 50 MG/1
100 TABLET ORAL
Status: DISCONTINUED | OUTPATIENT
Start: 2025-06-03 | End: 2025-06-06

## 2025-06-03 RX ADMIN — ASPIRIN 81 MG: 81 TABLET, COATED ORAL at 05:54

## 2025-06-03 RX ADMIN — ROSUVASTATIN CALCIUM 40 MG: 20 TABLET, FILM COATED ORAL at 17:14

## 2025-06-03 RX ADMIN — ENOXAPARIN SODIUM 40 MG: 100 INJECTION SUBCUTANEOUS at 17:14

## 2025-06-03 RX ADMIN — HYDRALAZINE HYDROCHLORIDE 10 MG: 20 INJECTION INTRAMUSCULAR; INTRAVENOUS at 12:52

## 2025-06-03 RX ADMIN — LOSARTAN POTASSIUM 100 MG: 50 TABLET, FILM COATED ORAL at 10:48

## 2025-06-03 RX ADMIN — PHENYLEPHRINE HYDROCHLORIDE 0.25 MCG/KG/MIN: 100 INJECTION INTRAVENOUS at 17:30

## 2025-06-03 SDOH — ECONOMIC STABILITY: TRANSPORTATION INSECURITY
IN THE PAST 12 MONTHS, HAS LACK OF RELIABLE TRANSPORTATION KEPT YOU FROM MEDICAL APPOINTMENTS, MEETINGS, WORK OR FROM GETTING THINGS NEEDED FOR DAILY LIVING?: NO

## 2025-06-03 SDOH — ECONOMIC STABILITY: TRANSPORTATION INSECURITY
IN THE PAST 12 MONTHS, HAS THE LACK OF TRANSPORTATION KEPT YOU FROM MEDICAL APPOINTMENTS OR FROM GETTING MEDICATIONS?: NO

## 2025-06-03 ASSESSMENT — PATIENT HEALTH QUESTIONNAIRE - PHQ9
1. LITTLE INTEREST OR PLEASURE IN DOING THINGS: NOT AT ALL
SUM OF ALL RESPONSES TO PHQ9 QUESTIONS 1 AND 2: 0
2. FEELING DOWN, DEPRESSED, IRRITABLE, OR HOPELESS: NOT AT ALL

## 2025-06-03 ASSESSMENT — COGNITIVE AND FUNCTIONAL STATUS - GENERAL
DAILY ACTIVITIY SCORE: 23
SUGGESTED CMS G CODE MODIFIER MOBILITY: CJ
MOBILITY SCORE: 24
SUGGESTED CMS G CODE MODIFIER DAILY ACTIVITY: CH
SUGGESTED CMS G CODE MODIFIER DAILY ACTIVITY: CI
MOBILITY SCORE: 21
DAILY ACTIVITIY SCORE: 24
WALKING IN HOSPITAL ROOM: A LITTLE
DRESSING REGULAR LOWER BODY CLOTHING: A LITTLE
STANDING UP FROM CHAIR USING ARMS: A LITTLE
SUGGESTED CMS G CODE MODIFIER MOBILITY: CH
CLIMB 3 TO 5 STEPS WITH RAILING: A LITTLE

## 2025-06-03 ASSESSMENT — GAIT ASSESSMENTS
GAIT LEVEL OF ASSIST: CONTACT GUARD ASSIST
DISTANCE (FEET): 125
ASSISTIVE DEVICE: HAND HELD ASSIST;OTHER (COMMENTS)
DEVIATION: INCREASED BASE OF SUPPORT;BRADYKINETIC

## 2025-06-03 ASSESSMENT — PAIN DESCRIPTION - PAIN TYPE
TYPE: ACUTE PAIN

## 2025-06-03 ASSESSMENT — LIFESTYLE VARIABLES
ON A TYPICAL DAY WHEN YOU DRINK ALCOHOL HOW MANY DRINKS DO YOU HAVE: 0
EVER HAD A DRINK FIRST THING IN THE MORNING TO STEADY YOUR NERVES TO GET RID OF A HANGOVER: NO
DOES PATIENT WANT TO STOP DRINKING: NO
AVERAGE NUMBER OF DAYS PER WEEK YOU HAVE A DRINK CONTAINING ALCOHOL: 0
TOTAL SCORE: 0
EVER FELT BAD OR GUILTY ABOUT YOUR DRINKING: NO
HAVE YOU EVER FELT YOU SHOULD CUT DOWN ON YOUR DRINKING: NO
HAVE PEOPLE ANNOYED YOU BY CRITICIZING YOUR DRINKING: NO
TOTAL SCORE: 0
ALCOHOL_USE: NO
HOW MANY TIMES IN THE PAST YEAR HAVE YOU HAD 5 OR MORE DRINKS IN A DAY: 0
TOTAL SCORE: 0
CONSUMPTION TOTAL: NEGATIVE

## 2025-06-03 ASSESSMENT — SOCIAL DETERMINANTS OF HEALTH (SDOH)
WITHIN THE LAST YEAR, HAVE TO BEEN RAPED OR FORCED TO HAVE ANY KIND OF SEXUAL ACTIVITY BY YOUR PARTNER OR EX-PARTNER?: NO
WITHIN THE LAST YEAR, HAVE YOU BEEN HUMILIATED OR EMOTIONALLY ABUSED IN OTHER WAYS BY YOUR PARTNER OR EX-PARTNER?: NO
WITHIN THE LAST YEAR, HAVE YOU BEEN KICKED, HIT, SLAPPED, OR OTHERWISE PHYSICALLY HURT BY YOUR PARTNER OR EX-PARTNER?: NO
IN THE PAST 12 MONTHS, HAS THE ELECTRIC, GAS, OIL, OR WATER COMPANY THREATENED TO SHUT OFF SERVICE IN YOUR HOME?: NO
WITHIN THE PAST 12 MONTHS, YOU WORRIED THAT YOUR FOOD WOULD RUN OUT BEFORE YOU GOT THE MONEY TO BUY MORE: NEVER TRUE
WITHIN THE LAST YEAR, HAVE YOU BEEN AFRAID OF YOUR PARTNER OR EX-PARTNER?: NO
WITHIN THE PAST 12 MONTHS, THE FOOD YOU BOUGHT JUST DIDN'T LAST AND YOU DIDN'T HAVE MONEY TO GET MORE: NEVER TRUE

## 2025-06-03 ASSESSMENT — ENCOUNTER SYMPTOMS
FOCAL WEAKNESS: 0
SENSORY CHANGE: 0
SPEECH CHANGE: 0

## 2025-06-03 ASSESSMENT — ACTIVITIES OF DAILY LIVING (ADL): TOILETING: INDEPENDENT

## 2025-06-03 NOTE — ED NOTES
Per discussion w/ pt, Dr. Delgado had told them he was changing room to ICU level of care, pt removed from transport and pending level of care change

## 2025-06-03 NOTE — H&P
Hospital Medicine History & Physical Note    Date of Service  6/2/2025    Primary Care Physician  Shamir Lemus M.D.    Consultants  neurology    Specialist Names: Dr. Delgado    Code Status  Full Code    Chief Complaint  Chief Complaint   Patient presents with    Possible Stroke     Pt BIB EMS from [facility]. Per report pt has had vertigo for the past 4 days, RLE/RUE decreased sensation and ended up spilling a coffee cup due to weakness in right hand. Pt reports 1200 to have had right leg weakness and ataxia. Pt comes to [facility] for further eval, pt reporting most symptoms to have resolved.     Ear Pain    Hypertension       History of Presenting Illness  Yogesh Orozco is a 85 y.o. male who presented 6/2/2025 with concerns for right lower extremity weakness and ataxia.  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  Patient states that over the last 2 days, he has been experiencing vertigo-like symptoms.  At that time, he was having some mild right upper and lower extremity sensory changes.  He states that he ended up spilling his coffee due to weakness in his right hand.  At approximately noon today, the patient began to experience right lower extremity weakness and ataxia.  Patient denies any other changes.    CT head: No acute intracranial hemorrhage or mass effect. Similar remote frontal and right cerebellar infarcts.     CTA Head and Neck: Near-complete stenosis of the left P2 PCA with reconstitution distally. The findings could be secondary to severe atherosclerotic plaque or nearly occlusive thrombus. The finding is new from 5/4/2016.  Moderate stenosis of the right MCA origin, new from 5/4/2016. Additional regions of stenosis as detailed above.     Neurology was consulted and recommended admission for stroke workup.    I discussed the plan of care with patient and  family.    Review of Systems  Review of Systems   Constitutional:  Negative for chills and fever.   Eyes:  Negative for double vision, photophobia, pain and discharge.   Respiratory:  Negative for hemoptysis, sputum production and shortness of breath.    Cardiovascular:  Negative for chest pain, palpitations and orthopnea.   Gastrointestinal:  Negative for abdominal pain, diarrhea, nausea and vomiting.   Genitourinary:  Negative for frequency, hematuria and urgency.   Musculoskeletal:  Negative for back pain and neck pain.   Neurological:  Positive for sensory change and weakness. Negative for dizziness, tingling, tremors, speech change, focal weakness, seizures, loss of consciousness and headaches.        Ataxia   Psychiatric/Behavioral:  Negative for hallucinations, substance abuse and suicidal ideas. The patient is not nervous/anxious and does not have insomnia.    All other systems reviewed and are negative.      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    Surgical History   has a past surgical history that includes knee arthroscopy and appendectomy.     Family History  family history includes Cancer in his father and mother.   Family history reviewed with patient. There is no family history that is pertinent to the chief complaint.     Social History   reports that he has never smoked. He has never used smokeless tobacco. He reports current alcohol use. He reports that he does not use drugs.    Allergies  Allergies[1]    Medications  Prior to Admission Medications   Prescriptions Last Dose Informant Patient Reported? Taking?   Ascorbic Acid (VITAMIN C) 1000 MG Tab  6/2/2025 Morning Patient, Family Member Yes Yes   Sig: Take 1,000 mg by mouth every day.   Cholecalciferol (VITAMIN D) 50 MCG (2000 UT) Cap 6/2/2025 Morning Patient, Family Member Yes Yes   Sig: Take 2,000 Units by mouth every day.   Enzalutamide (XTANDI) 40 MG Tab 6/1/2025 Evening Patient, Family Member Yes Yes   Sig: Take 160 mg by mouth every evening. 40 mg x 4 tablets = 160 mg   aspirin 81 MG EC tablet Unknown  Yes No   Sig: Take 81 mg by mouth every day.   calcium citrate (CALCITRATE) 950 (200 Ca) MG Tab 6/2/2025 Morning Patient, Family Member Yes Yes   Sig: Take 950 mg by mouth every day.   cyanocobalamin (VITAMIN B-12) 500 MCG Tab 5/29/2025 Morning Patient, Family Member Yes Yes   Sig: Take 500 mcg by mouth every day.   losartan (COZAAR) 100 MG Tab 6/2/2025 Morning Patient, Family Member Yes Yes   Sig: Take 100 mg by mouth every day.      Facility-Administered Medications: None       Physical Exam  Temp:  [36.8 °C (98.3 °F)] 36.8 °C (98.3 °F)  Pulse:  [64-68] 68  Resp:  [26-29] 26  BP: (192-212)/(108-142) 192/108  SpO2:  [95 %-96 %] 95 %  Blood Pressure : (!) 192/108   Temperature: 36.8 °C (98.3 °F)   Pulse: 68   Respiration: (!) 26   Pulse Oximetry: 95 %       Physical Exam  Constitutional:       General: He is not in acute distress.     Appearance: Normal appearance. He is normal weight. He is not ill-appearing, toxic-appearing or diaphoretic.   HENT:      Head: Normocephalic and atraumatic.      Mouth/Throat:      Mouth: Mucous membranes are moist.   Eyes:      Pupils: Pupils are equal, round, and reactive to light.   Cardiovascular:      Rate and Rhythm: Normal rate and regular rhythm.      Pulses: Normal pulses.      Heart sounds: Normal heart sounds. No murmur heard.     No friction rub. No gallop.   Pulmonary:      Effort: Pulmonary effort is normal. No respiratory distress.      Breath sounds: Normal breath sounds. No stridor. No wheezing, rhonchi or rales.   Chest:      Chest wall: No tenderness.  "  Abdominal:      General: There is no distension.      Palpations: There is no mass.      Tenderness: There is no abdominal tenderness. There is no right CVA tenderness, left CVA tenderness, guarding or rebound.      Hernia: No hernia is present.   Musculoskeletal:         General: No swelling, tenderness, deformity or signs of injury.      Right lower leg: No edema.      Left lower leg: No edema.   Skin:     General: Skin is warm and dry.      Capillary Refill: Capillary refill takes less than 2 seconds.      Coloration: Skin is not jaundiced or pale.      Findings: No bruising, erythema, lesion or rash.   Neurological:      General: No focal deficit present.      Mental Status: He is alert and oriented to person, place, and time. Mental status is at baseline.      Cranial Nerves: No cranial nerve deficit.      Sensory: No sensory deficit.      Motor: No weakness.      Coordination: Coordination normal.   Psychiatric:         Mood and Affect: Mood normal.         Laboratory:  Recent Labs     06/02/25  1424 06/02/25  1745   WBC 5.2 5.5   RBC 5.25 5.59   HEMOGLOBIN 14.8 15.9   HEMATOCRIT 43.5 46.8   MCV 82.9 83.7   MCH 28.2 28.4   MCHC 34 34.0   RDW 13.2 39.9   PLATELETCT 193 203   MPV 10.4 10.2         No results for input(s): \"ALTSGPT\", \"ASTSGOT\", \"ALKPHOSPHAT\", \"TBILIRUBIN\", \"DBILIRUBIN\", \"GAMMAGT\", \"AMYLASE\", \"LIPASE\", \"ALB\", \"PREALBUMIN\", \"GLUCOSE\" in the last 72 hours.  Recent Labs     06/02/25  1745   APTT 25.3   INR 1.08     No results for input(s): \"NTPROBNP\" in the last 72 hours.      No results for input(s): \"TROPONINT\" in the last 72 hours.    Imaging:  CT-CTA HEAD WITH & W/O-POST PROCESS    (Results Pending)   CT-CTA NECK WITH & W/O-POST PROCESSING    (Results Pending)   CT-CEREBRAL PERFUSION ANALYSIS    (Results Pending)   MR-BRAIN-W/O    (Results Pending)   EC-ECHOCARDIOGRAM COMPLETE W/O CONT    (Results Pending)       X-Ray:  I have personally reviewed the images and compared with prior images.  EKG: "  I have personally reviewed the images and compared with prior images.    Assessment/Plan:  Justification for Admission Status  I anticipate this patient will require at least two midnights for appropriate medical management, necessitating inpatient admission because acute CVA    Patient will need a Telemetry bed on EMERGENCY service .  The need is secondary to acute CVA .    * Acute CVA (cerebrovascular accident) (HCC)- (present on admission)  Assessment & Plan  Patient presented to the emergency department complaining of vertigo for the last 2 days.  He has been experiencing some right upper and lower extremity weakness and ataxia since approximately 12 PM today  CT head: No acute intracranial hemorrhage or mass effect. Similar remote frontal and right cerebellar infarcts.   CTA Head and Neck: Near-complete stenosis of the left P2 PCA with reconstitution distally. The findings could be secondary to severe atherosclerotic plaque or nearly occlusive thrombus. The finding is new from 5/4/2016.  Moderate stenosis of the right MCA origin, new from 5/4/2016. Additional regions of stenosis as detailed above.     Neurology was consulted and will evaluate the patient.  If they find it necessary, they will reach out to neuro IR  Recommended repeating imaging here at [facility]  MRI brain without contrast  PT OT SLP  Continue with aspirin and statin  Follow-up hemoglobin A1c and lipid panel  Echocardiogram       Hypertensive urgency  Assessment & Plan  Patient with systolic blood pressure in the 190s  As needed IV hydralazine ordered for systolic blood pressure greater then 180  Resume home dose antihypertensives      Benign essential hypertension- (present on admission)  Assessment & Plan  Continue losartan    CAD (coronary artery disease)- (present on admission)  Assessment & Plan  Continue with aspirin and statin  Follow-up lipid panel        VTE prophylaxis: SCDs/TEDs         [1]   Allergies  Allergen  Reactions    Clopidogrel Unspecified     Muscle weakness and fatigue     Lisinopril      Muscle problems    Atorvastatin Myalgia     Muscle problems

## 2025-06-03 NOTE — ASSESSMENT & PLAN NOTE
With local recurrence  Followed by [place] [facility], Dr. Pedersen  Hold Xtandi (enzalutamide) acutely will likely resume on discharge  DAPT per neuroteam

## 2025-06-03 NOTE — THERAPY
Physical Therapy   Initial Evaluation     Patient Name:  Yogesh Orozco  Age:  85 y.o., Sex:  male  Medical Record #:  [id number]  Today's Date: 6/3/2025     Precautions  Medical: Fall Risk    Assessment  Patient is 85 y.o. male per report pt has had vertigo for the past 4 days, RLE/RUE decreased sensation and ended up spilling a coffee cup due to weakness in right hand and LE and ataxia. Ear Pain. Pt comes to [facility] for further eval, pt reporting most symptoms to have resolved. CT angiogram of the head revealed a high grade L P2 stenosis. MRI brain-  Remote infarcts in the bilateral frontal region, right cerebellum, left occipital region.   Hx of HTN, STEMI, arthritis, PFO, polio, stroke, HLD.    Pt presents with the following Problems: Impaired Transfers, Impaired Ambulation, Impaired Balance, Impaired Coordination, Decreased Activity Tolerance, Safety Awareness Deficits / Cognition. Please see flowsheet below for information. Will continue to follow. Pt is currently below their functional baseline and anticipate that pt will benefit from home with family assistance and outpatient physical therapy upon DC from hospital. Pt will benefit from nursing to assist pt with mobility to include the following: up to chair 3x/day, ambulate to bathroom as needed, ambulate in halls, and Wrote information on patient's dry erase board and notified RN. in order to decrease hospital acquired weakness.        Plan    Physical Therapy Initial Treatment Plan   Treatment Plan : Gait Training, Neuro Re-Education / Balance, Stair Training, Therapeutic Activities, Therapeutic Exercise  Treatment Frequency: 3 Times per Week  Duration: Until Therapy Goals Met    DC Equipment Recommendations:  (to use SPC with mobility)  Discharge Recommendations: Recommend outpatient physical therapy services to address higher level deficits       Subjective    Pt motivated for out of bed and to eat breakfast.      Objective       06/03/25 0847   Time  In/Time Out   Therapy Start Time 0825   Therapy End Time 0847   Total Therapy Time 22   Initial Contact Note    Initial Contact Note Order Received and Verified, Physical Therapy Evaluation in Progress with Full Report to Follow.   Precautions   Medical Fall Risk   Vitals   Blood Pressure    (seated 177/81 HR 75, standing 144/82 , after amb while seated 151/84)   Vitals Comments pt stated min dizziness with standing but wanted to walk   Prior Living Situation   Prior Services Home-Independent   Housing / Facility 2 Story House   Steps Into Home 3   Steps In Home 12   Equipment Owned Single Point Cane  (walking stick)   Lives with - Patient's Self Care Capacity Spouse   Prior Level of Functional Mobility   Ambulation Independent   Ambulation Distance community   Assistive Devices Used None   History of Falls   History of Falls No   Cognition    Cognition / Consciousness X   Level of Consciousness Alert   Safety Awareness Impaired  (pt not wanting to use FWW for safety)   Comments pleasant, motivated   Active ROM Lower Body    Active ROM Lower Body  WDL   Strength Lower Body   Lower Body Strength  WDL   Coordination Lower Body    Coordination Lower Body  X   Heel To Shin Right Impaired   Comments very slight decreased coordination R LE   Balance Assessment   Sitting Balance (Static) Good   Sitting Balance (Dynamic) Fair +   Standing Balance (Static) Fair   Standing Balance (Dynamic) Fair -   Weight Shift Sitting Good   Weight Shift Standing Fair   Comments standing no AD or with HHA   Bed Mobility    Supine to Sit Independent   Scooting Independent   Rolling Independent   Comments HOB min elevated   Gait Analysis   Gait Level Of Assist Contact Guard Assist   Assistive Device Hand Held Assist;Other (Comments)  (wall rail)   Distance (Feet) 125   # of Times Distance was Traveled 2   Deviation Increased Base Of Support;Bradykinetic  (decreased step length, mulitple pathway deviations, no overt LOB. Pathway deivations  decreased with HHA)   # of Stairs Climbed 0  (deferred today due to decreased balance with gait)   Functional Mobility   Sit to Stand Contact Guard Assist   Bed, Chair, Wheelchair Transfer Contact Guard Assist   Mobility pt refused to try the walker, agreed to HHA   6 Clicks Assessment - How much HELP from from another person do you currently need... (If the patient hasn't done an activity recently, how much help from another person do you think he/she would need if he/she tried?)   Turning from your back to your side while in a flat bed without using bedrails? 4   Moving from lying on your back to sitting on the side of a flat bed without using bedrails? 4   Moving to and from a bed to a chair (including a wheelchair)? 4   Standing up from a chair using your arms (e.g., wheelchair, or bedside chair)? 3   Walking in hospital room? 3   Climbing 3-5 steps with a railing? 3   6 clicks Mobility Score 21   Short Term Goals    Short Term Goal # 1 Pt will amb with SPC supervised in 200ft in 6 visits to progress functional mobility   Short Term Goal # 2 Pt will ascend/descend 12 steps with rail SBA in 6 visits to access bedroom at home   Education Group   Education Provided Role of Physical Therapist;Gait Training;Use of Assistive Device   Role of Physical Therapist Patient Response Patient;Acceptance;Explanation;Verbal Demonstration   Gait Training Patient Response Patient;Acceptance;Explanation;Action Demonstration;Reinforcement Needed   Use of Assistive Device Patient Response Patient;Nonacceptance;Explanation;Acceptance;Action Demonstration;Reinforcement Needed   Physical Therapy Initial Treatment Plan    Treatment Plan  Gait Training;Neuro Re-Education / Balance;Stair Training;Therapeutic Activities;Therapeutic Exercise   Treatment Frequency 3 Times per Week   Duration Until Therapy Goals Met   Problem List    Problems Impaired Transfers;Impaired Ambulation;Impaired Balance;Impaired Coordination;Decreased Activity  Tolerance;Safety Awareness Deficits / Cognition   Anticipated Discharge Equipment and Recommendations   DC Equipment Recommendations   (to use SPC with mobility)   Discharge Recommendations Recommend outpatient physical therapy services to address higher level deficits   Interdisciplinary Plan of Care Collaboration   IDT Collaboration with  Nursing   Patient Position at End of Therapy Seated;Chair Alarm On;Call Light within Reach;Tray Table within Reach;Phone within Reach   Collaboration Comments RN updated

## 2025-06-03 NOTE — ASSESSMENT & PLAN NOTE
Presenting with right upper extremity weakness and disequilibrium  CTA with high-grade stenosis of left PCA  Ischemic penumbra on CTP  Neurology consulting, MRI confirmed an acute CVA  DAPT per neuro team  Prior statin intolerance, trial Crestor for LDL less than 70  Echo and MRI completed  PT/OT/SLP  S/p treatment with blood pressure augmentation, currently improving,

## 2025-06-03 NOTE — DISCHARGE PLANNING
Case Management Discharge Planning    Admission Date: 6/2/2025  GMLOS: 1.9  ALOS: 1    6-Clicks ADL Score: 24  6-Clicks Mobility Score: 24    Anticipated Discharge Dispo: Discharge Disposition: Discharged to home/self care (01)    DME Needed: No    Action(s) Taken:   Chart review was completed. Patient was discussed during IDT rounds.    Discharge assessment was completed (see below).     Escalations Completed: None    Medically Clear: No    Next Steps:  CM RN to follow up with medical team to discuss discharge barriers or needs.     Barriers to Discharge: Medical clearance and Pending PT Evaluation    Is the patient up for discharge tomorrow: No    Care Transition Team Assessment    CM RN met with pt at the bedside to complete discharge assessment. Pt appeared A/Ox4, answered all questions appropriately, and verbalized agreement to this assessment.      Case management role discussed; understanding of case management role verbalized.   Demographics on face sheet verified.   Please see H&P for pertinent PMH and reason for admission.   Housing: Lives with spouse Ramona in a two story house located at [address] [place] [address]. The address listed on the face sheet was verified to be correct mailing address.  IADLS/ADLS: Independent with IADLS/ADLS and ambulated with no use of DME at baseline prior to this admission.    DME: None owned/used   O2: RA at baseline   Discharge support: Spouse Ramona is primary support in the community and can provide transportation home at discharge.   PCP: Shamir Lemus M.D.   Preferred pharmacy: [facility] in [place], [place]   Insurance: Medicare and AARP   AD: None on file   Discharge planning: HH vs Home     Information Source  Orientation Level: Oriented X4  Information Given By: Patient  Who is responsible for making decisions for patient? : Patient    Readmission Evaluation  Is this a readmission?: No    Elopement Risk  Legal Hold: No  Ambulatory or Self Mobile  in Wheelchair: Yes  Disoriented: No  Psychiatric Symptoms: None  History of Wandering: No  Elopement this Admit: No  Vocalizing Wanting to Leave: No  Displays Behaviors, Body Language Wanting to Leave: No-Not at Risk for Elopement  Elopement Risk: Not at Risk for Elopement    Interdisciplinary Discharge Planning  Patient or legal guardian wants to designate a caregiver: No    Discharge Preparedness  What is your plan after discharge?: Uncertain - pending medical team collaboration, Home with help  What are your discharge supports?: Spouse  Prior Functional Level: Ambulatory, Drives Self, Independent with Activities of Daily Living, Independent with Medication Management  Difficulity with ADLs: None  Difficulity with IADLs: None    Functional Assesment  Prior Functional Level: Ambulatory, Drives Self, Independent with Activities of Daily Living, Independent with Medication Management    Finances  Financial Barriers to Discharge: No  Prescription Coverage: Yes    Vision / Hearing Impairment  Vision Impairment : Yes  Right Eye Vision: Impaired, Wears Glasses  Left Eye Vision: Impaired, Wears Glasses  Hearing Impairment : Yes  Hearing Impairment: Both Ears, Hearing Device(s) Available  Does Pt Need Special Equipment for the Hearing Impaired?: No    Advance Directive  Advance Directive?: None  Advance Directive offered?: AD Booklet refused    Domestic Abuse  Have you ever been the victim of abuse or violence?: No    Psychological Assessment  History of Substance Abuse: None  History of Psychiatric Problems: No  Non-compliant with Treatment: No  Newly Diagnosed Illness: No    Discharge Risks or Barriers  Discharge risks or barriers?: Complex medical needs  Patient risk factors: Complex medical needs    Anticipated Discharge Information  Discharge Disposition: Discharged to home/self care (01)  Discharge Address: [address] [place] [id number]  Discharge Contact Phone Number: [phone]

## 2025-06-03 NOTE — ASSESSMENT & PLAN NOTE
Monitor closely and titrate blood pressure medication for current goal systolic pressure between 110 and 140  Avoid hypotension secondary to intracranial vascular stenosis

## 2025-06-03 NOTE — ASSESSMENT & PLAN NOTE
Presenting with right upper extremity weakness and disequilibrium  CTA with high-grade stenosis of left PCA  Ischemic penumbra on CTP  Reviewed with vascular neurology  Admit to neuro ICU, every 2 hour neurochecks  Maintain hypertensive response, goal -140, monitor for previous symptoms  DAPT per neuro team  Prior statin intolerance, trial Crestor  Follow-up stroke labs  Echo and MRI completed  PT/OT/SLP

## 2025-06-03 NOTE — ED NOTES
Pt qualifies for basic transport according to algorithm, placed on transport list  Pending admit/transport to floor at this time

## 2025-06-03 NOTE — ED TRIAGE NOTES
Chief Complaint   Patient presents with    Possible Stroke     Pt BIB EMS from [facility] area. Per report pt has had vertigo for the past 4 days, RLE/RUE decreased sensation and ended up spilling a coffee cup due to weakness in right hand. Pt reports 1200 to have had right leg weakness and ataxia. Pt comes to [facility] for further eval, pt reporting most symptoms to have resolved.     Ear Pain    Hypertension

## 2025-06-03 NOTE — ED NOTES
Med rec updated and complete. Allergies reviewed    Confirmed name and date of birth. Interviewed pt/family at bedside.    Denies anticoagulant medications.      Denies outpatient antibiotic use in last 30 days.    Preferred Pharmacy  [facility] = [phone]

## 2025-06-03 NOTE — HOSPITAL COURSE
"\"85 y.o. male, PMH CAD, DLD, HTN, CKD, prior history of CVA without residual deficits who presented [facility]20[facility].  He reports 5 days ago he noticed trouble with balance and left ear pain that is waxed and waned for the past several days.  He is taking his blood pressure every day and has been in normal 110-120 range.  This morning when awakening he had worsening disequilibrium and hole picking up his coffee noted acute weakness in his right arm and hand.  He was having difficulty walking when his wife saw him and brought him to the local ED for evaluation.  Symptoms have since resolved.  CTA showed high-grade left P2 stenosis.  He has not had any recent infectious symptoms.  He is a non-smoker and nondrinker.  He is on baby aspirin and has a previous statin intolerance.  Currently in ED, A/O x 4, neurologic symptoms have resolved.  Current -170.\" Dr Barraza   "

## 2025-06-03 NOTE — PROGRESS NOTES
"Critical Care Progress Note    Date of admission  6/2/2025    Chief Complaint  85 y.o. male admitted 6/2/2025 with   Chief Complaint   Patient presents with    Possible Stroke     Pt BIB EMS from [facility] area. Per report pt has had vertigo for the past 4 days, RLE/RUE decreased sensation and ended up spilling a coffee cup due to weakness in right hand. Pt reports 1200 to have had right leg weakness and ataxia. Pt comes to [facility] for further eval, pt reporting most symptoms to have resolved.     Ear Pain    Hypertension        Hospital Course  \"85 y.o. male, PMH CAD, DLD, HTN, CKD, prior history of CVA without residual deficits who presented 6/2/2025 in transfer from [facility] ED.  He reports 5 days ago he noticed trouble with balance and left ear pain that is waxed and waned for the past several days.  He is taking his blood pressure every day and has been in normal 110-120 range.  This morning when awakening he had worsening disequilibrium and hole picking up his coffee noted acute weakness in his right arm and hand.  He was having difficulty walking when his wife saw him and brought him to the local ED for evaluation.  Symptoms have since resolved.  CTA showed high-grade left P2 stenosis.  He has not had any recent infectious symptoms.  He is a non-smoker and nondrinker.  He is on baby aspirin and has a previous statin intolerance.  Currently in ED, A/O x 4, neurologic symptoms have resolved.  Current -170.\" Dr Barraza     Interval Problem Update  Reviewed last 24 hour events:  - Overnight Events:  - mri, echo complete.   - Tm: AF  - Neuro: No acute neuro changes   - HR: 50-60   - SBP: 140-150, normalizing today   - RESP: 2 L NC   - UOP: 600 mL/24 hrs   - Davis: none   - GI: cardiac  - Lines: pic   - PPx: GI none, DVT lovenox   - Mobility level 1    Infusions:   -none    ABX:   -none     Review of Systems  Review of Systems   Neurological:  Negative for sensory change, speech change and focal weakness.    "     Vital Signs for last 24 hours   Temp:  [36.3 °C (97.3 °F)-36.8 °C (98.3 °F)] 36.4 °C (97.6 °F)  Pulse:  [52-78] 72  Resp:  [14-29] 18  BP: (140-212)/() 164/86  SpO2:  [90 %-96 %] 93 %    Hemodynamic parameters for last 24 hours       Respiratory Information for the last 24 hours       Physical Exam   Physical Exam  Vitals reviewed.   Constitutional:       General: He is not in acute distress.     Appearance: He is not ill-appearing.   HENT:      Head: Normocephalic.      Nose: Nose normal.   Eyes:      Pupils: Pupils are equal, round, and reactive to light.   Cardiovascular:      Rate and Rhythm: Normal rate and regular rhythm.      Pulses: Normal pulses.   Pulmonary:      Effort: Pulmonary effort is normal. No respiratory distress.      Breath sounds: No wheezing.   Abdominal:      General: Abdomen is flat. There is no distension.      Palpations: Abdomen is soft.      Tenderness: There is no abdominal tenderness.   Musculoskeletal:         General: No swelling or tenderness.      Cervical back: Neck supple.   Skin:     General: Skin is warm and dry.      Capillary Refill: Capillary refill takes less than 2 seconds.   Neurological:      General: No focal deficit present.      Mental Status: He is alert and oriented to person, place, and time.      Cranial Nerves: No cranial nerve deficit.      Motor: No weakness.   Psychiatric:         Mood and Affect: Mood normal.         Medications  Current Medications[1]    Fluids    Intake/Output Summary (Last 24 hours) at 6/3/2025 1045  Last data filed at 6/3/2025 1000  Gross per 24 hour   Intake 640 ml   Output 600 ml   Net 40 ml       Laboratory          Recent Labs     06/02/25 1745 06/03/25 0418   SODIUM 140 141   POTASSIUM 4.1 4.0   CHLORIDE 104 106   CO2 23 20   BUN 34* 35*   CREATININE 1.51* 1.71*   MAGNESIUM  --  2.0   PHOSPHORUS  --  3.7   CALCIUM 10.0 9.2     Recent Labs     06/02/25  1745 06/03/25  0418   ALTSGPT 12 11   ASTSGOT 19 16   ALKPHOSPHAT 66  62   TBILIRUBIN 0.6 0.4   GLUCOSE 88 106*     Recent Labs     06/02/25  1424 06/02/25  1745 06/03/25  0418   WBC 5.2 5.5 6.1   NEUTSPOLYS 64 67.10  --    LYMPHOCYTES 18* 17.70*  --    MONOCYTES 10 8.30  --    EOSINOPHILS 6 5.40  --    BASOPHILS 1 1.10  --    ASTSGOT  --  19 16   ALTSGPT  --  12 11   ALKPHOSPHAT  --  66 62   TBILIRUBIN  --  0.6 0.4     Recent Labs     06/02/25  1424 06/02/25  1745 06/03/25  0418   RBC 5.25 5.59 5.34   HEMOGLOBIN 14.8 15.9 15.1   HEMATOCRIT 43.5 46.8 45.2   PLATELETCT 193 203 199   PROTHROMBTM  --  14.1  --    APTT  --  25.3  --    INR  --  1.08  --        Imaging  CT:    Reviewed  Ultrasound:  Reviewed  MRI:   Reviewed    Assessment/Plan  * Acute CVA (cerebrovascular accident) (HCC)- (present on admission)  Assessment & Plan  Presenting with right upper extremity weakness and disequilibrium  CTA with high-grade stenosis of left PCA  Ischemic penumbra on CTP  Reviewed with vascular neurology  Admit to neuro ICU, every 2 hour neurochecks  Maintain hypertensive response, goal -140, monitor for previous symptoms  ASA only, no DAPT due to interactions with enzalutamide.  Prior statin intolerance, trial Crestor  Follow-up stroke labs  Echo and MRI completed  PT/OT/SLP    Prostate cancer (HCC)  Assessment & Plan  With local recurrence  Followed by [facility], Dr. Pedersen  Hold Xtandi (enzalutamide) acutely will likely resume on discharge  Of note, holding Effient due to interactions with enzalutamide    Chronic kidney disease- (present on admission)  Assessment & Plan  Based on remote labs  Unclear current baseline  Follow renal function, avoid nephrotoxins    Benign essential hypertension- (present on admission)  Assessment & Plan  Hold Cozaar, currently maintaining hypertensive BP response as above    CAD (coronary artery disease)- (present on admission)  Assessment & Plan  Continue ASA         VTE:  Lovenox  Ulcer: Not Indicated  Lines: None    I have performed a physical  exam and reviewed and updated ROS and Plan today (6/3/2025). In review of yesterday's note (6/2/2025), there are no changes except as documented above.     Discussed patient condition and risk of morbidity and/or mortality with RN, RT, Therapies, Pharmacy, Dietary, , Charge nurse / hot rounds, Patient, neurology, and  my attending Dr Mederos.   The patient remains critically ill.  Critical care time = 65 minutes in directly providing and coordinating critical care and extensive data review.  No time overlap and excludes procedures.    Please note that this dictation was created using voice recognition software. The accuracy of the dictation is limited to the abilities of the software. I have made every reasonable attempt to correct obvious errors, but I expect that there are errors of grammar and possibly content that I did not discover before finalizing the note.     JASON Salazar        [1]   Current Facility-Administered Medications   Medication Dose Route Frequency Provider Last Rate Last Admin    losartan (Cozaar) tablet 100 mg  100 mg Oral Q DAY Cory Lynn, SARAH.P.R.N.        aspirin EC tablet 81 mg  81 mg Oral DAILY Chad Keen M.D.   81 mg at 06/03/25 0554    acetaminophen (Tylenol) tablet 650 mg  650 mg Oral Q6HRS PRN Chad Keen M.D.        senna-docusate (Pericolace Or Senokot S) 8.6-50 MG per tablet 2 Tablet  2 Tablet Oral Q EVENING Chad Keen M.D.        And    polyethylene glycol/lytes (Miralax) Packet 1 Packet  1 Packet Oral QDAY PRN Chad Keen M.D.        ondansetron (Zofran) syringe/vial injection 4 mg  4 mg Intravenous Q4HRS PRN Chad Keen M.D.        Or    ondansetron (Zofran ODT) dispertab 4 mg  4 mg Oral Q4HRS PRKACI Keen M.D.        enoxaparin (Lovenox) inj 40 mg  40 mg Subcutaneous DAILY AT 1800 Chad Keen M.D.   40 mg at 06/02/25 2302    rosuvastatin (Crestor) tablet 40 mg  40 mg Oral Q EVENING Chad Keen M.D.   40 mg at 06/02/25 2803     phenylephrine 40 mg/250 mL NS premix  0-5 mcg/kg/min (Ideal) Intravenous Continuous SARAH Ludwig.P.R.N.   Dose not Required at 06/02/25 2330    MD Alert...ICU Electrolyte Replacement per Pharmacy   Other PHARMACY TO DOSE Ankit Barraza M.D.

## 2025-06-03 NOTE — PROGRESS NOTES
"Neurology Progress Note  Neurohospitalist Service, [facility]    Referring Physician: Laci Mederos M.D.      Interval History: No acute events overnight.  R hand paresthesias resulting in  weakness recurred this AM.  MRI brain completed and is negative for acute infarct.    Review of systems: In addition to what is detailed in the HPI and/or updated in the interval history, all other systems reviewed and are negative.    Past Medical History, Past Surgical History and Social History reviewed and unchanged from prior    Medications:  Current Medications[1]    Physical Examination:   BP (!) 164/86   Pulse 72   Temp 36.4 °C (97.6 °F)   Resp 18   Ht 1.778 m (5' 10\")   Wt 90.4 kg (199 lb 4.7 oz)   SpO2 93%   BMI 28.60 kg/m²       General: Patient is awake and in no acute distress  Neck: There is normal range of motion  CV: Regular rate   Extremities:  Warm, dry, and intact, without peripheral lower extremity edema    NEUROLOGICAL EXAM:     Mental status: Awake, alert and fully oriented, follows commands  Speech and language: Speech is clear and fluent. The patient is able to name and repeat.  Cranial nerve exam: Visual fields are full. There is no nystagmus. Extraocular muscles are intact. Face is symmetric.   Motor exam: There is sustained antigravity with no downward drift in bilateral arms and legs.   Sensory exam:  Reacts to tactile in all 4 extremities, there is no neglect to double stim  Coordination: No ataxia on bilateral FTN testing    Objective Data:    Labs:  Lab Results   Component Value Date/Time    PROTHROMBTM 14.1 06/02/2025 05:45 PM    INR 1.08 06/02/2025 05:45 PM      Lab Results   Component Value Date/Time    WBC 6.1 06/03/2025 04:18 AM    RBC 5.34 06/03/2025 04:18 AM    HEMOGLOBIN 15.1 06/03/2025 04:18 AM    HEMATOCRIT 45.2 06/03/2025 04:18 AM    MCV 84.6 06/03/2025 04:18 AM    MCH 28.3 06/03/2025 04:18 AM    MCHC 33.4 06/03/2025 04:18 AM    MPV 10.0 06/03/2025 " 04:18 AM    NEUTSPOLYS 67.10 06/02/2025 05:45 PM    NEUTSPOLYS 64 06/02/2025 02:24 PM    LYMPHOCYTES 17.70 (L) 06/02/2025 05:45 PM    LYMPHOCYTES 18 (L) 06/02/2025 02:24 PM    MONOCYTES 8.30 06/02/2025 05:45 PM    MONOCYTES 10 06/02/2025 02:24 PM    EOSINOPHILS 5.40 06/02/2025 05:45 PM    EOSINOPHILS 6 06/02/2025 02:24 PM    BASOPHILS 1.10 06/02/2025 05:45 PM    BASOPHILS 1 06/02/2025 02:24 PM      Lab Results   Component Value Date/Time    SODIUM 141 06/03/2025 04:18 AM    POTASSIUM 4.0 06/03/2025 04:18 AM    CHLORIDE 106 06/03/2025 04:18 AM    CO2 20 06/03/2025 04:18 AM    GLUCOSE 106 (H) 06/03/2025 04:18 AM    BUN 35 (H) 06/03/2025 04:18 AM    CREATININE 1.71 (H) 06/03/2025 04:18 AM      Lab Results   Component Value Date/Time    CHOLSTRLTOT 169 06/03/2025 04:18 AM    LDL 95 06/03/2025 04:18 AM    HDL 36 (A) 06/03/2025 04:18 AM    TRIGLYCERIDE 192 (H) 06/03/2025 04:18 AM       Lab Results   Component Value Date/Time    ALKPHOSPHAT 62 06/03/2025 04:18 AM    ASTSGOT 16 06/03/2025 04:18 AM    ALTSGPT 11 06/03/2025 04:18 AM    TBILIRUBIN 0.4 06/03/2025 04:18 AM        Imaging/Testing:    I interpreted and/or reviewed the patient's neuroimaging    EC-ECHOCARDIOGRAM COMPLETE W/O CONT   Final Result      MR-BRAIN-W/O   Final Result         No acute process. Age-related volume loss and chronic microvascular ischemic changes. 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      CT-CTA NECK WITH & W/O-POST PROCESSING   Final Result         1. Atherosclerosis with approximately 50% diameter stenosis of the proximal right internal carotid artery and less than 50% diameter stenosis of the left internal carotid.   2. Moderate stenosis of the vertebral artery origins bilaterally.      CT-CTA HEAD WITH & W/O-POST PROCESS   Final Result      1.  Atherosclerosis with moderate stenoses of the distal internal carotid arteries.   2.  High-grade stenosis of the left posterior cerebral artery.  "  3.  No large vessel occlusion or aneurysm.   4.  Atrophy, chronic white matter disease, and chronic infarcts.      CT-CEREBRAL PERFUSION ANALYSIS   Final Result      1. Cerebral blood flow less than 30% possibly representing completed infarct = 19 mL. Based on distribution of this finding, this is possibly artifact.      2. T Max more than 6 seconds possibly representing combination of completed infarct and ischemia = 162 mL. Based on the distribution of this finding, this is possibly artifact.      3. Mismatched volume possibly representing ischemic brain/penumbra= 143 mL      4.  Please note that this cerebral perfusion study and report is Quantitative and targets supratentorial (cerebral) perfusion for evaluation of large vessel territory acute ischemia/infarction. For example, lacunar infarcts, and brainstem/posterior fossa    ischemia/infarction are not evaluated on this study.  Data acquisition is subject to artifacts which can yield non-anatomically plausible perfusion maps which may be due to motion, bolus timing, signal to noise ratio, or other technical factors.    Perfusion map abnormalities which show non-anatomic distributions are likely artifact.   This study is not \"stand-alone\" and should only be utilized for diagnosis, management/treatment in correlation with CT, CTA, and/or MRI and clinical factors.             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 MRI brain overnight negative for acute infarct.  Reviewed CTA with Neuro-IR- the PCA is technically challenging for PTA/stent placement given relatively small caliber.  Given high risk intervention combined with normal MRI brain, and near normal exam, I would recommend medical management of his intracranial atherosclerosis.  Unclear if this lesion is truly symptomatic- would defer DAPT given " interaction with xtandi.  Normalize BP in ICU, and ensure exam remains stable.     Problem list:   TIA versus possible stroke   L PCA stenosis     Plan:              - continue q2h neurochecks/NIHSS              - SBP goal 110-130- if symptomatic (R side weakness, R vision changes, aphasia), increase BP by 20 points and call Stroke Neurology. May restart enteral anti-HTN.              - continue ASA 81mg daily              - will discontinue effient therapy given interactive with xtandi              - stroke labs:  HgbA1c 6.0 and LDL 95              - continue crestor 40mg daily for LDL goal < 70              - PT/OT/SLP               - may defer TTE/ziopatch              - lovenox SQ for DVT chemoppx ok    The evaluation of the patient, and recommended management, was discussed with Cory Lynn, ICU APRN. I have performed a physical exam and reviewed and updated ROS and Plan today (6/3/2025).     Juan José Delgado MD  Vascular Neurology         [1]   Current Facility-Administered Medications:     losartan (Cozaar) tablet 100 mg, 100 mg, Oral, Q DAY, Cory Lynn, A.P.R.N.    aspirin EC tablet 81 mg, 81 mg, Oral, DAILY, Chad Keen M.D., 81 mg at 06/03/25 0554    acetaminophen (Tylenol) tablet 650 mg, 650 mg, Oral, Q6HRS PRN, Chad Keen M.D.    senna-docusate (Pericolace Or Senokot S) 8.6-50 MG per tablet 2 Tablet, 2 Tablet, Oral, Q EVENING **AND** polyethylene glycol/lytes (Miralax) Packet 1 Packet, 1 Packet, Oral, QDAY PRN, Chad Keen M.D.    ondansetron (Zofran) syringe/vial injection 4 mg, 4 mg, Intravenous, Q4HRS PRN **OR** ondansetron (Zofran ODT) dispertab 4 mg, 4 mg, Oral, Q4HRS PRN, Chad Keen M.D.    enoxaparin (Lovenox) inj 40 mg, 40 mg, Subcutaneous, DAILY AT 1800, Chad Keen M.D., 40 mg at 06/02/25 2302    rosuvastatin (Crestor) tablet 40 mg, 40 mg, Oral, Q EVENING, Chad Keen M.D., 40 mg at 06/02/25 2302    phenylephrine 40 mg/250 mL NS premix, 0-5 mcg/kg/min (Ideal), Intravenous,  Continuous, HODAN Ludwig., Dose not Required at 06/02/25 2330    MD Alert...ICU Electrolyte Replacement per Pharmacy, , Other, PHARMACY TO DOSE, Ankit Barraza M.D.

## 2025-06-03 NOTE — PROGRESS NOTES
4 Eyes Skin Assessment Completed by Cadence RN and WOODROW Hartley.    Skin assessment is primarily focused on high risk bony prominences. Pay special attention to skin beneath and around medical devices, high risk bony prominences, skin to skin areas and areas where the patient lacks sensation to feel pain and areas where the patient previously had breakdown.     Head (Occipital):  WDL   Ears (Under Medical Devices): WDL   Nose (Under Medical Devices): WDL   Mouth:  WDL   Neck: WDL   Breast/Chest:  WDL   Shoulder Blades:  WDL   Spine:   WDL   (R) Arm/Elbow/Hand: Bruising   (L) Arm/Elbow/Hand: Bruising   Abdomen: WDL   Pannus/Groin:  WDL   Sacrum/Coccyx:   WDL   (R) Ischial Tuberosity (Sit Bones):  WDL   (L) Ischial Tuberosity (Sit Bones):  WDL   (R) Leg:  WDL   (L) Leg:  WDL   (R) Heel:  WDL   (R) Foot/Toe: WDL   (L) Heel: WDL   (L) Foot/Toe:  **Wound/discoloration of bony prominence (Suspected Pressure injury)**       DEVICES IN USE:   Respiratory Devices:  NA, patient on room air  Feeding Devices:  N/A   Lines & BP Monitoring Devices:  Peripheral IV    Orthopedic Devices:  N/A  Miscellaneous Devices:  Telemetry monitor and SCDs    PROTOCOL INTERVENTIONS:   ICU Low Airloss Bed:  Already in place  Float Heels with Pillows:  Already in place  Glide Sheet:  Already in place  Dri-Abner/Micro Climate Pads:  Already in place    WOUND PHOTOS:   N/A no wounds identified    WOUND CONSULT:   N/A, no advanced wound care needs identified

## 2025-06-03 NOTE — THERAPY
Occupational Therapy   Initial Evaluation     Patient Name:  Yogesh Orozco  Age:  85 y.o., Sex:  male  Medical Record #:  [id number]  Today's Date:  6/3/2025          Assessment  Patient is 85 y.o. male with a diagnosis of R side weakness, TIA vs possible CVA.  Pt currently limited by decreased functional mobility, activity tolerance, sbalance, and pain which are affecting pt's ability to complete ADLs/IADLs at baseline. Pt would benefit from OT services in the acute care setting to maximize functional recovery.      Plan    Occupational Therapy Initial Treatment Plan   Treatment Interventions: (P) Self Care / Activities of Daily Living, Therapeutic Activity  Treatment Frequency: (P) 3 Times per Week  Duration: (P) Until Therapy Goals Met       Discharge Recommendations: (P) Anticipate that the patient will have no further occupational therapy needs after discharge from the hospital        06/03/25 0973   Prior Living Situation   Prior Services Home-Independent   Housing / Facility 2 Story House   Steps Into Home 2   Equipment Owned None   Lives with - Patient's Self Care Capacity Spouse   Prior Level of ADL Function   Self Feeding Independent   Grooming / Hygiene Independent   Bathing Independent   Dressing Independent   Toileting Independent   Active ROM Upper Body   Active ROM Upper Body  WDL   Strength Upper Body   Upper Body Strength  WDL   Balance Assessment   Sitting Balance (Static) Fair +   Sitting Balance (Dynamic) Fair   Standing Balance (Static) Fair -   Standing Balance (Dynamic) Fair -   Weight Shift Sitting Good   Weight Shift Standing Fair   ADL Assessment   Grooming Supervision   Upper Body Dressing Supervision   Lower Body Dressing Minimal Assist   Functional Mobility   Sit to Stand Contact Guard Assist   Bed, Chair, Wheelchair Transfer Contact Guard Assist   Short Term Goals   Short Term Goal # 1 supervised with LB dressing   Short Term Goal # 2 supervised with ADL txfs   Occupational Therapy  Initial Treatment Plan    Treatment Interventions Self Care / Activities of Daily Living;Therapeutic Activity   Treatment Frequency 3 Times per Week   Duration Until Therapy Goals Met   Anticipated Discharge Equipment and Recommendations   Discharge Recommendations Anticipate that the patient will have no further occupational therapy needs after discharge from the hospital

## 2025-06-03 NOTE — CARE PLAN
The patient is Stable - Low risk of patient condition declining or worsening    Shift Goals  Clinical Goals: SBP goal 100-140, Q2 neuro assessments  Patient Goals: To see his wife  Family Goals: Updated on POC    Progress made toward(s) clinical / shift goals:      Problem: Knowledge Deficit - Stroke Education  Goal: Patient's knowledge of stroke and risk factors will improve  Description: Target End Date:  1-3 days or as soon as patient condition allowsDocument in Patient Education1.  Stroke education booklet provided2.  Education regarding EMS activation, need for follow up, medication prescribed at discharge, risk factors for stroke/lifestyle modifications, warning signs and symptoms of stroke provided  Outcome: Progressing     Problem: Neuro Status  Goal: Neuro status will remain stable or improve  Description: Target End Date:  Prior to discharge or change in level of careDocument on Neuro assessment in the Assessment flowsheet1.  Assess and monitor neurologic status per provider order/protocol/unit policy2.  Assess level of consciousness and orientation3.  Assess for speech, dysarthria, dysphagia, facial symmetry4.  Assess visual field, eye movements, gaze preference, pupil reaction and size5.  Assess muscle strength and motor response in all four extremities6.  Assess for sensation (numbness and tingling)7.  Assess basic neuro reflexes (cough, gag, corneal)8.  Identify changes in neuro status and report to provider for testing/treatment orders  Outcome: Progressing     Problem: Mobility - Stroke  Goal: Patient's capacity to carry out activities will improve  Description: Target End Date:  Prior to discharge or change in level of care1.  Assess for barriers to mobility/activity2.  Implement activity per interdisciplinary team recommendations3.  Target activity level identified and patient/family/caregiver aware of goal4.  Provide assistive devices5.  Instruct patient/caregiver on proper use of  assistive/adaptive devices6.  Schedule activities and rest periods to decrease effects of fatigue7.  Encourage mobilization to extent of ability8.  Maintain proper body alignment9.  Provide adequate pain management to allow progressive [id number]. Implement pace maker precautions as needed  Outcome: Progressing       Patient is not progressing towards the following goals:

## 2025-06-03 NOTE — CONSULTS
Neurology Initial Consult H&P  Neurohospitalist Service, [facility]    Referring Physician: Chad Keen M.D.    Chief Complaint   Patient presents with    Possible Stroke     Pt BIB EMS from [facility]. Per report pt has had vertigo for the past 4 days, RLE/RUE decreased sensation and ended up spilling a coffee cup due to weakness in right hand. Pt reports 1200 to have had right leg weakness and ataxia. Pt comes to [facility] for further eval, pt reporting most symptoms to have resolved.     Ear Pain    Hypertension       HPI: Yogesh Orozco is a 85 year-old man with CAD, hypertension, hyperlipidemia, history of stroke presenting as a stroke transfer from [facility] ER.  Simon reports over the past few days, he's been feeling off balance, unsteady on his feet.  This was accompanied by L ear pain, so he attributed his imbalance to this.  Today, while picking up his coffee, he experienced acute weakness and incoordination of his R hand and arm.  When his wife saw him around noon, she noted he was having difficulty walking, so took him into local ER for evaluation.  At time of ER arrival, his symptoms had resolved.  A CT angiogram of the head revealed a high grade L P2 stenosis.  Telestroke services deferred acute interventions such as thrombolytic therapy given resolution of symptoms.  He was transferred to [facility] for possible endovascular intervention such as angioplasty and/or stenting of possible symptomatic lesion.  Simon reports prior history of stroke with just residual facial paresthesias.  He is compliant on daily ASA therapy.  He had an atorvastatin intolerance in the past.  He was previously on birlinta, but it appears this was discontinued due to interaction with his anti-androgen therapy for his prostate cancer treatment.  SBPs are in 150s-180s when he is asymptomatic.  On ROS, other than ear pain, denies infectious prodrome or constitutional symptoms.     Review of systems: In  "addition to what is detailed in the HPI above, all other systems reviewed and are negative.    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    He has no past medical history of Anesthesia, Anginal syndrome (HCC), Arrhythmia, Asthma, At risk for sleep apnea, Blood clotting disorder (HCC), Breath shortness, Bronchitis, Cancer (HCC), Cataract, Cold, Congestive heart failure (HCC), Dental disorder, Emphysema of lung (HCC), Glaucoma, Heart burn, Heart murmur, Heart valve disease, Hemorrhagic disorder (HCC), Hepatitis A, Hepatitis B, Hepatitis C, Indigestion, Pacemaker, Pneumonia, Psychiatric problem, Rheumatic fever, Seizure (HCC), Sleep apnea, Snoring, or Urinary incontinence.    FHx:  family history includes Cancer in his father and mother.    SHx:   reports that he has never smoked. He has never used smokeless tobacco. He reports current alcohol use. He reports that he does not use drugs.    Allergies:  Allergies[1]    Medications:  Current Medications[2]    Physical Examination:     General: Patient is awake and in no acute distress  Eye: Examination of optic disks not indicated at this time given acuity of consult  Neck: There is normal range of motion  CV: regular rate   Extremities:  clear, dry, intact, without peripheral edema    NEUROLOGICAL EXAM:     BP (!) 179/93   Pulse 70   Temp 36.8 °C (98.3 °F) (Temporal)   Resp 17   Ht 1.778 m (5' 10\")   Wt 89.8 kg (198 lb)   SpO2 94%   BMI 28.41 kg/m²       Mental status: Awake, alert and fully oriented  Speech and language: Speech is clear and fluent. The patient is able to name and repeat, and follow " commands  Cranial nerve exam:  Visual fields are full. There is no nystagmus. Extraocular muscles are intact. Face is symmetric.  Motor exam: There is sustained antigravity with no downward drift in bilateral arms and legs.   Sensory exam: Reacts to tactile in all 4 extremities, no neglect to double stim   Coordination: No ataxia on bilateral finger-to-nose testing  Gait: Deferred due to patient preference      NIHSS: National Institutes of Health Stroke Scale    [0] 1a:Level of Consciousness    0-alert 1-drowsy   2-stupor   3-coma  [0] 1b:LOC Questions                  0-both  1-one      2-neither  [0] 1c:LOC Commands                   0-both  1-one      2-neither  [0] 2: Best Gaze                     0-nl    1-partial  2-forced  [0] 3: Visual Fields                   0-nl    1-partial  2-complete 3-bilat  [0] 4: Facial Paresis                0-nl    1-minor    2-partial  3-full  MOTOR                       0-nl  [0] 5: Right Arm           1-drift  [0] 6: Left Arm             2-some effort vs gravity  [0] 7: Right Leg           3-no effort vs gravity  [0] 8: Left Leg             4-no movement                             x-untestable  [0] 9: Limb Ataxia                    0-abs   1-1_limb   2-2+_limbs       x-untestable  [0] 10:Sensory                        0-nl    1-partial  2-dense  [0] 11:Best Language/Aphasia         0-nl    1-mild/mod 2-severe   3-mute  [0] 12:Dysarthria                     0-nl    1-mild/mod 2-severe       x-untestable  [0] 13:Neglect/Inattention            0-none  1-partial  2-complete  [0] TOTAL      Objective Data:    Labs:  Lab Results   Component Value Date/Time    PROTHROMBTM 14.1 06/02/2025 05:45 PM    INR 1.08 06/02/2025 05:45 PM      Lab Results   Component Value Date/Time    WBC 5.5 06/02/2025 05:45 PM    WBC 5.2 06/02/2025 02:24 PM    RBC 5.59 06/02/2025 05:45 PM    RBC 5.25 06/02/2025 02:24 PM    HEMOGLOBIN 15.9 06/02/2025 05:45 PM    HEMOGLOBIN 14.8 06/02/2025 02:24 PM     HEMATOCRIT 46.8 06/02/2025 05:45 PM    HEMATOCRIT 43.5 06/02/2025 02:24 PM    MCV 83.7 06/02/2025 05:45 PM    MCV 82.9 06/02/2025 02:24 PM    MCH 28.4 06/02/2025 05:45 PM    MCH 28.2 06/02/2025 02:24 PM    MCHC 34.0 06/02/2025 05:45 PM    MCHC 34 06/02/2025 02:24 PM    MPV 10.2 06/02/2025 05:45 PM    MPV 10.4 06/02/2025 02:24 PM    NEUTSPOLYS 67.10 06/02/2025 05:45 PM    NEUTSPOLYS 64 06/02/2025 02:24 PM    LYMPHOCYTES 17.70 (L) 06/02/2025 05:45 PM    LYMPHOCYTES 18 (L) 06/02/2025 02:24 PM    MONOCYTES 8.30 06/02/2025 05:45 PM    MONOCYTES 10 06/02/2025 02:24 PM    EOSINOPHILS 5.40 06/02/2025 05:45 PM    EOSINOPHILS 6 06/02/2025 02:24 PM    BASOPHILS 1.10 06/02/2025 05:45 PM    BASOPHILS 1 06/02/2025 02:24 PM      Lab Results   Component Value Date/Time    SODIUM 140 06/02/2025 05:45 PM    POTASSIUM 4.1 06/02/2025 05:45 PM    CHLORIDE 104 06/02/2025 05:45 PM    CO2 23 06/02/2025 05:45 PM    GLUCOSE 88 06/02/2025 05:45 PM    BUN 34 (H) 06/02/2025 05:45 PM    CREATININE 1.51 (H) 06/02/2025 05:45 PM      Lab Results   Component Value Date/Time    CHOLSTRLTOT 184 06/02/2025 05:45 PM     (H) 06/02/2025 05:45 PM    HDL 40 06/02/2025 05:45 PM    TRIGLYCERIDE 209 (H) 06/02/2025 05:45 PM       Lab Results   Component Value Date/Time    ALKPHOSPHAT 66 06/02/2025 05:45 PM    ASTSGOT 19 06/02/2025 05:45 PM    ALTSGPT 12 06/02/2025 05:45 PM    TBILIRUBIN 0.6 06/02/2025 05:45 PM        Imaging/Testing:    I interpreted and/or reviewed the patient's neuroimaging    CT-CTA NECK WITH & W/O-POST PROCESSING   Final Result         1. Atherosclerosis with approximately 50% diameter stenosis of the proximal right internal carotid artery and less than 50% diameter stenosis of the left internal carotid.   2. Moderate stenosis of the vertebral artery origins bilaterally.      CT-CTA HEAD WITH & W/O-POST PROCESS   Final Result      1.  Atherosclerosis with moderate stenoses of the distal internal carotid arteries.   2.  High-grade  "stenosis of the left posterior cerebral artery.   3.  No large vessel occlusion or aneurysm.   4.  Atrophy, chronic white matter disease, and chronic infarcts.      CT-CEREBRAL PERFUSION ANALYSIS   Final Result      1. Cerebral blood flow less than 30% possibly representing completed infarct = 19 mL. Based on distribution of this finding, this is possibly artifact.      2. T Max more than 6 seconds possibly representing combination of completed infarct and ischemia = 162 mL. Based on the distribution of this finding, this is possibly artifact.      3. Mismatched volume possibly representing ischemic brain/penumbra= 143 mL      4.  Please note that this cerebral perfusion study and report is Quantitative and targets supratentorial (cerebral) perfusion for evaluation of large vessel territory acute ischemia/infarction. For example, lacunar infarcts, and brainstem/posterior fossa    ischemia/infarction are not evaluated on this study.  Data acquisition is subject to artifacts which can yield non-anatomically plausible perfusion maps which may be due to motion, bolus timing, signal to noise ratio, or other technical factors.    Perfusion map abnormalities which show non-anatomic distributions are likely artifact.   This study is not \"stand-alone\" and should only be utilized for diagnosis, management/treatment in correlation with CT, CTA, and/or MRI and clinical factors.         MR-BRAIN-W/O    (Results Pending)   EC-ECHOCARDIOGRAM COMPLETE W/O CONT    (Results Pending)       Assessment and Plan:  Yogesh Orozco is a 85 year old man with multiple vascular risk factors, presenting with transient R side weakness, dysequilibrium, found to have a L P2 high grade stenosis.  Based on CT perfusion, this lesion appears flow-limiting.  Discussed with Neuro-IR, given the relatively diminutive caliber of this vessel, stenting and/or angioplasty is technically challenging, and portends a high risk for catastrophic intraoperative " complications.  Will pursue medical management for symptomatic intracranial atherosclerosis- which includes DAPT, high intensity statin, and short-term acute hypertensive response.  Given high risk for an acute stroke with transient BP lowering- recommend ICU admission for close neuromonitoring while slowly normalizing blood pressure.    Problem list:   TIA versus possible stroke   L PCA stenosis    Plan:   - admit to RICU, q2h neurochecks/NIHSS   - SBP goal 140-180- if symptomatic (R side weakness, vision changes, aphasia, increase BP by 20 points and call Stroke Neurology)   - expedite MRI brain without contrast   - continue ASA 81mg daily   - start prasugrel 10mg daily (least interaction with xtandi compared to clopidogrel or ticagrelor)   - stroke labs:  HgbA1c and lipid panel   - start crestor 40mg daily for LDL goal < 70   - PT/OT/SLP only if within BP goal   - may defer TTE/ziopatch as stroke etiology unlikely cardiomebolic   - lovenox SQ for DVT chemoppx ok    The evaluation of the patient, and recommended management, was discussed with Dr. Diaz, ER attending, and Dr. Keen, attending hospitalist.    Juan José Delgado MD  Vascular Neurology         [1]   Allergies  Allergen Reactions    Clopidogrel Unspecified     Muscle weakness and fatigue     Lisinopril      Muscle problems    Atorvastatin Myalgia     Muscle problems     [2]   Current Facility-Administered Medications:     [START ON 6/3/2025] aspirin EC tablet 81 mg, 81 mg, Oral, DAILY, Chad Keen M.D.    acetaminophen (Tylenol) tablet 650 mg, 650 mg, Oral, Q6HRS PRN, Chad Keen M.D.    senna-docusate (Pericolace Or Senokot S) 8.6-50 MG per tablet 2 Tablet, 2 Tablet, Oral, Q EVENING **AND** polyethylene glycol/lytes (Miralax) Packet 1 Packet, 1 Packet, Oral, QDAY PRN, Chad Keen M.D.    ondansetron (Zofran) syringe/vial injection 4 mg, 4 mg, Intravenous, Q4HRS PRN **OR** ondansetron (Zofran ODT) dispertab 4 mg, 4 mg, Oral, Q4HRS PRN,  Chad Keen M.D.    Enzalutamide TABS 160 mg, 160 mg, Oral, Q EVENING, Chad Keen M.D.    Current Outpatient Medications:     Enzalutamide (XTANDI) 40 MG Tab, Take 160 mg by mouth every evening. 40 mg x 4 tablets = 160 mg, Disp: , Rfl:     losartan (COZAAR) 100 MG Tab, Take 100 mg by mouth every day., Disp: , Rfl:     Ascorbic Acid (VITAMIN C) 1000 MG Tab, Take 1,000 mg by mouth every day., Disp: , Rfl:     calcium citrate (CALCITRATE) 950 (200 Ca) MG Tab, Take 950 mg by mouth every day., Disp: , Rfl:     cyanocobalamin (VITAMIN B-12) 500 MCG Tab, Take 500 mcg by mouth every day., Disp: , Rfl:     Cholecalciferol (VITAMIN D) 50 MCG (2000 UT) Cap, Take 2,000 Units by mouth every day., Disp: , Rfl:     aspirin 81 MG EC tablet, Take 81 mg by mouth every day., Disp: , Rfl:

## 2025-06-03 NOTE — ED NOTES
GABRIELA at , report given by break RN  Pt being transported upstairs in stable condition w/ chart and all belongings

## 2025-06-03 NOTE — ED NOTES
Report from Pablo TROY, pt resting at this time in NAD, VSS on RA, but BP has been elevated since arriving, pending admit  Pt was transfer from [facility] in [place] as potential stroke, weakness for four days that worsened today around noon, R sided weakness/ataxia where pt tried to  a cup and it was dropped d/t weakness  CT showed occlusion of posterior inferior cerebellar artery  S/s resolved UA here  Pt is A&O x4, pending MRI w/ screening form already completed  Pt ambulatory w/ steady gait and had already ambulated to restroom prior to shift change  Swallow eval also completed prior to shift change    Pt has hx of prostate cancer and has home meds, Enzalutamide, awaiting pharmacy verification       Call light in reach, A&O x4, fall risk precautions in place

## 2025-06-03 NOTE — THERAPY
"Speech Language Pathology   Cognitive Evaluation     Patient Name:  Yogesh Orozco  AGE:  85 y.o., SEX:  male  Medical Record #:  [id number]  Date of Service:  6/3/2025         History of Present Illness  85 y.o. male presented  as a stroke transfer from [facility]. A CT angiogram of the head revealed a high grade L P2 stenosis.     PMHx  PMH CAD, DLD, HTN, CKD, prior history of CVA, Prostate cancer    Speech Therapy:  2/24/2016 [facility] Eval \"mild dysarthria and aphasia\"  2/24/2016 [facility] rec RG7/TN0    CMHx Acute CVA, L PCA stenosis    CXR 6/2/25  Lungs: Clear of infiltrates or nodules.    MRI -Brain w/o 6/3/25  No acute process. Age-related volume loss and chronic microvascular ischemic changes. 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      General Information  Vitals  O2 Delivery Device: None - Room Air  Level of Consciousness: Alert, Awake     Orientation: Oriented x 4  Follows Directives: Yes      Prior Living Situation & Level of Function  Prior Services: Home-Independent  Housing / Facility: [address] [facility]  Lives with - Patient's Self Care Capacity: Spouse  Communication: h/o \"mild dysarthria & aphasia\" s/p CVA in 2016  Swallowing: WFL       Subjective  RN cleared patient for evaluation. Patient received awake, sitting UIC, fully cooperated with SLP tasks. Patient reports I with ADLs/IADLs at baseline.      Assessment  The patient was seen this date for a cognitive evaluation. Portions of the COGNISTAT (Neurobehavioral Cognitive Status Assessment) were administered in conjunction with non-standardized assessments. Results are outlined below:        Cognistat  Orientation: Average  Attention: Average  Comprehension: Average  Repetition: Average  Naming: Average  Memory: Average  Calculations: Average  Similarities: Average  Judgement: Average      Medication Management: Good synthesis of information and memory for medication management tasks. Answered temporal and " numerical reasoning tasks with 100% accuracy, independently.    Comments: Immediate recall of four word memory task achieved on second attempt. With delayed recall of ~5 minutes, patient recalled 3/4 words independently; provided category prompt, accuracy did not increase; provided three word list, accuracy increased; 4/4 words recalled.      Clinical Impressions  Based on formal and informal testing measures, patient presents with cognitive-linguistic skills that overall WFL and likely consistent with his baseline. Patient was alert and cooperative. Patient lives with his spouse who is able to provide assistance with IADLs at home. Acute cognitive intervention is not warranted. Recommend initial patient supervision upon returning home to ensure carryover of routine. If cognitive symptoms arise/persist after resuming baseline activities, recommend outpatient speech therapy follow up.      NOTE: It is not within the scope of practice of Speech-Language Pathologists to determine patient capacity. Please defer to the physician or psych to complete this assessment.       Recommendations  Supervision Needs Upons Discharge: Intermittent supervision throughout the day and direct assistance with IADLs (see below)  IADLs: Medication management, Financial management, Appointment management, Household chores, Cooking         SLP Treatment Plan  Treatment Plan: None Indicated  SLP Frequency: N/A - Evaluation Only  Estimated Duration: N/A - Evaluation Only      Anticipated Discharge Needs  Discharge Recommendations: Anticipate that the patient will have no further speech therapy needs after discharge from the hospital  Therapy Recommendations Upon DC: Patient / Family / Caregiver Education                Maritza Victoria, QUINTIN

## 2025-06-03 NOTE — THERAPY
"Speech Language Pathology   Clinical Swallow Evaluation     Patient Name:  Yogesh Orozco  AGE:  85 y.o., SEX:  male  Medical Record #:  [id number]  Date of Service:  6/3/2025         History of Present Illness  85 y.o. male presented  as a stroke transfer from [facility]. A CT angiogram of the head revealed a high grade L P2 stenosis.     PMHx  PMH CAD, DLD, HTN, CKD, prior history of CVA, Prostate cancer    Speech Therapy:  2/24/2016 [facility] Eval \"mild dysarthria and aphasia\"  2/24/2016 [facility] rec RG7/TN0    CMHx Acute CVA, L PCA stenosis    CXR 6/2/25  Lungs: Clear of infiltrates or nodules.    MRI -Brain w/o 6/3/25  No acute process. Age-related volume loss and chronic microvascular ischemic changes. 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      General Information:  Vitals  O2 Delivery Device: None - Room Air  Level of Consciousness: Alert, Awake     Orientation: Oriented x 4  Follows Directives: Yes      Prior Living Situation & Level of Function:  Prior Services: Home-Independent  Housing / Facility: [address] [facility]  Lives with - Patient's Self Care Capacity: Spouse     Communication: h/o \"mild dysarthria & aphasia\" s/p CVA in 2016  Swallowing: WFL       Oral Mechanism Evaluation:  Dentition: Good   Facial Symmetry: Equal  Facial Sensation: Equal     Labial Observations: WFL   Lingual Observations: Midline               Laryngeal Function:  Secretion Management: Adequate  Voice Quality: WFL        Cough: Perceptually WNL         Subjective  RN cleared patient for evaluation. Patient received awake, sitting UIC, fully cooperated with SLP tasks.      Assessment  Current Method of Nutrition: Oral diet (Cardiac)  Positioning: Sitting UIC  Bolus Administration: Patient, SLP    O2 Delivery Device: None - Room Air  Factor(s) Affecting Performance: Impaired endurance  Tracheostomy : No            Swallowing Trials:  Swallowing Trials  Thin Liquid (TN0): WFL  Liquidised (LQ3): " WFL  Regular (RG7): WFL      Comments: Patient needing A with tray set-up 2/2 RUE weakness. Adequate oral bolus acceptance/containment, complete AP transfer, and timely mastication. No cough/throat clear appreciated with PO. No signs of esophageal dysfunction. The 3 ounce water challenge is a swallow screen which if passed has a predictive rate of 96% sensitivity for identifying individuals safe to swallow (Cater et al, 2008). Patient completed without incident.         Clinical Impressions  Patient presents with clinically functional oropharyngeal swallow. Acute SLP intervention is not warranted at this time. Please re-consult with any change in status.      Recommendations  Diet Consistency: Regular solids, thin liquids  Instrumentation: None indicated at this time  Medication: As tolerated  Supervision: Assist with meal tray set up  Positioning: Fully upright and midline during oral intake, Meals sitting upright in a chair, as tolerated  Risk Management : Slow rate of intake, Physical mobility, as tolerated  Oral Care: BID         SLP Treatment Plan  Treatment Plan: None Indicated  SLP Frequency: N/A - Evaluation Only  Estimated Duration: N/A - Evaluation Only      Anticipated Discharge Needs  Discharge Recommendations: Anticipate that the patient will have no further speech therapy needs after discharge from the hospital   Therapy Recommendations Upon DC: Patient / Family / Caregiver Education                  Maritza Victoria, SLP

## 2025-06-03 NOTE — CONSULTS
Critical Care Consultation    Date of consult: 6/2/2025    Referring Physician  Chad Keen M.D.    Reason for Consultation  Right upper extremity weakness, disequilibrium, stroke evaluation    History of Presenting Illness  85 y.o. male, PMH CAD, DLD, HTN, CKD, prior history of CVA without residual deficits who presented 6/2/2025 [facility].  He reports 5 days ago he noticed trouble with balance and left ear pain that is waxed and waned for the past several days.  He is taking his blood pressure every day and has been in normal 110-120 range.  This morning when awakening he had worsening disequilibrium and hole picking up his coffee noted acute weakness in his right arm and hand.  He was having difficulty walking when his wife saw him and brought him to the local ED for evaluation.  Symptoms have since resolved.  CTA showed high-grade left P2 stenosis.  He has not had any recent infectious symptoms.  He is a non-smoker and nondrinker.  He is on baby aspirin and has a previous statin intolerance.  Currently in ED, A/O x 4, neurologic symptoms have resolved.  Current -170.    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 Nuvia Matta    Surgical History   has a past surgical history that includes knee arthroscopy and appendectomy.    Family History  family history includes Cancer in his father and mother.    Social History   reports that he has never smoked. He  has never used smokeless tobacco. He reports current alcohol use. He reports that he does not use drugs.  He is  to his third wife of 37 years and they live in [place]  He spent his career in [employer] and is currently retired  He is a nondrinker non-smoker  He is accompanied by his grandson at bedside    Medications  Home Medications       Reviewed by José Miguel Segura (Pharmacy Tech) on 06/02/25 at 1813  Med List Status: Complete     Medication Last Dose Status   Ascorbic Acid (VITAMIN C) 1000 MG Tab 6/2/2025 Active   calcium citrate (CALCITRATE) 950 (200 Ca) MG Tab 6/2/2025 Active   Cholecalciferol (VITAMIN D) 50 MCG (2000 UT) Cap 6/2/2025 Active   cyanocobalamin (VITAMIN B-12) 500 MCG Tab 5/29/2025 Active   Enzalutamide (XTANDI) 40 MG Tab 6/1/2025 Active   losartan (COZAAR) 100 MG Tab 6/2/2025 Active                  Audit from Redirected Encounters    **Home medications have not yet been reviewed for this encounter**       Current Medications[1]    Allergies  Allergies[2]    Vital Signs last 24 hours  Temp:  [36.8 °C (98.3 °F)] 36.8 °C (98.3 °F)  Pulse:  [64-70] 70  Resp:  [17-29] 17  BP: (152-212)/() 179/93  SpO2:  [92 %-96 %] 94 %    Physical Exam  Physical Exam  Vitals reviewed.   Constitutional:       General: He is not in acute distress.     Appearance: He is not ill-appearing.   HENT:      Head: Normocephalic.      Nose: Nose normal.   Eyes:      Pupils: Pupils are equal, round, and reactive to light.   Cardiovascular:      Rate and Rhythm: Normal rate and regular rhythm.      Pulses: Normal pulses.   Pulmonary:      Effort: Pulmonary effort is normal. No respiratory distress.      Breath sounds: No wheezing.   Abdominal:      General: Abdomen is flat. There is no distension.      Palpations: Abdomen is soft.      Tenderness: There is no abdominal tenderness.   Musculoskeletal:         General: No swelling or tenderness.      Cervical back: Neck supple.   Skin:     General: Skin is warm  and dry.      Capillary Refill: Capillary refill takes less than 2 seconds.   Neurological:      General: No focal deficit present.      Mental Status: He is alert and oriented to person, place, and time.      Cranial Nerves: No cranial nerve deficit.      Motor: No weakness.   Psychiatric:         Mood and Affect: Mood normal.         Fluids  No intake or output data in the 24 hours ending 06/02/25 2126    Laboratory  Recent Results (from the past 48 hours)   CBC WITH DIFFERENTIAL    Collection Time: 06/02/25  2:24 PM   Result Value Ref Range    WBC 5.2 4.0 - 10.0 K/uL    RBC 5.25 4.70 - 6.10 M/uL    Hemoglobin 14.8 14.0 - 18.0 g/dL    Hematocrit 43.5 42.0 - 54.0 %    MCV 82.9 81.0 - 100.0 fL    MCH 28.2 26.0 - 34.0 pg    MCHC 34 33.0 - 37.0 g/dL    RDW 13.2 11.5 - 14.5 %    Platelet Count 193 130 - 400 K/uL    MPV 10.4 8.5 - 12.5 fL    Neutrophils-Polys 64 42 - 75 %    Lymphocytes 18 (L) 21 - 51 %    Monocytes 10 2 - 12 %    Eosinophils 6 0 - 7 %    Basophils 1 0 - 2 %    Immature Granulocytes 1 0 - 1 %    Neutrophils (Absolute) 3.31 1.50 - 8.00 K/uL    Lymphs (Absolute) 0.95 0.70 - 4.50 K/uL    Monos (Absolute) 0.53 0.10 - 1.00 K/uL    Eos (Absolute) 0.29 0.00 - 0.40 K/uL    Baso (Absolute) 0.05 0.00 - 0.20 K/uL    Immature Granulocytes (abs) 0.03 0.00 - 2.90 K/uL   CBC WITH DIFFERENTIAL    Collection Time: 06/02/25  5:45 PM   Result Value Ref Range    WBC 5.5 4.8 - 10.8 K/uL    RBC 5.59 4.70 - 6.10 M/uL    Hemoglobin 15.9 14.0 - 18.0 g/dL    Hematocrit 46.8 42.0 - 52.0 %    MCV 83.7 81.4 - 97.8 fL    MCH 28.4 27.0 - 33.0 pg    MCHC 34.0 32.3 - 36.5 g/dL    RDW 39.9 35.9 - 50.0 fL    Platelet Count 203 164 - 446 K/uL    MPV 10.2 9.0 - 12.9 fL    Neutrophils-Polys 67.10 44.00 - 72.00 %    Lymphocytes 17.70 (L) 22.00 - 41.00 %    Monocytes 8.30 0.00 - 13.40 %    Eosinophils 5.40 0.00 - 6.90 %    Basophils 1.10 0.00 - 1.80 %    Immature Granulocytes 0.40 0.00 - 0.90 %    Nucleated RBC 0.00 0.00 - 0.20 /100 WBC     Neutrophils (Absolute) 3.71 1.82 - 7.42 K/uL    Lymphs (Absolute) 0.98 (L) 1.00 - 4.80 K/uL    Monos (Absolute) 0.46 0.00 - 0.85 K/uL    Eos (Absolute) 0.30 0.00 - 0.51 K/uL    Baso (Absolute) 0.06 0.00 - 0.12 K/uL    Immature Granulocytes (abs) 0.02 0.00 - 0.11 K/uL    NRBC (Absolute) 0.00 K/uL   COMP METABOLIC PANEL    Collection Time: 06/02/25  5:45 PM   Result Value Ref Range    Sodium 140 135 - 145 mmol/L    Potassium 4.1 3.6 - 5.5 mmol/L    Chloride 104 96 - 112 mmol/L    Co2 23 20 - 33 mmol/L    Anion Gap 13.0 7.0 - 16.0    Glucose 88 65 - 99 mg/dL    Bun 34 (H) 8 - 22 mg/dL    Creatinine 1.51 (H) 0.50 - 1.40 mg/dL    Calcium 10.0 8.5 - 10.5 mg/dL    Correct Calcium 9.8 8.5 - 10.5 mg/dL    AST(SGOT) 19 12 - 45 U/L    ALT(SGPT) 12 2 - 50 U/L    Alkaline Phosphatase 66 30 - 99 U/L    Total Bilirubin 0.6 0.1 - 1.5 mg/dL    Albumin 4.3 3.2 - 4.9 g/dL    Total Protein 7.3 6.0 - 8.2 g/dL    Globulin 3.0 1.9 - 3.5 g/dL    A-G Ratio 1.4 g/dL   TROPONIN    Collection Time: 06/02/25  5:45 PM   Result Value Ref Range    Troponin T 19 6 - 19 ng/L   APTT    Collection Time: 06/02/25  5:45 PM   Result Value Ref Range    APTT 25.3 24.7 - 36.0 sec   PROTHROMBIN TIME (INR)    Collection Time: 06/02/25  5:45 PM   Result Value Ref Range    PT 14.1 12.0 - 14.6 sec    INR 1.08 0.87 - 1.13   HEMOGLOBIN A1C    Collection Time: 06/02/25  5:45 PM   Result Value Ref Range    Glycohemoglobin 6.0 (H) 4.0 - 5.6 %    Est Avg Glucose 126 mg/dL   Lipid Profile    Collection Time: 06/02/25  5:45 PM   Result Value Ref Range    Cholesterol,Tot 184 100 - 199 mg/dL    Triglycerides 209 (H) 0 - 149 mg/dL    HDL 40 >=40 mg/dL     (H) <100 mg/dL   ESTIMATED GFR    Collection Time: 06/02/25  5:45 PM   Result Value Ref Range    GFR (CKD-EPI) 45 (A) >60 mL/min/1.73 m 2       Imaging  CT-CTA NECK WITH & W/O-POST PROCESSING   Final Result         1. Atherosclerosis with approximately 50% diameter stenosis of the proximal right internal carotid  "artery and less than 50% diameter stenosis of the left internal carotid.   2. Moderate stenosis of the vertebral artery origins bilaterally.      CT-CTA HEAD WITH & W/O-POST PROCESS   Final Result      1.  Atherosclerosis with moderate stenoses of the distal internal carotid arteries.   2.  High-grade stenosis of the left posterior cerebral artery.   3.  No large vessel occlusion or aneurysm.   4.  Atrophy, chronic white matter disease, and chronic infarcts.      CT-CEREBRAL PERFUSION ANALYSIS   Final Result      1. Cerebral blood flow less than 30% possibly representing completed infarct = 19 mL. Based on distribution of this finding, this is possibly artifact.      2. T Max more than 6 seconds possibly representing combination of completed infarct and ischemia = 162 mL. Based on the distribution of this finding, this is possibly artifact.      3. Mismatched volume possibly representing ischemic brain/penumbra= 143 mL      4.  Please note that this cerebral perfusion study and report is Quantitative and targets supratentorial (cerebral) perfusion for evaluation of large vessel territory acute ischemia/infarction. For example, lacunar infarcts, and brainstem/posterior fossa    ischemia/infarction are not evaluated on this study.  Data acquisition is subject to artifacts which can yield non-anatomically plausible perfusion maps which may be due to motion, bolus timing, signal to noise ratio, or other technical factors.    Perfusion map abnormalities which show non-anatomic distributions are likely artifact.   This study is not \"stand-alone\" and should only be utilized for diagnosis, management/treatment in correlation with CT, CTA, and/or MRI and clinical factors.         MR-BRAIN-W/O    (Results Pending)   EC-ECHOCARDIOGRAM COMPLETE W/O CONT    (Results Pending)       Assessment/Plan  * Acute CVA (cerebrovascular accident) (HCC)- (present on admission)  Assessment & Plan  Presenting with right upper extremity " weakness and disequilibrium  CTA with high-grade stenosis of left PCA  Ischemic penumbra on CTP  Reviewed with vascular neurology  Admit to neuro ICU, every 2 hour neurochecks  Maintain hypertensive response, goal -180 acutely  ASA  Prior statin intolerance, trial Crestor  Follow-up stroke labs  Echocardiogram although less likely cardioembolic  Expedited MRI brain  PT/OT/SLP      Prostate cancer (HCC)  Assessment & Plan  With local recurrence  Followed by [facility], Dr. Pedersen  Hold Xtandi (enzalutamide) acutely, discussed with oncology in a.m. (some neurologic side effects)    Chronic kidney disease- (present on admission)  Assessment & Plan  Based on remote labs  Unclear current baseline  Follow renal function, avoid nephrotoxins    Benign essential hypertension- (present on admission)  Assessment & Plan  Hold Cozaar, currently maintaining hypertensive BP response as above    CAD (coronary artery disease)- (present on admission)  Assessment & Plan  Continue ASA  Follow-up echocardiogram        Discussed patient condition and risk of morbidity and/or mortality with RN, RT, Pharmacy, and Neuro.    The patient remains critically ill.  Critical care time = 45 minutes in directly providing and coordinating critical care and extensive data review.  No time overlap and excludes procedures.             [1]   Current Facility-Administered Medications   Medication Dose Route Frequency Provider Last Rate Last Admin    [START ON 6/3/2025] aspirin EC tablet 81 mg  81 mg Oral DAILY Chad Keen M.D.        acetaminophen (Tylenol) tablet 650 mg  650 mg Oral Q6HRS PRN Chad Keen M.D.        senna-docusate (Pericolace Or Senokot S) 8.6-50 MG per tablet 2 Tablet  2 Tablet Oral Q EVENING Chad Keen M.D.        And    polyethylene glycol/lytes (Miralax) Packet 1 Packet  1 Packet Oral QDAY PRN Chad Keen M.D.        ondansetron (Zofran) syringe/vial injection 4 mg  4 mg Intravenous Q4HRS PRN Chad  CORY Keen        Or    ondansetron (Zofran ODT) dispertab 4 mg  4 mg Oral Q4HRS PRN Chad Keen M.D.        Enzalutamide TABS 160 mg  160 mg Oral Q EVENING Chad Keen M.D.         Current Outpatient Medications   Medication Sig Dispense Refill    Enzalutamide (XTANDI) 40 MG Tab Take 160 mg by mouth every evening. 40 mg x 4 tablets = 160 mg      losartan (COZAAR) 100 MG Tab Take 100 mg by mouth every day.      Ascorbic Acid (VITAMIN C) 1000 MG Tab Take 1,000 mg by mouth every day.      calcium citrate (CALCITRATE) 950 (200 Ca) MG Tab Take 950 mg by mouth every day.      cyanocobalamin (VITAMIN B-12) 500 MCG Tab Take 500 mcg by mouth every day.      Cholecalciferol (VITAMIN D) 50 MCG (2000 UT) Cap Take 2,000 Units by mouth every day.      aspirin 81 MG EC tablet Take 81 mg by mouth every day.     [2]   Allergies  Allergen Reactions    Clopidogrel Unspecified     Muscle weakness and fatigue     Lisinopril      Muscle problems    Atorvastatin Myalgia     Muscle problems

## 2025-06-03 NOTE — ED NOTES
Bedside report to [place] RN.  POC discussed with patient. Call light within reach, all needs addressed at this time.         Fall risk interventions in place: In place(all applicable per [place] Fall risk assessment)   Continuous monitoring: Cardiac Leads, Pulse Ox, or Blood Pressure  IVF/IV medications: Not Applicable   Oxygen: Room Air  Bedside sitter: Not Applicable   Isolation: Not Applicable

## 2025-06-03 NOTE — PROGRESS NOTES
Patient complaining of new R hand numbness and tingling, patients strength 5/5 bilateral upper extremities, no other abnormalities during neuro assessment. Dr. Delgado notified at bedside.

## 2025-06-03 NOTE — ED PROVIDER NOTES
ED Provider Note    Scribed for Diego Diaz by Dayana Sinclair. 6/2/2025  5:25 PM    Primary care provider: Shamir Lemus M.D.  Means of arrival: EMS  History obtained from: Patient  History limited by: None    CHIEF COMPLAINT  Chief Complaint   Patient presents with    Possible Stroke     Pt BIB EMS from [facility]. Per report pt has had vertigo for the past 4 days, RLE/RUE decreased sensation and ended up spilling a coffee cup due to weakness in right hand. Pt reports 1200 to have had right leg weakness and ataxia. Pt comes to [facility] for further eval, pt reporting most symptoms to have resolved.     Ear Pain    Hypertension     EXTERNAL RECORDS REVIEWED  Outpatient Notes Patient seen 5/9/2025 at [facility] for follow up with known history of prostate cancer, recurrent adenocarcinoma of prostate. Patient has further history of STEMI, Coronary arteriosclerosis, and stroke.    HPI/ROS  LIMITATION TO HISTORY   Select: : None  OUTSIDE HISTORIAN(S):  None    HPI  Yogesh Orozco is a 85 y.o. male who presents to the Emergency Department via EMS as a transfer from [place] for acute stroke. The patient initially presented with acute right lower extremity weakness and ataxia, intermittent vertigo symptoms including ear pain for the past week. Patient reports initially assuming he was having an ear related issue because of poor balance and ear pain, and was using ear drops with Tylenol for alleviation. The difficulty walking and weakness prompted him to come into the ED today. NIH Score of 2 upon arrival. Last known well was 12 PM, 5.5 hours ago. Tele neuro team did not recommend thrombolytics and reported imaging revealed posterior cerebellar infarct. Patient was sent here for higher level of care including MRI. In room patient reports feeling normal without any symptoms, as all prior symptoms have resolved.There are no known alleviating or exacerbating factors.      REVIEW OF  SYSTEMS  As above, all other systems reviewed and are negative.   See HPI for further details.     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  SURGICAL HISTORY   has a past surgical history that includes knee arthroscopy and appendectomy.  SOCIAL HISTORY  Social History[1]   Social History     Substance and Sexual Activity   Drug Use No     FAMILY HISTORY  Family History   Problem Relation Age of Onset    Cancer Mother         Ovarian CA    Cancer Father         Leukemia     CURRENT MEDICATIONS  Home Medications       Reviewed by José Miguel Segura (Pharmacy Tech) on 06/02/25 at 1813  Med List Status: Complete     Medication Last Dose Status   Ascorbic Acid (VITAMIN C) 1000 MG Tab 6/2/2025 Active   calcium citrate (CALCITRATE) 950 (200 Ca) MG Tab 6/2/2025 Active   Cholecalciferol (VITAMIN D) 50 MCG (2000 UT) Cap 6/2/2025 Active   cyanocobalamin (VITAMIN B-12) 500 MCG Tab 5/29/2025 Active   Enzalutamide (XTANDI) 40 MG Tab 6/1/2025 Active   losartan (COZAAR) 100 MG Tab 6/2/2025 Active                  Audit from Redirected Encounters    **Home medications have not yet been reviewed for this encounter**       ALLERGIES  Allergies[2]    PHYSICAL EXAM    VITAL SIGNS:   Vitals:    06/02/25 2027 06/02/25 2044 06/02/25 2059 06/02/25 2102   BP:    (!) 179/93   Pulse: 69 70 70    Resp: 17      Temp:       TempSrc:       SpO2: 92% 93% 94%    Weight:       Height:           Vitals: My interpretation: hypertensive, not tachycardic, afebrile, not hypoxic    Reinterpretation of vitals: Unchanged and unremarkable.      Cardiac Monitor Interpretation: The cardiac monitor revealed normal  Sinus Rhythm as interpreted by me. The cardiac monitor was ordered secondary to the patient's history and to monitor for dysrhythmia and/or tachycardia.    PE:   Gen: sitting comfortably, speaking clearly, appears in no acute distress   ENT: Mucous membranes moist, posterior pharynx clear, uvula midline, nares patent bilaterally   Neck: Supple, FROM  Pulmonary: Lungs are clear to auscultation bilaterally. No tachypnea  CV:  RRR, no murmur appreciated, pulses 2+ in both upper and lower extremities  Abdomen: soft, NT/ND; no rebound/guarding  : no CVA or suprapubic tenderness   Neuro: A&Ox4 (person, place, time, situation), speech fluent, gait steady, no focal deficits appreciated  Skin: No rash or lesions.  No pallor or jaundice.  No cyanosis.  Warm and dry.     DIAGNOSTIC STUDIES / PROCEDURES    LABS   Results for orders placed or performed during the hospital encounter of 06/02/25   CBC WITH DIFFERENTIAL    Collection Time: 06/02/25  5:45 PM   Result Value Ref Range    WBC 5.5 4.8 - 10.8 K/uL    RBC 5.59 4.70 - 6.10 M/uL    Hemoglobin 15.9 14.0 - 18.0 g/dL    Hematocrit 46.8 42.0 - 52.0 %    MCV 83.7 81.4 - 97.8 fL    MCH 28.4 27.0 - 33.0 pg    MCHC 34.0 32.3 - 36.5 g/dL    RDW 39.9 35.9 - 50.0 fL    Platelet Count 203 164 - 446 K/uL    MPV 10.2 9.0 - 12.9 fL    Neutrophils-Polys 67.10 44.00 - 72.00 %    Lymphocytes 17.70 (L) 22.00 - 41.00 %    Monocytes 8.30 0.00 - 13.40 %    Eosinophils 5.40 0.00 - 6.90 %    Basophils 1.10 0.00 - 1.80 %    Immature Granulocytes 0.40 0.00 - 0.90 %    Nucleated RBC 0.00 0.00 - 0.20 /100 WBC    Neutrophils (Absolute) 3.71 1.82 - 7.42 K/uL    Lymphs (Absolute) 0.98 (L) 1.00 - 4.80 K/uL    Monos (Absolute) 0.46 0.00 - 0.85 K/uL    Eos (Absolute) 0.30 0.00 - 0.51 K/uL    Baso (Absolute) 0.06 0.00 - 0.12 K/uL    Immature Granulocytes (abs) 0.02 0.00 - 0.11 K/uL    NRBC (Absolute) 0.00 K/uL   COMP METABOLIC PANEL    Collection Time: 06/02/25  5:45 PM   Result Value Ref Range    Sodium  140 135 - 145 mmol/L    Potassium 4.1 3.6 - 5.5 mmol/L    Chloride 104 96 - 112 mmol/L    Co2 23 20 - 33 mmol/L    Anion Gap 13.0 7.0 - 16.0    Glucose 88 65 - 99 mg/dL    Bun 34 (H) 8 - 22 mg/dL    Creatinine 1.51 (H) 0.50 - 1.40 mg/dL    Calcium 10.0 8.5 - 10.5 mg/dL    Correct Calcium 9.8 8.5 - 10.5 mg/dL    AST(SGOT) 19 12 - 45 U/L    ALT(SGPT) 12 2 - 50 U/L    Alkaline Phosphatase 66 30 - 99 U/L    Total Bilirubin 0.6 0.1 - 1.5 mg/dL    Albumin 4.3 3.2 - 4.9 g/dL    Total Protein 7.3 6.0 - 8.2 g/dL    Globulin 3.0 1.9 - 3.5 g/dL    A-G Ratio 1.4 g/dL   TROPONIN    Collection Time: 06/02/25  5:45 PM   Result Value Ref Range    Troponin T 19 6 - 19 ng/L   APTT    Collection Time: 06/02/25  5:45 PM   Result Value Ref Range    APTT 25.3 24.7 - 36.0 sec   PROTHROMBIN TIME (INR)    Collection Time: 06/02/25  5:45 PM   Result Value Ref Range    PT 14.1 12.0 - 14.6 sec    INR 1.08 0.87 - 1.13   HEMOGLOBIN A1C    Collection Time: 06/02/25  5:45 PM   Result Value Ref Range    Glycohemoglobin 6.0 (H) 4.0 - 5.6 %    Est Avg Glucose 126 mg/dL   Lipid Profile    Collection Time: 06/02/25  5:45 PM   Result Value Ref Range    Cholesterol,Tot 184 100 - 199 mg/dL    Triglycerides 209 (H) 0 - 149 mg/dL    HDL 40 >=40 mg/dL     (H) <100 mg/dL   ESTIMATED GFR    Collection Time: 06/02/25  5:45 PM   Result Value Ref Range    GFR (CKD-EPI) 45 (A) >60 mL/min/1.73 m 2      All labs reviewed by me. Labs were compared to prior labs if they were available. Significant for no leukocytosis, no anemia, normal electrolytes, normal glucose, mild LATASHA with creatinine 1.5, normal liver enzymes, normal bilirubin, troponin negative, PT and INR normal.    RADIOLOGY  I have independently interpreted the diagnostic imaging associated with this visit and am waiting the final reading from the radiologist.   My preliminary interpretation is a follows: No obvious intracranial hemorrhage  Radiologist interpretation is as follows:  CT-CTA NECK WITH &  "W/O-POST PROCESSING   Final Result         1. Atherosclerosis with approximately 50% diameter stenosis of the proximal right internal carotid artery and less than 50% diameter stenosis of the left internal carotid.   2. Moderate stenosis of the vertebral artery origins bilaterally.      CT-CTA HEAD WITH & W/O-POST PROCESS   Final Result      1.  Atherosclerosis with moderate stenoses of the distal internal carotid arteries.   2.  High-grade stenosis of the left posterior cerebral artery.   3.  No large vessel occlusion or aneurysm.   4.  Atrophy, chronic white matter disease, and chronic infarcts.      CT-CEREBRAL PERFUSION ANALYSIS   Final Result      1. Cerebral blood flow less than 30% possibly representing completed infarct = 19 mL. Based on distribution of this finding, this is possibly artifact.      2. T Max more than 6 seconds possibly representing combination of completed infarct and ischemia = 162 mL. Based on the distribution of this finding, this is possibly artifact.      3. Mismatched volume possibly representing ischemic brain/penumbra= 143 mL      4.  Please note that this cerebral perfusion study and report is Quantitative and targets supratentorial (cerebral) perfusion for evaluation of large vessel territory acute ischemia/infarction. For example, lacunar infarcts, and brainstem/posterior fossa    ischemia/infarction are not evaluated on this study.  Data acquisition is subject to artifacts which can yield non-anatomically plausible perfusion maps which may be due to motion, bolus timing, signal to noise ratio, or other technical factors.    Perfusion map abnormalities which show non-anatomic distributions are likely artifact.   This study is not \"stand-alone\" and should only be utilized for diagnosis, management/treatment in correlation with CT, CTA, and/or MRI and clinical factors.         MR-BRAIN-W/O    (Results Pending)   EC-ECHOCARDIOGRAM COMPLETE W/O CONT    (Results Pending)     COURSE & " MEDICAL DECISION MAKING  Nursing notes, VS, PMSFHx, labs, imaging, EKG reviewed in chart.    Heart Score: Moderate    ED Observation Status? No; Patient does not meet criteria for ED Observation.     Ddx: Vertigo, posterior cerebellar infarct, hypertensive emergency    MDM: 5:25 PM Yogesh Orozco is a 85 y.o. male who presented with evaluation as a transfer for a posterior cerebellar infarct of the PCA.  Patient said dizziness throughout 2 weeks as worsening went to outside facility where CT imaging showed acute occlusion of PCA.  Discussed with neurology and patient was sent here for repeat imaging and consideration of whether IR might be helpful.  His labs were normal there.  Symptoms come and go and patient is now currently asymptomatic and feels back to normal.  Upon arrival here he is hypertensive but otherwise has normal vital signs.  Stat page Dr. Delgado of neurology who will evaluate the patient.  On my physical exam patient has no deficits no lateralizing symptoms and normal strength and sensation throughout.  NIH stroke scale of 0.  Not a tPA candidate considering resolution of symptoms.  Patient admitted for MRI and typical stroke protocol management.  Patient updated on plan for admission and is amenable.  All labs reviewed by me. Labs were compared to prior labs if they were available. Significant for no leukocytosis, no anemia, normal electrolytes, normal glucose, mild LATASHA with creatinine 1.5, normal liver enzymes, normal bilirubin, troponin negative, PT and INR normal.  Repeat CT imaging shows multiple areas of high-grade stenosis but no acute clot appreciated..    5:36 PM I discussed the patient's case and the above findings with Dr. Delgado (Neurology).     5:48 PM I discussed the patient's case and the above findings with Dr. Keen  (Internal Medicine) who will admit the patient.     ADDITIONAL PROBLEM LIST AND DISPOSITION    I have discussed management of the patient with the following physicians and  GIUSEPPE's:  Dr. Delgado (Neurology) ,Dr. Keen Hospitalist     Discussion of management with other [facility] or appropriate source(s): None     Barriers to care at this time, including but not limited to: None.     Decision tools and prescription drugs considered including, but not limited to: NIH Stroke Scale.    FINAL IMPRESSION  1. 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 Cerebrovascular accident (CVA), unspecified mechanism (HCC)       IDayana (Scribe), am scribing for, and in the presence of, Diego Diaz.    Electronically signed by: Dayana Sinclair (Scribe), 6/2/2025    IDiego personally performed the services described in this documentation, as scribed by Dayana Sinclair in my presence, and it is both accurate and complete.    The note accurately reflects work and decisions made by me.  Diego Diaz  6/2/2025  7:10 PM         [1]   Social History  Tobacco Use    Smoking status: Never    Smokeless tobacco: Never   Substance Use Topics    Alcohol use: Yes     Comment: occas    Drug use: No   [2]   Allergies  Allergen Reactions    Clopidogrel Unspecified     Muscle weakness and fatigue     Lisinopril      Muscle problems    Atorvastatin Myalgia     Muscle problems

## 2025-06-03 NOTE — CARE PLAN
The patient is Watcher - Medium risk of patient condition declining or worsening    Shift Goals  Clinical Goals: -180, Q2 neuro, stable/improved neuro exam, MRI  Patient Goals: Updates  Family Goals: Updates    Progress made toward(s) clinical / shift goals:        Problem: Optimal Care of the Stroke Patient  Goal: Optimal acute care for the stroke patient  Outcome: Progressing     Problem: Discharge Planning - Stroke  Goal: Patient’s continuum of care needs will be met  Outcome: Progressing  Note: Orders placed for PT, OT, SLP evaluation      Problem: Neuro Status  Goal: Neuro status will remain stable or improve  Outcome: Progressing  Note: Neuro exams completed per provider order, neuro exam stable     Problem: Hemodynamic Monitoring  Goal: Patient's hemodynamics, fluid balance and neurologic status will be stable or improve  Outcome: Progressing  Note: Goal -180     Problem: Knowledge Deficit - Standard  Goal: Patient and family/care givers will demonstrate understanding of plan of care, disease process/condition, diagnostic tests and medications  Outcome: Progressing  Note: MRI completed per provider order       Patient is not progressing towards the following goals: N/A

## 2025-06-04 ENCOUNTER — APPOINTMENT (OUTPATIENT)
Dept: RADIOLOGY | Facility: MEDICAL CENTER | Age: 85
DRG: 066 | End: 2025-06-04
Attending: PSYCHIATRY & NEUROLOGY
Payer: MEDICARE

## 2025-06-04 LAB
ALBUMIN SERPL BCP-MCNC: 4.1 G/DL (ref 3.2–4.9)
ALBUMIN/GLOB SERPL: 1.4 G/DL
ALP SERPL-CCNC: 68 U/L (ref 30–99)
ALT SERPL-CCNC: 11 U/L (ref 2–50)
ANION GAP SERPL CALC-SCNC: 14 MMOL/L (ref 7–16)
AST SERPL-CCNC: 20 U/L (ref 12–45)
BILIRUB SERPL-MCNC: 0.5 MG/DL (ref 0.1–1.5)
BUN SERPL-MCNC: 37 MG/DL (ref 8–22)
CALCIUM ALBUM COR SERPL-MCNC: 9.5 MG/DL (ref 8.5–10.5)
CALCIUM SERPL-MCNC: 9.6 MG/DL (ref 8.5–10.5)
CHLORIDE SERPL-SCNC: 103 MMOL/L (ref 96–112)
CO2 SERPL-SCNC: 20 MMOL/L (ref 20–33)
CREAT SERPL-MCNC: 1.92 MG/DL (ref 0.5–1.4)
ERYTHROCYTE [DISTWIDTH] IN BLOOD BY AUTOMATED COUNT: 39.8 FL (ref 35.9–50)
GFR SERPLBLD CREATININE-BSD FMLA CKD-EPI: 34 ML/MIN/1.73 M 2
GLOBULIN SER CALC-MCNC: 2.9 G/DL (ref 1.9–3.5)
GLUCOSE SERPL-MCNC: 135 MG/DL (ref 65–99)
HCT VFR BLD AUTO: 47.8 % (ref 42–52)
HGB BLD-MCNC: 15.9 G/DL (ref 14–18)
MAGNESIUM SERPL-MCNC: 2.2 MG/DL (ref 1.5–2.5)
MCH RBC QN AUTO: 27.7 PG (ref 27–33)
MCHC RBC AUTO-ENTMCNC: 33.3 G/DL (ref 32.3–36.5)
MCV RBC AUTO: 83.3 FL (ref 81.4–97.8)
PHOSPHATE SERPL-MCNC: 3.8 MG/DL (ref 2.5–4.5)
PLATELET # BLD AUTO: 252 K/UL (ref 164–446)
PMV BLD AUTO: 10 FL (ref 9–12.9)
POTASSIUM SERPL-SCNC: 4.1 MMOL/L (ref 3.6–5.5)
PROT SERPL-MCNC: 7 G/DL (ref 6–8.2)
RBC # BLD AUTO: 5.74 M/UL (ref 4.7–6.1)
SODIUM SERPL-SCNC: 137 MMOL/L (ref 135–145)
WBC # BLD AUTO: 8 K/UL (ref 4.8–10.8)

## 2025-06-04 PROCEDURE — 700111 HCHG RX REV CODE 636 W/ 250 OVERRIDE (IP): Mod: JZ | Performed by: STUDENT IN AN ORGANIZED HEALTH CARE EDUCATION/TRAINING PROGRAM

## 2025-06-04 PROCEDURE — 85027 COMPLETE CBC AUTOMATED: CPT

## 2025-06-04 PROCEDURE — 97530 THERAPEUTIC ACTIVITIES: CPT

## 2025-06-04 PROCEDURE — 83735 ASSAY OF MAGNESIUM: CPT

## 2025-06-04 PROCEDURE — 70551 MRI BRAIN STEM W/O DYE: CPT

## 2025-06-04 PROCEDURE — A9270 NON-COVERED ITEM OR SERVICE: HCPCS | Performed by: STUDENT IN AN ORGANIZED HEALTH CARE EDUCATION/TRAINING PROGRAM

## 2025-06-04 PROCEDURE — 700102 HCHG RX REV CODE 250 W/ 637 OVERRIDE(OP): Performed by: STUDENT IN AN ORGANIZED HEALTH CARE EDUCATION/TRAINING PROGRAM

## 2025-06-04 PROCEDURE — 700105 HCHG RX REV CODE 258

## 2025-06-04 PROCEDURE — 97116 GAIT TRAINING THERAPY: CPT

## 2025-06-04 PROCEDURE — 700111 HCHG RX REV CODE 636 W/ 250 OVERRIDE (IP)

## 2025-06-04 PROCEDURE — 99291 CRITICAL CARE FIRST HOUR: CPT

## 2025-06-04 PROCEDURE — 84100 ASSAY OF PHOSPHORUS: CPT

## 2025-06-04 PROCEDURE — 770022 HCHG ROOM/CARE - ICU (200)

## 2025-06-04 PROCEDURE — 99233 SBSQ HOSP IP/OBS HIGH 50: CPT | Performed by: PSYCHIATRY & NEUROLOGY

## 2025-06-04 PROCEDURE — 80053 COMPREHEN METABOLIC PANEL: CPT

## 2025-06-04 RX ORDER — HYDRALAZINE HYDROCHLORIDE 20 MG/ML
10-20 INJECTION INTRAMUSCULAR; INTRAVENOUS EVERY 4 HOURS PRN
Status: DISCONTINUED | OUTPATIENT
Start: 2025-06-04 | End: 2025-06-05

## 2025-06-04 RX ORDER — LABETALOL HYDROCHLORIDE 5 MG/ML
10-20 INJECTION, SOLUTION INTRAVENOUS EVERY 4 HOURS PRN
Status: DISCONTINUED | OUTPATIENT
Start: 2025-06-04 | End: 2025-06-05

## 2025-06-04 RX ADMIN — PHENYLEPHRINE HYDROCHLORIDE 0.75 MCG/KG/MIN: 100 INJECTION INTRAVENOUS at 21:00

## 2025-06-04 RX ADMIN — ROSUVASTATIN CALCIUM 40 MG: 20 TABLET, FILM COATED ORAL at 17:22

## 2025-06-04 RX ADMIN — PHENYLEPHRINE HYDROCHLORIDE 0.75 MCG/KG/MIN: 100 INJECTION INTRAVENOUS at 16:02

## 2025-06-04 RX ADMIN — ASPIRIN 81 MG: 81 TABLET, COATED ORAL at 05:01

## 2025-06-04 RX ADMIN — ENOXAPARIN SODIUM 40 MG: 100 INJECTION SUBCUTANEOUS at 17:23

## 2025-06-04 RX ADMIN — PHENYLEPHRINE HYDROCHLORIDE 0.5 MCG/KG/MIN: 100 INJECTION INTRAVENOUS at 08:28

## 2025-06-04 ASSESSMENT — PAIN DESCRIPTION - PAIN TYPE
TYPE: ACUTE PAIN

## 2025-06-04 ASSESSMENT — ENCOUNTER SYMPTOMS: FOCAL WEAKNESS: 1

## 2025-06-04 ASSESSMENT — COGNITIVE AND FUNCTIONAL STATUS - GENERAL
SUGGESTED CMS G CODE MODIFIER DAILY ACTIVITY: CH
MOBILITY SCORE: 24
SUGGESTED CMS G CODE MODIFIER MOBILITY: CH
DAILY ACTIVITIY SCORE: 24

## 2025-06-04 ASSESSMENT — GAIT ASSESSMENTS
GAIT LEVEL OF ASSIST: SUPERVISED
DISTANCE (FEET): 200
ASSISTIVE DEVICE: FRONT WHEEL WALKER

## 2025-06-04 NOTE — CARE PLAN
Problem: Optimal Care of the Stroke Patient  Goal: Optimal emergency care for the stroke patient  Outcome: Progressing  Goal: Optimal acute care for the stroke patient  Outcome: Progressing     Problem: Knowledge Deficit - Stroke Education  Goal: Patient's knowledge of stroke and risk factors will improve  Outcome: Progressing     Problem: Neuro Status  Goal: Neuro status will remain stable or improve  Outcome: Progressing     Problem: Mobility - Stroke  Goal: Patient's capacity to carry out activities will improve  Outcome: Progressing  Goal: Spasticity will be prevented or improved  Outcome: Progressing  Goal: Subluxation will be prevented or improved  Outcome: Progressing     Problem: Self Care  Goal: Patient will have the ability to perform ADLs independently or with assistance (bathe, groom, dress, toilet and feed)  Outcome: Progressing   The patient is Watcher - Medium risk of patient condition declining or worsening    Shift Goals  Clinical Goals: Titrate vasopressor to achieve normal neuro exam specifically numbness to RUE  Patient Goals: go home  Family Goals: no family at this time    Progress made toward(s) clinical / shift goals:  met    Patient is not progressing towards the following goals:

## 2025-06-04 NOTE — PROGRESS NOTES
Texted Dr. Mccurdy to inform him that pt's slight increase in right arm weakness and SS have not improved with increasing his BP with Abdullahi. Per MD, increase Abdullahi to maintain SBP > 150.

## 2025-06-04 NOTE — PROGRESS NOTES
Neurology Progress Note  Neurohospitalist Service, [facility]    Referring Physician: Laci Mederos M.D.    Chief Complaint   Patient presents with    Possible Stroke     Pt BIB EMS from [facility] area. Per report pt has had vertigo for the past 4 days, RLE/RUE decreased sensation and ended up spilling a coffee cup due to weakness in right hand. Pt reports 1200 to have had right leg weakness and ataxia. Pt comes to [facility] for further eval, pt reporting most symptoms to have resolved.     Ear Pain    Hypertension       HPI: Refer to initial documented Neurology H&P, as detailed in the patient's chart.    Interval History 6/4: Patient has severe right upper extremity weakness and dysarthria when his pressures dropped in the 90s systolic yesterday.  Improved after increasing blood pressure with pressors overnight.    Review of systems: In addition to what is detailed in the HPI and/or updated in the interval history, all other systems reviewed and are negative.    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    He has no past medical history of Anesthesia, Anginal syndrome ([facility]), Arrhythmia, Asthma, At risk for sleep apnea, Blood clotting disorder ([facility]), Breath shortness, Bronchitis, Cancer (HCC), Cataract, Cold, Congestive heart failure (HCC), Dental disorder, Emphysema of lung (HCC), Glaucoma, Heart burn, Heart murmur, Heart valve disease, Hemorrhagic disorder ([facility]), Hepatitis A, Hepatitis B, Hepatitis C, Indigestion, Pacemaker, Pneumonia, Psychiatric problem, Rheumatic fever, Seizure (HCC), Sleep apnea, Snoring, or Urinary  incontinence.    FHx:  family history includes Cancer in his father and mother.    SHx:   reports that he has never smoked. He has never used smokeless tobacco. He reports current alcohol use. He reports that he does not use drugs.    Medications:  Current Medications[1]    Physical Examination:     Vitals:    06/04/25 0830 06/04/25 0845 06/04/25 0900 06/04/25 0915   BP:   137/83 105/64   Pulse: 75 100 63 63   Resp:       Temp:       TempSrc:       SpO2: 92%  93% 93%   Weight:       Height:               NEUROLOGICAL EXAM:     Patient is awake alert [place] x 4.  Speech is intact.  Able to repeat and follows commands.  Face is symmetrical.  Pupils equal reactive.  Extract muscles intact.  Hearing intact.  Visual fields intact.  Sustained anticoagulant in all 4.  Slight decrease sensation to soft touch in the right hand compared to left.  No obvious signs ataxia on finger-nose testing.    Objective Data:    Labs:  Lab Results   Component Value Date/Time    PROTHROMBTM 14.1 06/02/2025 05:45 PM    INR 1.08 06/02/2025 05:45 PM      Lab Results   Component Value Date/Time    WBC 8.0 06/04/2025 05:00 AM    RBC 5.74 06/04/2025 05:00 AM    HEMOGLOBIN 15.9 06/04/2025 05:00 AM    HEMATOCRIT 47.8 06/04/2025 05:00 AM    MCV 83.3 06/04/2025 05:00 AM    MCH 27.7 06/04/2025 05:00 AM    MCHC 33.3 06/04/2025 05:00 AM    MPV 10.0 06/04/2025 05:00 AM    NEUTSPOLYS 67.10 06/02/2025 05:45 PM    NEUTSPOLYS 64 06/02/2025 02:24 PM    LYMPHOCYTES 17.70 (L) 06/02/2025 05:45 PM    LYMPHOCYTES 18 (L) 06/02/2025 02:24 PM    MONOCYTES 8.30 06/02/2025 05:45 PM    MONOCYTES 10 06/02/2025 02:24 PM    EOSINOPHILS 5.40 06/02/2025 05:45 PM    EOSINOPHILS 6 06/02/2025 02:24 PM    BASOPHILS 1.10 06/02/2025 05:45 PM    BASOPHILS 1 06/02/2025 02:24 PM      Lab Results   Component Value Date/Time    SODIUM 137 06/04/2025 05:00 AM    POTASSIUM 4.1 06/04/2025 05:00 AM    CHLORIDE 103 06/04/2025 05:00 AM    CO2 20 06/04/2025 05:00 AM    GLUCOSE 135 (H)  06/04/2025 05:00 AM    BUN 37 (H) 06/04/2025 05:00 AM    CREATININE 1.92 (H) 06/04/2025 05:00 AM      Lab Results   Component Value Date/Time    CHOLSTRLTOT 169 06/03/2025 04:18 AM    LDL 95 06/03/2025 04:18 AM    HDL 36 (A) 06/03/2025 04:18 AM    TRIGLYCERIDE 192 (H) 06/03/2025 04:18 AM       Lab Results   Component Value Date/Time    ALKPHOSPHAT 68 06/04/2025 05:00 AM    ASTSGOT 20 06/04/2025 05:00 AM    ALTSGPT 11 06/04/2025 05:00 AM    TBILIRUBIN 0.5 06/04/2025 05:00 AM        Imaging/Testing:  EC-ECHOCARDIOGRAM COMPLETE W/O CONT   Final Result      MR-BRAIN-W/O   Final Result         No acute process. Age-related volume loss and chronic microvascular ischemic changes. 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      CT-CTA NECK WITH & W/O-POST PROCESSING   Final Result         1. Atherosclerosis with approximately 50% diameter stenosis of the proximal right internal carotid artery and less than 50% diameter stenosis of the left internal carotid.   2. Moderate stenosis of the vertebral artery origins bilaterally.      CT-CTA HEAD WITH & W/O-POST PROCESS   Final Result      1.  Atherosclerosis with moderate stenoses of the distal internal carotid arteries.   2.  High-grade stenosis of the left posterior cerebral artery.   3.  No large vessel occlusion or aneurysm.   4.  Atrophy, chronic white matter disease, and chronic infarcts.      CT-CEREBRAL PERFUSION ANALYSIS   Final Result      1. Cerebral blood flow less than 30% possibly representing completed infarct = 19 mL. Based on distribution of this finding, this is possibly artifact.      2. T Max more than 6 seconds possibly representing combination of completed infarct and ischemia = 162 mL. Based on the distribution of this finding, this is possibly artifact.      3. Mismatched volume possibly representing ischemic brain/penumbra= 143 mL      4.  Please note that this cerebral perfusion study and report is  "Quantitative and targets supratentorial (cerebral) perfusion for evaluation of large vessel territory acute ischemia/infarction. For example, lacunar infarcts, and brainstem/posterior fossa    ischemia/infarction are not evaluated on this study.  Data acquisition is subject to artifacts which can yield non-anatomically plausible perfusion maps which may be due to motion, bolus timing, signal to noise ratio, or other technical factors.    Perfusion map abnormalities which show non-anatomic distributions are likely artifact.   This study is not \"stand-alone\" and should only be utilized for diagnosis, management/treatment in correlation with CT, CTA, and/or MRI and clinical factors.         MR-BRAIN-W/O    (Results Pending)         Assessment and Plan:    85-year-old male presenting with right-sided weakness found to have a left P2 high-grade stenosis.  MRI brain did not reveal a acute infarct.  However this lesion appears to be pressure dependent.  Patient developed right extremity weakness with pressures in the 90 systolic yesterday.  Symptoms improved after getting the pressure above 130 yesterday.  Currently overnight the been running in the 160s to 180s.  Plan today to slowly decrease the pressure between 120-150.  Going to restart him on effient for DAPT therapy given the symptomatic intracranial stenosis.  Patient currently on Xtandi for prostate cancer which limits medication options due to interactions.    Plan:  1.  Restart effient  2.  Blood pressure parameters between 120-150 systolic.  3.  Repeat MRI brain given the new symptoms yesterday.  4.  Maintain an LDL below 70 with a statin.  5.  Continue aspirin 81 mg daily.      Spent over 58 minutes reviewing the chart including MRI, CTAs, and examined the patient and going to care plan the primary team.      The evaluation of the patient, and recommended management, was discussed with the resident staff. I have performed a physical exam and reviewed and " updated ROS and Plan today (6/4/2025). In review of yesterday's note (6/3/2025), there are no changes except as documented above.    This chart was partially generated using voice recognition technology and sound alike word replacement may be present, best efforts were made to make the chart accurate.    Juan José Mccurdy MD  Board Certified Neurology, ABPN  (t) [phone]         [1]   Current Facility-Administered Medications:     losartan (Cozaar) tablet 100 mg, 100 mg, Oral, Q DAY, Cory Lynn, A.P.R.N., 100 mg at 06/03/25 1048    labetalol (Normodyne/Trandate) injection 10-20 mg, 10-20 mg, Intravenous, Q4HRS PRN, Cory Lynn A.P.R.N.    hydrALAZINE (Apresoline) injection 10-20 mg, 10-20 mg, Intravenous, Q4HRS PRN, Cory Lynn, A.P.R.N., 10 mg at 06/03/25 1252    aspirin EC tablet 81 mg, 81 mg, Oral, DAILY, Chad Keen M.D., 81 mg at 06/04/25 0501    acetaminophen (Tylenol) tablet 650 mg, 650 mg, Oral, Q6HRS PRN, Chad Keen M.D.    senna-docusate (Pericolace Or Senokot S) 8.6-50 MG per tablet 2 Tablet, 2 Tablet, Oral, Q EVENING **AND** polyethylene glycol/lytes (Miralax) Packet 1 Packet, 1 Packet, Oral, QDAY PRN, Chad Keen M.D.    ondansetron (Zofran) syringe/vial injection 4 mg, 4 mg, Intravenous, Q4HRS PRN **OR** ondansetron (Zofran ODT) dispertab 4 mg, 4 mg, Oral, Q4HRS PRN, Chad Keen M.D.    enoxaparin (Lovenox) inj 40 mg, 40 mg, Subcutaneous, DAILY AT 1800, Chad Keen M.D., 40 mg at 06/03/25 1714    rosuvastatin (Crestor) tablet 40 mg, 40 mg, Oral, Q EVENING, Chad Keen M.D., 40 mg at 06/03/25 1714    phenylephrine 40 mg/250 mL NS premix, 0-5 mcg/kg/min (Ideal), Intravenous, Continuous, HELEN LudwigP.R.N., Last Rate: 13.7 mL/hr at 06/04/25 0828, 0.5 mcg/kg/min at 06/04/25 0828    MD Alert...ICU Electrolyte Replacement per Pharmacy, , Other, PHARMACY TO DOSE, Ankit Barraza M.D.

## 2025-06-04 NOTE — PROGRESS NOTES
Pt describing at shift change assessment that his numbness in his right arm has been resolved however at 0800 he said the numbness has returned slightly. I updated Dr. Mccurdy via voalte and await to hear back from him.

## 2025-06-04 NOTE — CARE PLAN
The patient is Watcher - Medium risk of patient condition declining or worsening    Shift Goals  Clinical Goals: SBP = 150, Q2 hr neuros  Patient Goals: sleep  Family Goals: updates    Progress made toward(s) clinical / shift goals:    Problem: Neuro Status  Goal: Neuro status will remain stable or improve  Description: Target End Date:  Prior to discharge or change in level of careDocument on Neuro assessment in the Assessment flowsheet1.  Assess and monitor neurologic status per provider order/protocol/unit policy2.  Assess level of consciousness and orientation3.  Assess for speech, dysarthria, dysphagia, facial symmetry4.  Assess visual field, eye movements, gaze preference, pupil reaction and size5.  Assess muscle strength and motor response in all four extremities6.  Assess for sensation (numbness and tingling)7.  Assess basic neuro reflexes (cough, gag, corneal)8.  Identify changes in neuro status and report to provider for testing/treatment orders  Outcome: Progressing     Problem: Self Care  Goal: Patient will have the ability to perform ADLs independently or with assistance (bathe, groom, dress, toilet and feed)  Description: Target End Date:  Prior to discharge or change in level of careDocument on ADL flowsheet1.  Assess the capability and level of deficiency to perform ADLs2.  Encourage family/care giver involvement3.  Provide assistive devices4.  Consider PT/OT evaluations5.  Maintain support, give positive feedback, encourage self-care allowing extra time and verbal cuing as needed6.  Avoid doing something for patients they can do themselves, but provide assistance as needed7.  Assist in anticipating/planning individual needs8.  Collaborate with Case Management and  to meet discharge needs  Outcome: Progressing     Problem: Knowledge Deficit - Standard  Goal: Patient and family/care givers will demonstrate understanding of plan of care, disease process/condition, diagnostic tests and  medications  Description: Target End Date:  1-3 days or as soon as patient condition allowsDocument in Patient Education1.  Patient and family/caregiver oriented to unit, equipment, visitation policy and means for communicating concern2.  Complete/review Learning Assessment3.  Assess knowledge level of disease process/condition, treatment plan, diagnostic tests and medications4.  Explain disease process/condition, treatment plan, diagnostic tests and medications  Outcome: Progressing     Problem: Fall Risk  Goal: Patient will remain free from falls  Description: Target End Date:  Prior to discharge or change in level of careDocument interventions on the [facility] [place] Fall Risk Assessment1.  Assess for fall risk factors2.  Implement fall precautions  Outcome: Progressing       Patient is not progressing towards the following goals:

## 2025-06-04 NOTE — THERAPY
Physical Therapy   Discharge     Patient Name:  Yogesh Orozco  Age:  85 y.o., Sex:  male  Medical Record #:  [id number]  Today's Date:  6/4/2025          Assessment    Rec'd pt alert, sitting in his chair.  No c/o pain.  Agreeable to use a fww today, which he is able to do safely and correctly.  Mobility as noted below.  Offered stair training prior to d/c, however pt is confident they will not be an issue and politely declines.  Recommend oob/amb prn w/ nsg and fww.  PT for d/c needs.    Plan    Reason for Discharge From Therapy: Discharge Secondary to Goals Met    DC Equipment Recommendations: Front-Wheel Walker  Discharge Recommendations: Recommend outpatient physical therapy services to address higher level deficits         Objective       06/04/25 0844   Balance   Sitting Balance (Static) Fair +   Sitting Balance (Dynamic) Fair +   Standing Balance (Static) Fair +   Standing Balance (Dynamic) Fair +   Weight Shift Sitting Good   Weight Shift Standing Good   Comments w/ fww   Bed Mobility    Comments rec'd and returned to chair, reports no bed mobility issues   Gait Analysis   Gait Level Of Assist Supervised   Assistive Device Front Wheel Walker   Distance (Feet) 200   # of Stairs Climbed   (declines the need, reports no issues)   Skilled Intervention Verbal Cuing   Functional Mobility   Sit to Stand Supervised   Bed, Chair, Wheelchair Transfer Supervised   Skilled Intervention Verbal Cuing   Short Term Goals    Short Term Goal # 1 Pt will amb with SPC supervised in 200ft in 6 visits to progress functional mobility   Goal Outcome # 1 Goal met   Short Term Goal # 2 Pt will ascend/descend 12 steps with rail SBA in 6 visits to access bedroom at home   Goal Outcome # 2   (Pt declines, stating he does not anticipate an issue.)   Physical Therapy Treatment Plan   Physical Therapy Treatment Plan Modify Current Treatment Plan   Duration Discharge Needs Only   Reason For Discharge Discharge Secondary to Goals Met    Anticipated Discharge Equipment and Recommendations   DC Equipment Recommendations Front-Wheel Walker   Discharge Recommendations Recommend outpatient physical therapy services to address higher level deficits

## 2025-06-04 NOTE — THERAPY
Occupational Therapy  Daily Treatment     Patient Name:  Yogesh Orozco  Age:  85 y.o., Sex:  male  Medical Record #:  [id number]  Today's Date:  6/4/2025         Assessment    Pt currently limited by decreased functional mobility, activity tolerance, strength, balance, which are affecting pt's ability to complete ADLs/IADLs at baseline. Pt would benefit from OT services in the acute care setting to maximize functional recovery.      Plan    Treatment Plan Status: (P) Continue Current Treatment Plan  Type of Treatment: Self Care / Activities of Daily Living, Therapeutic Activity  Treatment Frequency: 3 Times per Week  Treatment Duration: Until Therapy Goals Met       Discharge Recommendations: Anticipate that the patient will have no further occupational therapy needs after discharge from the hospital       06/04/25 0908   Active ROM Upper Body   Active ROM Upper Body  WDL   Strength Upper Body   Upper Body Strength  WDL   Balance   Sitting Balance (Static) Fair +   Sitting Balance (Dynamic) Fair   Standing Balance (Static) Fair -   Standing Balance (Dynamic) Fair -   Weight Shift Sitting Good   Weight Shift Standing Fair   Activities of Daily Living   Grooming Supervision   Upper Body Dressing Supervision   Lower Body Dressing Standby Assist   Functional Mobility   Sit to Stand Standby Assist   Bed, Chair, Wheelchair Transfer Standby Assist   Short Term Goals   Short Term Goal # 1 supervised with LB dressing   Goal Outcome # 1 Progressing as expected   Short Term Goal # 2 supervised with ADL txfs   Goal Outcome # 2 Goal met   Occupational Therapy Treatment Plan    O.T. Treatment Plan Continue Current Treatment Plan

## 2025-06-04 NOTE — PROGRESS NOTES
Updated Dr. Mccurdy that patient BP running 180s and he is having improvement of his symptoms (less weak in his right arm and less numbness) however still symptomatic. New parameters on -160. Updated Cory Lynn NP.

## 2025-06-04 NOTE — PROGRESS NOTES
"Critical Care Progress Note    Date of admission  6/2/2025    Chief Complaint  85 y.o. male admitted 6/2/2025 with   Chief Complaint   Patient presents with    Possible Stroke     Pt BIB EMS from [facility] area. Per report pt has had vertigo for the past 4 days, RLE/RUE decreased sensation and ended up spilling a coffee cup due to weakness in right hand. Pt reports 1200 to have had right leg weakness and ataxia. Pt comes to [facility] for further eval, pt reporting most symptoms to have resolved.     Ear Pain    Hypertension        Hospital Course  \"85 y.o. male, PMH CAD, DLD, HTN, CKD, prior history of CVA without residual deficits who presented 6/2/2025 in transfer from [facility] ED.  He reports 5 days ago he noticed trouble with balance and left ear pain that is waxed and waned for the past several days.  He is taking his blood pressure every day and has been in normal 110-120 range.  This morning when awakening he had worsening disequilibrium and hole picking up his coffee noted acute weakness in his right arm and hand.  He was having difficulty walking when his wife saw him and brought him to the local ED for evaluation.  Symptoms have since resolved.  CTA showed high-grade left P2 stenosis.  He has not had any recent infectious symptoms.  He is a non-smoker and nondrinker.  He is on baby aspirin and has a previous statin intolerance.  Currently in ED, A/O x 4, neurologic symptoms have resolved.  Current -170.\" Dr Barraza     Interval Problem Update  Reviewed last 24 hour events:  - Overnight Events:  - Patient's blood pressure dips into the 90s, patient symptomatic.  Increased SBP goals overnight.   - Tm: AF  - Neuro: No acute neuro changes   - HR: 50-70   - SBP:     -SBP: Goal 120-160   - RESP: RA   - UOP: 575 mL/24 hrs   - Davis: n   - GI: cardiac  - Lines: piv   - PPx: GI none, DVT lovenox   - Mobility level 3    Infusions:   -doris    ABX:   -none     Review of Systems  Review of Systems   Neurological:  " Positive for focal weakness.   All other systems reviewed and are negative.       Vital Signs for last 24 hours   Temp:  [36.4 °C (97.6 °F)-36.8 °C (98.2 °F)] 36.7 °C (98 °F)  Pulse:  [] 57  Resp:  [16-20] 18  BP: ()/(57-94) 139/74  SpO2:  [90 %-97 %] 93 %    Hemodynamic parameters for last 24 hours       Respiratory Information for the last 24 hours       Physical Exam   Physical Exam  Vitals reviewed.   Constitutional:       General: He is not in acute distress.     Appearance: He is not ill-appearing.   HENT:      Head: Normocephalic.      Nose: Nose normal.   Eyes:      Pupils: Pupils are equal, round, and reactive to light.   Cardiovascular:      Rate and Rhythm: Normal rate and regular rhythm.      Pulses: Normal pulses.   Pulmonary:      Effort: Pulmonary effort is normal. No respiratory distress.      Breath sounds: No wheezing.   Abdominal:      General: Abdomen is flat. There is no distension.      Palpations: Abdomen is soft.      Tenderness: There is no abdominal tenderness.   Musculoskeletal:         General: No swelling or tenderness.      Cervical back: Neck supple.   Skin:     General: Skin is warm and dry.      Capillary Refill: Capillary refill takes less than 2 seconds.   Neurological:      General: No focal deficit present.      Mental Status: He is alert and oriented to person, place, and time.      Cranial Nerves: No cranial nerve deficit.      Motor: No weakness.      Comments: Perfusion dependent right upper extremity weakness/tingling   Psychiatric:         Mood and Affect: Mood normal.         Medications  Current Medications[1]    Fluids    Intake/Output Summary (Last 24 hours) at 6/4/2025 1044  Last data filed at 6/4/2025 1024  Gross per 24 hour   Intake 1070.59 ml   Output 875 ml   Net 195.59 ml       Laboratory          Recent Labs     06/02/25  1745 06/03/25  0418 06/04/25  0500   SODIUM 140 141 137   POTASSIUM 4.1 4.0 4.1   CHLORIDE 104 106 103   CO2 23 20 20   BUN 34* 35*  37*   CREATININE 1.51* 1.71* 1.92*   MAGNESIUM  --  2.0 2.2   PHOSPHORUS  --  3.7 3.8   CALCIUM 10.0 9.2 9.6     Recent Labs     06/02/25 1745 06/03/25  0418 06/04/25  0500   ALTSGPT 12 11 11   ASTSGOT 19 16 20   ALKPHOSPHAT 66 62 68   TBILIRUBIN 0.6 0.4 0.5   GLUCOSE 88 106* 135*     Recent Labs     06/02/25  1424 06/02/25 1745 06/03/25  0418 06/04/25  0500   WBC 5.2 5.5 6.1 8.0   NEUTSPOLYS 64 67.10  --   --    LYMPHOCYTES 18* 17.70*  --   --    MONOCYTES 10 8.30  --   --    EOSINOPHILS 6 5.40  --   --    BASOPHILS 1 1.10  --   --    ASTSGOT  --  19 16 20   ALTSGPT  --  12 11 11   ALKPHOSPHAT  --  66 62 68   TBILIRUBIN  --  0.6 0.4 0.5     Recent Labs     06/02/25 1745 06/03/25 0418 06/04/25  0500   RBC 5.59 5.34 5.74   HEMOGLOBIN 15.9 15.1 15.9   HEMATOCRIT 46.8 45.2 47.8   PLATELETCT 203 199 252   PROTHROMBTM 14.1  --   --    APTT 25.3  --   --    INR 1.08  --   --        Imaging  CT:    Reviewed  MRI:   Reviewed    Assessment/Plan  * Acute CVA (cerebrovascular accident) (HCC)- (present on admission)  Assessment & Plan  Presenting with right upper extremity weakness and disequilibrium  CTA with high-grade stenosis of left PCA  Ischemic penumbra on CTP  Reviewed with vascular neurology  Admit to neuro ICU, every 2 hour neurochecks  Maintain hypertensive response, goal -160, monitor for previous symptoms  DAPT per neuro team  Prior statin intolerance, trial Crestor  Follow-up stroke labs  Echo and MRI completed  PT/OT/SLP    Prostate cancer (HCC)  Assessment & Plan  With local recurrence  Followed by [facility], Dr. Pedersen  Hold Xtandi (enzalutamide) acutely will likely resume on discharge  DAPT per neuroteam    Chronic kidney disease- (present on admission)  Assessment & Plan  Based on remote labs  Unclear current baseline  Follow renal function, avoid nephrotoxins    Benign essential hypertension- (present on admission)  Assessment & Plan  Hold Cozaar, currently maintaining hypertensive BP  response as above    CAD (coronary artery disease)- (present on admission)  Assessment & Plan  Continue ASA         VTE:  Lovenox  Ulcer: H2 Antagonist  Lines: None    I have performed a physical exam and reviewed and updated ROS and Plan today (6/4/2025). In review of yesterday's note (6/3/2025), there are no changes except as documented above.     Discussed patient condition and risk of morbidity and/or mortality with RN, RT, Therapies, Pharmacy, Dietary, , Charge nurse / hot rounds, Patient, neurology, and  my attending Dr Mederos.   The patient remains critically ill.  Critical care time = 65 minutes in directly providing and coordinating critical care and extensive data review.  No time overlap and excludes procedures.    Please note that this dictation was created using voice recognition software. The accuracy of the dictation is limited to the abilities of the software. I have made every reasonable attempt to correct obvious errors, but I expect that there are errors of grammar and possibly content that I did not discover before finalizing the note.     JASON Salazar        [1]   Current Facility-Administered Medications   Medication Dose Route Frequency Provider Last Rate Last Admin    hydrALAZINE (Apresoline) injection 10-20 mg  10-20 mg Intravenous Q4HRS PRN Cory Lynn A.P.R.N.        labetalol (Normodyne/Trandate) injection 10-20 mg  10-20 mg Intravenous Q4HRS PRN Cory Lynn A.P.R.N.        losartan (Cozaar) tablet 100 mg  100 mg Oral Q DAY Cory Lynn A.P.R.N.   100 mg at 06/03/25 1048    aspirin EC tablet 81 mg  81 mg Oral DAILY Chad Keen M.D.   81 mg at 06/04/25 0501    acetaminophen (Tylenol) tablet 650 mg  650 mg Oral Q6HRS PRN Chad Keen M.D.        senna-docusate (Pericolace Or Senokot S) 8.6-50 MG per tablet 2 Tablet  2 Tablet Oral Q EVENING Chad Keen M.D.        And    polyethylene glycol/lytes (Miralax) Packet 1 Packet  1 Packet Oral QDAY PRN Chad Keen  M.D.        ondansetron (Zofran) syringe/vial injection 4 mg  4 mg Intravenous Q4HRS PRN Chad Keen M.D.        Or    ondansetron (Zofran ODT) dispertab 4 mg  4 mg Oral Q4HRS PRN Chad Keen M.D.        enoxaparin (Lovenox) inj 40 mg  40 mg Subcutaneous DAILY AT 1800 Chad Keen M.D.   40 mg at 06/03/25 1714    rosuvastatin (Crestor) tablet 40 mg  40 mg Oral Q EVENING Chad Keen M.D.   40 mg at 06/03/25 1714    phenylephrine 40 mg/250 mL NS premix  0-5 mcg/kg/min (Ideal) Intravenous Continuous Cory Lynn, A.P.R.N. 47.9 mL/hr at 06/04/25 1016 1.75 mcg/kg/min at 06/04/25 1016    MD Alert...ICU Electrolyte Replacement per Pharmacy   Other PHARMACY TO DOSE Ankit Barraza M.D.

## 2025-06-04 NOTE — PROGRESS NOTES
Collaborating with team (Cory Lynn NP and Dr. Mccurdy) to titrate vasopressor to achieve resolution of numbness and tingling in RUE.

## 2025-06-05 LAB
ALBUMIN SERPL BCP-MCNC: 3.7 G/DL (ref 3.2–4.9)
ALBUMIN/GLOB SERPL: 1.3 G/DL
ALP SERPL-CCNC: 61 U/L (ref 30–99)
ALT SERPL-CCNC: 11 U/L (ref 2–50)
ANION GAP SERPL CALC-SCNC: 13 MMOL/L (ref 7–16)
AST SERPL-CCNC: 21 U/L (ref 12–45)
BILIRUB SERPL-MCNC: 0.4 MG/DL (ref 0.1–1.5)
BUN SERPL-MCNC: 37 MG/DL (ref 8–22)
CALCIUM ALBUM COR SERPL-MCNC: 9.4 MG/DL (ref 8.5–10.5)
CALCIUM SERPL-MCNC: 9.2 MG/DL (ref 8.5–10.5)
CHLORIDE SERPL-SCNC: 107 MMOL/L (ref 96–112)
CO2 SERPL-SCNC: 18 MMOL/L (ref 20–33)
CREAT SERPL-MCNC: 1.69 MG/DL (ref 0.5–1.4)
ERYTHROCYTE [DISTWIDTH] IN BLOOD BY AUTOMATED COUNT: 41.8 FL (ref 35.9–50)
GFR SERPLBLD CREATININE-BSD FMLA CKD-EPI: 39 ML/MIN/1.73 M 2
GLOBULIN SER CALC-MCNC: 2.8 G/DL (ref 1.9–3.5)
GLUCOSE SERPL-MCNC: 116 MG/DL (ref 65–99)
HCT VFR BLD AUTO: 44.9 % (ref 42–52)
HGB BLD-MCNC: 14.7 G/DL (ref 14–18)
MAGNESIUM SERPL-MCNC: 2.3 MG/DL (ref 1.5–2.5)
MCH RBC QN AUTO: 28.4 PG (ref 27–33)
MCHC RBC AUTO-ENTMCNC: 32.7 G/DL (ref 32.3–36.5)
MCV RBC AUTO: 86.8 FL (ref 81.4–97.8)
PHOSPHATE SERPL-MCNC: 3.7 MG/DL (ref 2.5–4.5)
PLATELET # BLD AUTO: 186 K/UL (ref 164–446)
PMV BLD AUTO: 10.2 FL (ref 9–12.9)
POTASSIUM SERPL-SCNC: 4.5 MMOL/L (ref 3.6–5.5)
PROT SERPL-MCNC: 6.5 G/DL (ref 6–8.2)
RBC # BLD AUTO: 5.17 M/UL (ref 4.7–6.1)
SODIUM SERPL-SCNC: 138 MMOL/L (ref 135–145)
WBC # BLD AUTO: 4.9 K/UL (ref 4.8–10.8)

## 2025-06-05 PROCEDURE — A9270 NON-COVERED ITEM OR SERVICE: HCPCS | Performed by: PSYCHIATRY & NEUROLOGY

## 2025-06-05 PROCEDURE — 700111 HCHG RX REV CODE 636 W/ 250 OVERRIDE (IP): Mod: JZ | Performed by: STUDENT IN AN ORGANIZED HEALTH CARE EDUCATION/TRAINING PROGRAM

## 2025-06-05 PROCEDURE — 700111 HCHG RX REV CODE 636 W/ 250 OVERRIDE (IP): Mod: JZ | Performed by: PSYCHIATRY & NEUROLOGY

## 2025-06-05 PROCEDURE — 84100 ASSAY OF PHOSPHORUS: CPT

## 2025-06-05 PROCEDURE — 99232 SBSQ HOSP IP/OBS MODERATE 35: CPT | Performed by: PSYCHIATRY & NEUROLOGY

## 2025-06-05 PROCEDURE — 770022 HCHG ROOM/CARE - ICU (200)

## 2025-06-05 PROCEDURE — A9270 NON-COVERED ITEM OR SERVICE: HCPCS | Performed by: INTERNAL MEDICINE

## 2025-06-05 PROCEDURE — 700102 HCHG RX REV CODE 250 W/ 637 OVERRIDE(OP): Performed by: PSYCHIATRY & NEUROLOGY

## 2025-06-05 PROCEDURE — 700102 HCHG RX REV CODE 250 W/ 637 OVERRIDE(OP): Performed by: STUDENT IN AN ORGANIZED HEALTH CARE EDUCATION/TRAINING PROGRAM

## 2025-06-05 PROCEDURE — 80053 COMPREHEN METABOLIC PANEL: CPT

## 2025-06-05 PROCEDURE — A9270 NON-COVERED ITEM OR SERVICE: HCPCS | Performed by: STUDENT IN AN ORGANIZED HEALTH CARE EDUCATION/TRAINING PROGRAM

## 2025-06-05 PROCEDURE — 700102 HCHG RX REV CODE 250 W/ 637 OVERRIDE(OP): Performed by: INTERNAL MEDICINE

## 2025-06-05 PROCEDURE — 85027 COMPLETE CBC AUTOMATED: CPT

## 2025-06-05 PROCEDURE — 83735 ASSAY OF MAGNESIUM: CPT

## 2025-06-05 PROCEDURE — 99291 CRITICAL CARE FIRST HOUR: CPT | Performed by: INTERNAL MEDICINE

## 2025-06-05 RX ORDER — HYDRALAZINE HYDROCHLORIDE 20 MG/ML
10-20 INJECTION INTRAMUSCULAR; INTRAVENOUS EVERY 4 HOURS PRN
Status: DISCONTINUED | OUTPATIENT
Start: 2025-06-05 | End: 2025-06-06

## 2025-06-05 RX ORDER — LABETALOL HYDROCHLORIDE 5 MG/ML
10-20 INJECTION, SOLUTION INTRAVENOUS EVERY 4 HOURS PRN
Status: DISCONTINUED | OUTPATIENT
Start: 2025-06-05 | End: 2025-06-07 | Stop reason: HOSPADM

## 2025-06-05 RX ORDER — PRASUGREL 10 MG/1
10 TABLET, FILM COATED ORAL DAILY
Status: DISCONTINUED | OUTPATIENT
Start: 2025-06-05 | End: 2025-06-07 | Stop reason: HOSPADM

## 2025-06-05 RX ORDER — MIDODRINE HYDROCHLORIDE 5 MG/1
10 TABLET ORAL
Status: DISCONTINUED | OUTPATIENT
Start: 2025-06-05 | End: 2025-06-06

## 2025-06-05 RX ADMIN — ASPIRIN 81 MG: 81 TABLET, COATED ORAL at 05:54

## 2025-06-05 RX ADMIN — MIDODRINE HYDROCHLORIDE 10 MG: 5 TABLET ORAL at 11:02

## 2025-06-05 RX ADMIN — ROSUVASTATIN CALCIUM 40 MG: 20 TABLET, FILM COATED ORAL at 17:34

## 2025-06-05 RX ADMIN — MIDODRINE HYDROCHLORIDE 10 MG: 5 TABLET ORAL at 17:35

## 2025-06-05 RX ADMIN — PRASUGREL 10 MG: 10 TABLET, FILM COATED ORAL at 07:58

## 2025-06-05 RX ADMIN — ENOXAPARIN SODIUM 40 MG: 100 INJECTION SUBCUTANEOUS at 17:35

## 2025-06-05 RX ADMIN — HYDRALAZINE HYDROCHLORIDE 10 MG: 20 INJECTION INTRAMUSCULAR; INTRAVENOUS at 22:23

## 2025-06-05 ASSESSMENT — PAIN DESCRIPTION - PAIN TYPE
TYPE: ACUTE PAIN

## 2025-06-05 ASSESSMENT — ENCOUNTER SYMPTOMS: FOCAL WEAKNESS: 1

## 2025-06-05 NOTE — PROGRESS NOTES
Patient stated when his SBP was up to 128, his change in neuro symptoms resolved. However, stated now that his SBP is 117, he is symptomatic again. Texted Dr. Mccurdy to make him aware of same.

## 2025-06-05 NOTE — PROGRESS NOTES
"Critical Care Progress Note    Date of admission  6/2/2025    Chief Complaint  85 y.o. male admitted 6/2/2025 with   Chief Complaint   Patient presents with    Possible Stroke     Pt BIB EMS from [facility] area. Per report pt has had vertigo for the past 4 days, RLE/RUE decreased sensation and ended up spilling a coffee cup due to weakness in right hand. Pt reports 1200 to have had right leg weakness and ataxia. Pt comes to [facility] for further eval, pt reporting most symptoms to have resolved.     Ear Pain    Hypertension        Hospital Course  \"85 y.o. male, PMH CAD, DLD, HTN, CKD, prior history of CVA without residual deficits who presented 6/2/2025 in transfer from [facility] ED.  He reports 5 days ago he noticed trouble with balance and left ear pain that is waxed and waned for the past several days.  He is taking his blood pressure every day and has been in normal 110-120 range.  This morning when awakening he had worsening disequilibrium and hole picking up his coffee noted acute weakness in his right arm and hand.  He was having difficulty walking when his wife saw him and brought him to the local ED for evaluation.  Symptoms have since resolved.  CTA showed high-grade left P2 stenosis.  He has not had any recent infectious symptoms.  He is a non-smoker and nondrinker.  He is on baby aspirin and has a previous statin intolerance.  Currently in ED, A/O x 4, neurologic symptoms have resolved.  Current -170.\" Dr Barraza     Interval Problem Update  Reviewed last 24 hour events:  - Overnight Events:  - Patient's blood pressure dips into the 90s, patient symptomatic.  Increased SBP goals overnight.   - Tm: AF  - Neuro: No acute neuro changes   - HR: 50-70   - SBP:     -SBP: Goal 110-140   - Abdullahi 0.25   - RESP: RA   - UOP: 575 mL/24 hrs   - Davis: n   - GI: cardiac - last BM   - Lines: piv   - PPx: GI none, DVT lovenox   - Mobility level 4  - MRI brain showed left thalamic infarct    Infusions:   -abdullahi    ABX: "   -none     Review of Systems  Review of Systems   Neurological:  Positive for focal weakness.   All other systems reviewed and are negative.       Vital Signs for last 24 hours   Temp:  [36.1 °C (96.9 °F)-36.5 °C (97.7 °F)] 36.2 °C (97.1 °F)  Pulse:  [52-85] 73  Resp:  [15-20] 16  BP: (102-189)/() 125/72  SpO2:  [90 %-97 %] 91 %    Hemodynamic parameters for last 24 hours       Respiratory Information for the last 24 hours       Physical Exam   Physical Exam  Vitals reviewed.   Constitutional:       General: He is not in acute distress.     Appearance: He is not ill-appearing.   HENT:      Head: Normocephalic.      Nose: Nose normal.   Eyes:      Pupils: Pupils are equal, round, and reactive to light.   Cardiovascular:      Rate and Rhythm: Normal rate and regular rhythm.      Pulses: Normal pulses.   Pulmonary:      Effort: Pulmonary effort is normal. No respiratory distress.      Breath sounds: No wheezing.   Abdominal:      General: Abdomen is flat. There is no distension.      Palpations: Abdomen is soft.      Tenderness: There is no abdominal tenderness.   Musculoskeletal:         General: No swelling or tenderness.      Cervical back: Neck supple.   Skin:     General: Skin is warm and dry.      Capillary Refill: Capillary refill takes less than 2 seconds.   Neurological:      General: No focal deficit present.      Mental Status: He is alert and oriented to person, place, and time.      Cranial Nerves: No cranial nerve deficit.      Motor: No weakness.      Comments: Perfusion dependent right upper extremity weakness/tingling   Psychiatric:         Mood and Affect: Mood normal.         Medications  Current Medications[1]    Fluids    Intake/Output Summary (Last 24 hours) at 6/5/2025 1830  Last data filed at 6/5/2025 1800  Gross per 24 hour   Intake 1620.14 ml   Output 1150 ml   Net 470.14 ml       Laboratory          Recent Labs     06/03/25  0418 06/04/25  0500 06/05/25  0452   SODIUM 141 137 138    POTASSIUM 4.0 4.1 4.5   CHLORIDE 106 103 107   CO2 20 20 18*   BUN 35* 37* 37*   CREATININE 1.71* 1.92* 1.69*   MAGNESIUM 2.0 2.2 2.3   PHOSPHORUS 3.7 3.8 3.7   CALCIUM 9.2 9.6 9.2     Recent Labs     06/03/25  0418 06/04/25  0500 06/05/25  0452   ALTSGPT 11 11 11   ASTSGOT 16 20 21   ALKPHOSPHAT 62 68 61   TBILIRUBIN 0.4 0.5 0.4   GLUCOSE 106* 135* 116*     Recent Labs     06/03/25  0418 06/04/25  0500 06/05/25  0452   WBC 6.1 8.0 4.9   ASTSGOT 16 20 21   ALTSGPT 11 11 11   ALKPHOSPHAT 62 68 61   TBILIRUBIN 0.4 0.5 0.4     Recent Labs     06/03/25  0418 06/04/25  0500 06/05/25  0452   RBC 5.34 5.74 5.17   HEMOGLOBIN 15.1 15.9 14.7   HEMATOCRIT 45.2 47.8 44.9   PLATELETCT 199 252 186       Imaging  CT:    Reviewed  MRI:   Reviewed    Assessment/Plan  * Acute CVA (cerebrovascular accident) (HCC)- (present on admission)  Assessment & Plan  Presenting with right upper extremity weakness and disequilibrium  CTA with high-grade stenosis of left PCA  Ischemic penumbra on CTP  Reviewed with vascular neurology  Admit to neuro ICU, every 2 hour neurochecks  Maintain hypertensive response, goal -140, monitor for previous symptoms  DAPT per neuro team  Prior statin intolerance, trial Crestor  Follow-up stroke labs  Echo and MRI completed  PT/OT/SLP    Prostate cancer (HCC)  Assessment & Plan  With local recurrence  Followed by [facility], Dr. Pedersen  Hold Xtandi (enzalutamide) acutely will likely resume on discharge  DAPT per neuroteam    Chronic kidney disease- (present on admission)  Assessment & Plan  Based on remote labs  Unclear current baseline  Follow renal function, avoid nephrotoxins    Benign essential hypertension- (present on admission)  Assessment & Plan  Hold Cozaar, currently maintaining hypertensive BP response as above    CAD (coronary artery disease)- (present on admission)  Assessment & Plan  Continue ASA         VTE:  Lovenox  Ulcer: H2 Antagonist  Lines: None    I have performed a physical  exam and reviewed and updated ROS and Plan today (6/5/2025). In review of yesterday's note (6/4/2025), there are no changes except as documented above.     Discussed patient condition and risk of morbidity and/or mortality with RN, RT, Therapies, Pharmacy, Dietary, , Charge nurse / hot rounds, Patient, neurology, and  my attending Dr Mederos.   The patient remains critically ill.  Critical care time = 51 minutes in directly providing and coordinating critical care and extensive data review.  No time overlap and excludes procedures.    Please note that this dictation was created using voice recognition software. The accuracy of the dictation is limited to the abilities of the software. I have made every reasonable attempt to correct obvious errors, but I expect that there are errors of grammar and possibly content that I did not discover before finalizing the note.     JASON Salazar          [1]   Current Facility-Administered Medications   Medication Dose Route Frequency Provider Last Rate Last Admin    prasugrel (Effient) tablet 10 mg  10 mg Oral DAILY Juan José Mccurdy M.D.   10 mg at 06/05/25 0758    hydrALAZINE (Apresoline) injection 10-20 mg  10-20 mg Intravenous Q4HRS PRN Juan José Mccurdy M.D.        labetalol (Normodyne/Trandate) injection 10-20 mg  10-20 mg Intravenous Q4HRS PRN Juan José Mccurdy M.D.        midodrine (Proamatine) tablet 10 mg  10 mg Oral TID WITH MEALS Laci Mederos M.D.   10 mg at 06/05/25 1735    [Held by provider] losartan (Cozaar) tablet 100 mg  100 mg Oral Q DAY MYKEL LudwigR.N.   100 mg at 06/03/25 1048    aspirin EC tablet 81 mg  81 mg Oral DAILY Chad Keen M.D.   81 mg at 06/05/25 0554    acetaminophen (Tylenol) tablet 650 mg  650 mg Oral Q6HRS PRN Chad Keen M.D.        senna-docusate (Pericolace Or Senokot S) 8.6-50 MG per tablet 2 Tablet  2 Tablet Oral Q EVENING Chad Keen M.D.        And    polyethylene glycol/lytes (Miralax) Packet 1  Packet  1 Packet Oral QDAY PRN Chad Keen M.D.        ondansetron (Zofran) syringe/vial injection 4 mg  4 mg Intravenous Q4HRS PRN Chad Keen M.D.        Or    ondansetron (Zofran ODT) dispertab 4 mg  4 mg Oral Q4HRS PRN Chad Keen M.D.        enoxaparin (Lovenox) inj 40 mg  40 mg Subcutaneous DAILY AT 1800 Chad Keen M.D.   40 mg at 06/05/25 1735    rosuvastatin (Crestor) tablet 40 mg  40 mg Oral Q EVENING Chad Keen M.D.   40 mg at 06/05/25 1734    phenylephrine 40 mg/250 mL NS premix  0-5 mcg/kg/min (Ideal) Intravenous Continuous Juan José Mccurdy M.D.   Stopped at 06/05/25 1548    MD Alert...ICU Electrolyte Replacement per Pharmacy   Other PHARMACY TO DOSE Ankit Barraza M.D.

## 2025-06-05 NOTE — CARE PLAN
The patient is Stable - Low risk of patient condition declining or worsening    Shift Goals  Clinical Goals: -160, Q2 neuro checks, MRI  Patient Goals: MRI  Family Goals: ARETHA    Progress made toward(s) clinical / shift goals:      Problem: Neuro Status  Goal: Neuro status will remain stable or improve  Outcome: Progressing  Note: Q2 neuro checks in place. Neuro status remained stable.      Problem: Hemodynamic Monitoring  Goal: Patient's hemodynamics, fluid balance and neurologic status will be stable or improve  Outcome: Progressing  Note: Maintained -160 per orders.     Problem: Mobility - Stroke  Goal: Patient's capacity to carry out activities will improve  Outcome: Progressing    Problem: Fall Risk  Goal: Patient will remain free from falls  Outcome: Progressing     Patient is not progressing towards the following goals: N/A

## 2025-06-05 NOTE — PROGRESS NOTES
Neurology Progress Note  Neurohospitalist Service, [facility]    Referring Physician: Laci Mederos M.D.    Chief Complaint   Patient presents with    Possible Stroke     Pt BIB EMS from [facility]. Per report pt has had vertigo for the past 4 days, RLE/RUE decreased sensation and ended up spilling a coffee cup due to weakness in right hand. Pt reports 1200 to have had right leg weakness and ataxia. Pt comes to [facility] for further eval, pt reporting most symptoms to have resolved.     Ear Pain    Hypertension       HPI: Refer to initial documented Neurology H&P, as detailed in the patient's chart.    Interval History 6/5: Patient says he feels close to his baseline currently.      Review of systems: In addition to what is detailed in the HPI and/or updated in the interval history, all other systems reviewed and are negative.    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    He has no past medical history of Anesthesia, Anginal syndrome (HCC), Arrhythmia, Asthma, At risk for sleep apnea, Blood clotting disorder (HCC), Breath shortness, Bronchitis, Cancer (HCC), Cataract, Cold, Congestive heart failure (HCC), Dental disorder, Emphysema of lung (HCC), Glaucoma, Heart burn, Heart murmur, Heart valve disease, Hemorrhagic disorder (HCC), Hepatitis A, Hepatitis B, Hepatitis C, Indigestion, Pacemaker, Pneumonia, Psychiatric problem, Rheumatic fever, Seizure (HCC), Sleep apnea, Snoring, or Urinary incontinence.    FHx:  family history includes Cancer in his father and mother.    SHx:   reports that he has never smoked. He has never used smokeless  tobacco. He reports current alcohol use. He reports that he does not use drugs.    Medications:  Current Medications[1]    Physical Examination:     Vitals:    06/05/25 0530 06/05/25 0545 06/05/25 0600 06/05/25 0700   BP: 138/73 136/72 (!) 142/76 (!) 140/76   Pulse: 64 (!) 57 62 68   Resp:   20 19   Temp:   36.4 °C (97.5 °F)    TempSrc:   Temporal    SpO2: 93% 92% 95% 94%   Weight:       Height:               NEUROLOGICAL EXAM:     Patient is awake alert [place] x 4.  Speech is intact.  Able to repeat and follows commands.  Face is symmetrical.  Pupils equal reactive.  Extract muscles intact.  Hearing intact.  Visual fields intact.  Sustained anticoagulant in all 4.  Slight decrease sensation to soft touch in the right hand compared to left.  No obvious signs ataxia on finger-nose testing.    Objective Data:    Labs:  Lab Results   Component Value Date/Time    PROTHROMBTM 14.1 06/02/2025 05:45 PM    INR 1.08 06/02/2025 05:45 PM      Lab Results   Component Value Date/Time    WBC 4.9 06/05/2025 04:52 AM    RBC 5.17 06/05/2025 04:52 AM    HEMOGLOBIN 14.7 06/05/2025 04:52 AM    HEMATOCRIT 44.9 06/05/2025 04:52 AM    MCV 86.8 06/05/2025 04:52 AM    MCH 28.4 06/05/2025 04:52 AM    MCHC 32.7 06/05/2025 04:52 AM    MPV 10.2 06/05/2025 04:52 AM    NEUTSPOLYS 67.10 06/02/2025 05:45 PM    NEUTSPOLYS 64 06/02/2025 02:24 PM    LYMPHOCYTES 17.70 (L) 06/02/2025 05:45 PM    LYMPHOCYTES 18 (L) 06/02/2025 02:24 PM    MONOCYTES 8.30 06/02/2025 05:45 PM    MONOCYTES 10 06/02/2025 02:24 PM    EOSINOPHILS 5.40 06/02/2025 05:45 PM    EOSINOPHILS 6 06/02/2025 02:24 PM    BASOPHILS 1.10 06/02/2025 05:45 PM    BASOPHILS 1 06/02/2025 02:24 PM      Lab Results   Component Value Date/Time    SODIUM 138 06/05/2025 04:52 AM    POTASSIUM 4.5 06/05/2025 04:52 AM    CHLORIDE 107 06/05/2025 04:52 AM    CO2 18 (L) 06/05/2025 04:52 AM    GLUCOSE 116 (H) 06/05/2025 04:52 AM    BUN 37 (H) 06/05/2025 04:52 AM    CREATININE 1.69 (H) 06/05/2025 04:52 AM       Lab Results   Component Value Date/Time    CHOLSTRLTOT 169 06/03/2025 04:18 AM    LDL 95 06/03/2025 04:18 AM    HDL 36 (A) 06/03/2025 04:18 AM    TRIGLYCERIDE 192 (H) 06/03/2025 04:18 AM       Lab Results   Component Value Date/Time    ALKPHOSPHAT 61 06/05/2025 04:52 AM    ASTSGOT 21 06/05/2025 04:52 AM    ALTSGPT 11 06/05/2025 04:52 AM    TBILIRUBIN 0.4 06/05/2025 04:52 AM        Imaging/Testing:  MR-BRAIN-W/O   Final Result         Acute infarct in the left thalamus.      Multiple remote infarcts in the bilateral frontal and left occipital regions.      Age-related volume loss and chronic microvascular ischemic changes.      EC-ECHOCARDIOGRAM COMPLETE W/O CONT   Final Result      MR-BRAIN-W/O   Final Result         No acute process. Age-related volume loss and chronic microvascular ischemic changes. 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      CT-CTA NECK WITH & W/O-POST PROCESSING   Final Result         1. Atherosclerosis with approximately 50% diameter stenosis of the proximal right internal carotid artery and less than 50% diameter stenosis of the left internal carotid.   2. Moderate stenosis of the vertebral artery origins bilaterally.      CT-CTA HEAD WITH & W/O-POST PROCESS   Final Result      1.  Atherosclerosis with moderate stenoses of the distal internal carotid arteries.   2.  High-grade stenosis of the left posterior cerebral artery.   3.  No large vessel occlusion or aneurysm.   4.  Atrophy, chronic white matter disease, and chronic infarcts.      CT-CEREBRAL PERFUSION ANALYSIS   Final Result      1. Cerebral blood flow less than 30% possibly representing completed infarct = 19 mL. Based on distribution of this finding, this is possibly artifact.      2. T Max more than 6 seconds possibly representing combination of completed infarct and ischemia = 162 mL. Based on the distribution of this finding, this is possibly artifact.      3. Mismatched volume  "possibly representing ischemic brain/penumbra= 143 mL      4.  Please note that this cerebral perfusion study and report is Quantitative and targets supratentorial (cerebral) perfusion for evaluation of large vessel territory acute ischemia/infarction. For example, lacunar infarcts, and brainstem/posterior fossa    ischemia/infarction are not evaluated on this study.  Data acquisition is subject to artifacts which can yield non-anatomically plausible perfusion maps which may be due to motion, bolus timing, signal to noise ratio, or other technical factors.    Perfusion map abnormalities which show non-anatomic distributions are likely artifact.   This study is not \"stand-alone\" and should only be utilized for diagnosis, management/treatment in correlation with CT, CTA, and/or MRI and clinical factors.               Assessment and Plan:    85-year-old male presenting with right-sided weakness found to have a left P2 high-grade stenosis.  MRI brain did not reveal a acute infarct.  However this lesion appears to be pressure dependent.  Patient developed right extremity weakness with pressures in the 90 systolic yesterday.  Symptoms improved after getting the pressure above 130 yesterday.  Currently overnight the been running in the 160s to 180s.  Plan today to slowly decrease the pressure between 120-150.  Going to restart him on effient for DAPT therapy given the symptomatic intracranial stenosis.  Patient currently on Xtandi for prostate cancer which limits medication options due to interactions.    Update 6/5.  Repeat MRI of brain confirmed a left thalamic infarct.  Confirming that the P2 stenosis is symptomatic.  Therefore warranting DAPT therapy for 90 days.  This is complicated by his prostate medication of Xtandi.  We chose effient as a second antiplatelet due to least possibility of interactions with Xtandi.  Compared to Plavix and Brilinta.  He will need to be on this for 90 days at least.  After which we can " discontinue the Effient.  Maintain slightly higher blood pressures  110-140  systolic for the next 3 to 6 weeks after which a slow normalization.  Avoid hypotension.    Plan:  1.  Continue aspirin 80 mg daily and Effient 10 mg daily for 90 days.  After which continue aspirin 81 mg daily monotherapy.  2.  Blood pressure parameters between 110-140 systolic.  3.  Repeat MRI brain confirmed acute left thalamic infarct.    4.  Maintain an LDL below 70 with a statin.  5.  Follow-up in vascular clinic.                This chart was partially generated using voice recognition technology and sound alike word replacement may be present, best efforts were made to make the chart accurate.    Juan José Mccurdy MD  Board Certified Neurology, ABPN  (t) [phone]         [1]   Current Facility-Administered Medications:     prasugrel (Effient) tablet 10 mg, 10 mg, Oral, DAILY, Juan José Mccurdy M.D., 10 mg at 06/05/25 0758    hydrALAZINE (Apresoline) injection 10-20 mg, 10-20 mg, Intravenous, Q4HRS PRN, Cory Lynn A.P.R.N.    labetalol (Normodyne/Trandate) injection 10-20 mg, 10-20 mg, Intravenous, Q4HRS PRN, Cory Lynn A.P.R.N.    losartan (Cozaar) tablet 100 mg, 100 mg, Oral, Q DAY, Cory Lynn A.P.R.N., 100 mg at 06/03/25 1048    aspirin EC tablet 81 mg, 81 mg, Oral, DAILY, Chad Keen M.D., 81 mg at 06/05/25 0554    acetaminophen (Tylenol) tablet 650 mg, 650 mg, Oral, Q6HRS PRN, Chad Keen M.D.    senna-docusate (Pericolace Or Senokot S) 8.6-50 MG per tablet 2 Tablet, 2 Tablet, Oral, Q EVENING **AND** polyethylene glycol/lytes (Miralax) Packet 1 Packet, 1 Packet, Oral, QDAY PRN, Chad Keen M.D.    ondansetron (Zofran) syringe/vial injection 4 mg, 4 mg, Intravenous, Q4HRS PRN **OR** ondansetron (Zofran ODT) dispertab 4 mg, 4 mg, Oral, Q4HRS PRN, Chad Keen M.D.    enoxaparin (Lovenox) inj 40 mg, 40 mg, Subcutaneous, DAILY AT 1800, Chad Keen M.D., 40 mg at 06/04/25 1723    rosuvastatin (Crestor)  tablet 40 mg, 40 mg, Oral, Q EVENING, Chad Keen M.D., 40 mg at 06/04/25 1722    phenylephrine 40 mg/250 mL NS premix, 0-5 mcg/kg/min (Ideal), Intravenous, Continuous, MYKEL LudwigRArleenNArleen, Stopped at 06/05/25 0052    MD Alert...ICU Electrolyte Replacement per Pharmacy, , Other, PHARMACY TO DOSE, Ankit Barraza M.D.

## 2025-06-06 LAB
ALBUMIN SERPL BCP-MCNC: 3.8 G/DL (ref 3.2–4.9)
ALBUMIN/GLOB SERPL: 1.4 G/DL
ALP SERPL-CCNC: 66 U/L (ref 30–99)
ALT SERPL-CCNC: 10 U/L (ref 2–50)
ANION GAP SERPL CALC-SCNC: 14 MMOL/L (ref 7–16)
AST SERPL-CCNC: 19 U/L (ref 12–45)
BILIRUB SERPL-MCNC: 0.4 MG/DL (ref 0.1–1.5)
BUN SERPL-MCNC: 44 MG/DL (ref 8–22)
CALCIUM ALBUM COR SERPL-MCNC: 9.5 MG/DL (ref 8.5–10.5)
CALCIUM SERPL-MCNC: 9.3 MG/DL (ref 8.5–10.5)
CHLORIDE SERPL-SCNC: 106 MMOL/L (ref 96–112)
CO2 SERPL-SCNC: 17 MMOL/L (ref 20–33)
CREAT SERPL-MCNC: 1.81 MG/DL (ref 0.5–1.4)
ERYTHROCYTE [DISTWIDTH] IN BLOOD BY AUTOMATED COUNT: 41.1 FL (ref 35.9–50)
GFR SERPLBLD CREATININE-BSD FMLA CKD-EPI: 36 ML/MIN/1.73 M 2
GLOBULIN SER CALC-MCNC: 2.8 G/DL (ref 1.9–3.5)
GLUCOSE SERPL-MCNC: 114 MG/DL (ref 65–99)
HCT VFR BLD AUTO: 44.5 % (ref 42–52)
HGB BLD-MCNC: 15.1 G/DL (ref 14–18)
MCH RBC QN AUTO: 28.8 PG (ref 27–33)
MCHC RBC AUTO-ENTMCNC: 33.9 G/DL (ref 32.3–36.5)
MCV RBC AUTO: 84.9 FL (ref 81.4–97.8)
PLATELET # BLD AUTO: 189 K/UL (ref 164–446)
PMV BLD AUTO: 10.1 FL (ref 9–12.9)
POTASSIUM SERPL-SCNC: 4.3 MMOL/L (ref 3.6–5.5)
PROT SERPL-MCNC: 6.6 G/DL (ref 6–8.2)
RBC # BLD AUTO: 5.24 M/UL (ref 4.7–6.1)
SODIUM SERPL-SCNC: 137 MMOL/L (ref 135–145)
WBC # BLD AUTO: 5.5 K/UL (ref 4.8–10.8)

## 2025-06-06 PROCEDURE — A9270 NON-COVERED ITEM OR SERVICE: HCPCS | Performed by: HOSPITALIST

## 2025-06-06 PROCEDURE — A9270 NON-COVERED ITEM OR SERVICE: HCPCS | Performed by: INTERNAL MEDICINE

## 2025-06-06 PROCEDURE — A9270 NON-COVERED ITEM OR SERVICE: HCPCS | Performed by: PSYCHIATRY & NEUROLOGY

## 2025-06-06 PROCEDURE — 700102 HCHG RX REV CODE 250 W/ 637 OVERRIDE(OP): Performed by: HOSPITALIST

## 2025-06-06 PROCEDURE — 700111 HCHG RX REV CODE 636 W/ 250 OVERRIDE (IP): Mod: JZ | Performed by: STUDENT IN AN ORGANIZED HEALTH CARE EDUCATION/TRAINING PROGRAM

## 2025-06-06 PROCEDURE — A9270 NON-COVERED ITEM OR SERVICE: HCPCS | Performed by: STUDENT IN AN ORGANIZED HEALTH CARE EDUCATION/TRAINING PROGRAM

## 2025-06-06 PROCEDURE — 99233 SBSQ HOSP IP/OBS HIGH 50: CPT | Performed by: HOSPITALIST

## 2025-06-06 PROCEDURE — 700111 HCHG RX REV CODE 636 W/ 250 OVERRIDE (IP): Mod: JZ | Performed by: NURSE PRACTITIONER

## 2025-06-06 PROCEDURE — 700102 HCHG RX REV CODE 250 W/ 637 OVERRIDE(OP): Performed by: PSYCHIATRY & NEUROLOGY

## 2025-06-06 PROCEDURE — 700102 HCHG RX REV CODE 250 W/ 637 OVERRIDE(OP): Performed by: INTERNAL MEDICINE

## 2025-06-06 PROCEDURE — 99232 SBSQ HOSP IP/OBS MODERATE 35: CPT | Performed by: PSYCHIATRY & NEUROLOGY

## 2025-06-06 PROCEDURE — 80053 COMPREHEN METABOLIC PANEL: CPT

## 2025-06-06 PROCEDURE — 700111 HCHG RX REV CODE 636 W/ 250 OVERRIDE (IP): Performed by: PSYCHIATRY & NEUROLOGY

## 2025-06-06 PROCEDURE — 700102 HCHG RX REV CODE 250 W/ 637 OVERRIDE(OP): Performed by: STUDENT IN AN ORGANIZED HEALTH CARE EDUCATION/TRAINING PROGRAM

## 2025-06-06 PROCEDURE — 770020 HCHG ROOM/CARE - TELE (206)

## 2025-06-06 PROCEDURE — 85027 COMPLETE CBC AUTOMATED: CPT

## 2025-06-06 RX ORDER — CALCIUM CARBONATE 500 MG/1
1000 TABLET, CHEWABLE ORAL
Status: DISCONTINUED | OUTPATIENT
Start: 2025-06-06 | End: 2025-06-07 | Stop reason: HOSPADM

## 2025-06-06 RX ORDER — ASPIRIN 81 MG/1
81 TABLET ORAL DAILY
Status: DISCONTINUED | OUTPATIENT
Start: 2025-06-07 | End: 2025-06-07 | Stop reason: HOSPADM

## 2025-06-06 RX ORDER — HYDRALAZINE HYDROCHLORIDE 20 MG/ML
10-20 INJECTION INTRAMUSCULAR; INTRAVENOUS
Status: DISCONTINUED | OUTPATIENT
Start: 2025-06-06 | End: 2025-06-07 | Stop reason: HOSPADM

## 2025-06-06 RX ORDER — HYDRALAZINE HYDROCHLORIDE 20 MG/ML
10-20 INJECTION INTRAMUSCULAR; INTRAVENOUS
Status: DISCONTINUED | OUTPATIENT
Start: 2025-06-06 | End: 2025-06-06

## 2025-06-06 RX ORDER — MIDODRINE HYDROCHLORIDE 5 MG/1
5 TABLET ORAL
Status: DISCONTINUED | OUTPATIENT
Start: 2025-06-06 | End: 2025-06-07 | Stop reason: HOSPADM

## 2025-06-06 RX ADMIN — HYDRALAZINE HYDROCHLORIDE 10 MG: 20 INJECTION, SOLUTION INTRAMUSCULAR; INTRAVENOUS at 03:34

## 2025-06-06 RX ADMIN — ASPIRIN 81 MG: 81 TABLET, COATED ORAL at 05:14

## 2025-06-06 RX ADMIN — HYDRALAZINE HYDROCHLORIDE 10 MG: 20 INJECTION, SOLUTION INTRAMUSCULAR; INTRAVENOUS at 02:34

## 2025-06-06 RX ADMIN — MIDODRINE HYDROCHLORIDE 5 MG: 5 TABLET ORAL at 11:30

## 2025-06-06 RX ADMIN — LABETALOL HYDROCHLORIDE 10 MG: 5 INJECTION, SOLUTION INTRAVENOUS at 05:09

## 2025-06-06 RX ADMIN — ROSUVASTATIN CALCIUM 40 MG: 20 TABLET, FILM COATED ORAL at 17:09

## 2025-06-06 RX ADMIN — PRASUGREL 10 MG: 10 TABLET, FILM COATED ORAL at 05:14

## 2025-06-06 RX ADMIN — ENOXAPARIN SODIUM 40 MG: 100 INJECTION SUBCUTANEOUS at 17:10

## 2025-06-06 RX ADMIN — MIDODRINE HYDROCHLORIDE 10 MG: 5 TABLET ORAL at 08:50

## 2025-06-06 RX ADMIN — MIDODRINE HYDROCHLORIDE 5 MG: 5 TABLET ORAL at 17:09

## 2025-06-06 RX ADMIN — ANTACID TABLETS 1000 MG: 500 TABLET, CHEWABLE ORAL at 18:22

## 2025-06-06 ASSESSMENT — PAIN DESCRIPTION - PAIN TYPE
TYPE: ACUTE PAIN

## 2025-06-06 ASSESSMENT — COGNITIVE AND FUNCTIONAL STATUS - GENERAL
DAILY ACTIVITIY SCORE: 23
MOVING TO AND FROM BED TO CHAIR: A LITTLE
SUGGESTED CMS G CODE MODIFIER DAILY ACTIVITY: CI
WALKING IN HOSPITAL ROOM: A LITTLE
STANDING UP FROM CHAIR USING ARMS: A LITTLE
DRESSING REGULAR LOWER BODY CLOTHING: A LITTLE
CLIMB 3 TO 5 STEPS WITH RAILING: A LOT
MOBILITY SCORE: 19
SUGGESTED CMS G CODE MODIFIER MOBILITY: CK

## 2025-06-06 ASSESSMENT — ENCOUNTER SYMPTOMS
EYES NEGATIVE: 1
MUSCULOSKELETAL NEGATIVE: 1
SENSORY CHANGE: 1
WEAKNESS: 1
GASTROINTESTINAL NEGATIVE: 1
RESPIRATORY NEGATIVE: 1
NERVOUS/ANXIOUS: 1
CARDIOVASCULAR NEGATIVE: 1

## 2025-06-06 NOTE — PROGRESS NOTES
Patient can transfer to medicine for acute post ischemic stroke care.     85y M hx of CAD, Chol, CKD, prior CVA presented from outside hospital for acute ischemic stroke. Found to have stenosis of left P2, MRI with left thalamic infarct.     Rounding report:  Neuro: q2 neuro, mira brisk, right hand numbness  HR: 50-70's  SBP: 130-170's off doris, hydralazine and labetalol x2  Tmax: afebrile  GI: regular diet  UOP: 1.4L  Lines: peripheral IV  Resp: r/a   Vte: lovenox  PPI/H2:n/a  Antibx: none     Transfer to floor  Drop to midodrine to 5mg  long term goal BP is 110-140 be careful with lower BP per neurology for 3-6 weeks.     Shamir Duffy MD  Critical Care Medicine

## 2025-06-06 NOTE — CARE PLAN
The patient is Stable - Low risk of patient condition declining or worsening    Shift Goals  Clinical Goals: Q2 neuro checks, -140  Patient Goals: Rest, go home tomorrow  Family Goals: Updates    Progress made toward(s) clinical / shift goals:      Problem: Neuro Status  Goal: Neuro status will remain stable or improve  Outcome: Progressing  Note: Q2 neuro checks in place. Neuro status remained stable.      Problem: Hemodynamic Monitoring  Goal: Patient's hemodynamics, fluid balance and neurologic status will be stable or improve  Outcome: Progressing  Note: Maintained -140 per orders. Administered medications per MAR.     Problem: Mobility - Stroke  Goal: Patient's capacity to carry out activities will improve  Outcome: Progressing     Problem: Fall Risk  Goal: Patient will remain free from falls  Outcome: Progressing     Problem: Knowledge Deficit - Standard  Goal: Patient and family/care givers will demonstrate understanding of plan of care, disease process/condition, diagnostic tests and medications  Outcome: Progressing       Patient is not progressing towards the following goals: N/A

## 2025-06-06 NOTE — PROGRESS NOTES
Report called to Dre TROY. Patient transferred to S190-01 on tele box with this RN.All belongings sent with patient including clothes, shoes, cellphone, ipad, chargers, hearing aides, glasses, dentures, and book.

## 2025-06-06 NOTE — DISCHARGE PLANNING
Case Management Discharge Planning    Admission Date: 6/2/2025  GMLOS: 1.9  ALOS: 4    6-Clicks ADL Score: 24  6-Clicks Mobility Score: 24    Anticipated Discharge Dispo: Discharge Disposition: Discharged to home/self care (01)  Discharge Address: [address] [place] [id number]  Discharge Contact Phone Number: [phone]    DME Needed: Yes    DME Ordered: No  FWW    Action(s) Taken:   Chart review was completed, and patient was discussed during IDT rounds.    Per IDT, continued BP management. Plan to transfer out of ICU level of care today.     Pt's current mobility and self-care score is 24/24. PT evaluated on 6/4 and recommended outpatient physical therapy services and a FWW. Pt was presented with a DME choice form and choice for [employer] was given. Choice form was faxed to the DPA to be saved in the media, pending MD orders.     Escalations Completed: None    Medically Clear: No    Next Steps:  CM RN to follow up with medical team to discuss discharge barriers or needs.     Barriers to Discharge: Medical clearance and DME

## 2025-06-06 NOTE — PROGRESS NOTES
[facility] Medicine Daily Progress Note    Date of Service  6/6/2025    Chief Complaint  Yogesh Orozco is a 85 y.o. male admitted 6/2/2025 with initial complaints of right-sided weakness, vertigo, dizziness    Hospital Course  This is a 85-year-old male with past med history of coronary artery disease, dyslipidemia, hypertension, CKD, prior history of CVA, no residual deficits then, presenting on 6/2/2025 as a transfer from [facility] emergency department, the patient reportedly had 5 days worth of noticing trouble with his balance, left ear pain, disequilibrium, then developing more acute weakness in the right hand and arm, difficulty with walking, brought to the emergency department for evaluations, the symptoms had then resolved, a CTA showed a high-grade left P2 stenosis, the patient is chronically on low-dose aspirin and had a previous statin intolerance.  The patient is undergoing treatment for prostate cancer.  MRI was negative for acute infarct, the lesion appears to be pressure dependent given the patient's the above described symptoms  The patient was maintained in the ICU level of care with blood pressure augmentation, neurology consulting  The repeat MRI then confirmed a left thalamic infarct, confirming the  significance of the P2 lesion, the patient was placed on aspirin in conjunction with Effient, there was a interaction secondary to the patient's prostate treatments medication regimen in form of Xtandi  The patient seen by neurology on 6/6 recommending to further adjust blood pressure systolic parameters between 110 and 140, to maintain LDL below 70  The patient overall improving, has been more steady and his strength and sensation has been improving on the right side  Interval Problem Update  Patient seen and examined today.  Data, Medication data reviewed.  Case discussed with nursing as available.  Plan of Care reviewed with patient and notified of changes.  6/6 the patient feels overall better,  his strength is improving in the right side, coordination is improving, currently no speech deficit, the patient is ongoing recommended to use a front wheel walker and have outpatient therapy as per recent PT notes  Follow-up with neurology noted  The patient has had fluctuating blood pressures and the recommendation was to have additional monitoring for further stabilization  I have discussed this patient's plan of care and discharge plan at IDT rounds today with Case Management, Nursing, Nursing leadership, and other members of the IDT team.    Consultants/Specialty  critical care and neurology    Code Status  Full Code    Disposition  The patient is not medically cleared for discharge to home or a post-acute facility.  Anticipate discharge to: home with close outpatient follow-up    I have placed the appropriate orders for post-discharge needs.    Review of Systems  Review of Systems   Constitutional:  Positive for malaise/fatigue.   HENT: Negative.     Eyes: Negative.    Respiratory: Negative.     Cardiovascular: Negative.    Gastrointestinal: Negative.    Genitourinary: Negative.    Musculoskeletal: Negative.    Skin: Negative.    Neurological:  Positive for sensory change and weakness.   Endo/Heme/Allergies: Negative.    Psychiatric/Behavioral:  The patient is nervous/anxious.    All other systems reviewed and are negative.       Physical Exam  Temp:  [36.1 °C (97 °F)-36.6 °C (97.8 °F)] 36.2 °C (97.2 °F)  Pulse:  [54-78] 75  Resp:  [16-20] 18  BP: ()/(55-80) 141/75  SpO2:  [90 %-97 %] 93 %    Physical Exam  Vitals and nursing note reviewed.   Constitutional:       Appearance: He is well-developed.      Comments: Pt seen and examined.   HENT:      Head: Normocephalic and atraumatic.   Eyes:      Pupils: Pupils are equal, round, and reactive to light.   Cardiovascular:      Rate and Rhythm: Normal rate and regular rhythm.      Heart sounds: Normal heart sounds.   Pulmonary:      Effort: Pulmonary effort is  normal.      Breath sounds: Normal breath sounds.   Abdominal:      General: Bowel sounds are normal.      Palpations: Abdomen is soft.   Musculoskeletal:         General: Normal range of motion.      Cervical back: Normal range of motion and neck supple.   Skin:     General: Skin is warm and dry.   Neurological:      General: No focal deficit present.      Mental Status: He is alert and oriented to person, place, and time.      Sensory: Sensory deficit present.      Motor: Weakness present.   Psychiatric:         Behavior: Behavior normal.         Fluids    Intake/Output Summary (Last 24 hours) at 6/6/2025 1625  Last data filed at 6/6/2025 1200  Gross per 24 hour   Intake 760 ml   Output 2025 ml   Net -1265 ml        Laboratory  Recent Labs     06/04/25  0500 06/05/25  0452 06/06/25  0426   WBC 8.0 4.9 5.5   RBC 5.74 5.17 5.24   HEMOGLOBIN 15.9 14.7 15.1   HEMATOCRIT 47.8 44.9 44.5   MCV 83.3 86.8 84.9   MCH 27.7 28.4 28.8   MCHC 33.3 32.7 33.9   RDW 39.8 41.8 41.1   PLATELETCT 252 186 189   MPV 10.0 10.2 10.1     Recent Labs     06/04/25  0500 06/05/25  0452 06/06/25  0426   SODIUM 137 138 137   POTASSIUM 4.1 4.5 4.3   CHLORIDE 103 107 106   CO2 20 18* 17*   GLUCOSE 135* 116* 114*   BUN 37* 37* 44*   CREATININE 1.92* 1.69* 1.81*   CALCIUM 9.6 9.2 9.3                   Imaging  MR-BRAIN-W/O   Final Result         Acute infarct in the left thalamus.      Multiple remote infarcts in the bilateral frontal and left occipital regions.      Age-related volume loss and chronic microvascular ischemic changes.      EC-ECHOCARDIOGRAM COMPLETE W/O CONT   Final Result      MR-BRAIN-W/O   Final Result         No acute process. Age-related volume loss and chronic microvascular ischemic changes. 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      CT-CTA NECK WITH & W/O-POST PROCESSING   Final Result         1. Atherosclerosis with approximately 50% diameter stenosis of the proximal right  "internal carotid artery and less than 50% diameter stenosis of the left internal carotid.   2. Moderate stenosis of the vertebral artery origins bilaterally.      CT-CTA HEAD WITH & W/O-POST PROCESS   Final Result      1.  Atherosclerosis with moderate stenoses of the distal internal carotid arteries.   2.  High-grade stenosis of the left posterior cerebral artery.   3.  No large vessel occlusion or aneurysm.   4.  Atrophy, chronic white matter disease, and chronic infarcts.      CT-CEREBRAL PERFUSION ANALYSIS   Final Result      1. Cerebral blood flow less than 30% possibly representing completed infarct = 19 mL. Based on distribution of this finding, this is possibly artifact.      2. T Max more than 6 seconds possibly representing combination of completed infarct and ischemia = 162 mL. Based on the distribution of this finding, this is possibly artifact.      3. Mismatched volume possibly representing ischemic brain/penumbra= 143 mL      4.  Please note that this cerebral perfusion study and report is Quantitative and targets supratentorial (cerebral) perfusion for evaluation of large vessel territory acute ischemia/infarction. For example, lacunar infarcts, and brainstem/posterior fossa    ischemia/infarction are not evaluated on this study.  Data acquisition is subject to artifacts which can yield non-anatomically plausible perfusion maps which may be due to motion, bolus timing, signal to noise ratio, or other technical factors.    Perfusion map abnormalities which show non-anatomic distributions are likely artifact.   This study is not \"stand-alone\" and should only be utilized for diagnosis, management/treatment in correlation with CT, CTA, and/or MRI and clinical factors.              Assessment/Plan  * Acute CVA (cerebrovascular accident) (HCC)- (present on admission)  Assessment & Plan  Presenting with right upper extremity weakness and disequilibrium  CTA with high-grade stenosis of left PCA  Ischemic " penumbra on CTP  Neurology consulting, MRI confirmed an acute CVA  DAPT per neuro team  Prior statin intolerance, trial Crestor for LDL less than 70  Echo and MRI completed  PT/OT/SLP  S/p treatment with blood pressure augmentation, currently improving,    Prostate cancer (HCC)  Assessment & Plan  Ongoing outpatient treatment    Hypertensive urgency  Assessment & Plan  Improved, further adjust medication regimen to goal systolic pressures      Chronic kidney disease- (present on admission)  Assessment & Plan  Monitor avoid nephrotoxins    Benign essential hypertension- (present on admission)  Assessment & Plan  Monitor closely and titrate blood pressure medication for current goal systolic pressure between 110 and 140  Avoid hypotension secondary to intracranial vascular stenosis    CAD (coronary artery disease)- (present on admission)  Assessment & Plan  Patient now on aspirin, Effient    Plan  6/6  The patient will work on significantly improving  Maintain current blood pressure goal systolic 110-140  Continue to monitor and titrate medication  The patient might need some home regimen to react and be able to treat if his blood pressure at home is outside of these parameters  Close neurology follow-up  See orders  Patient is has a high medical complexity, complex decision making and is at high risk for complication, morbidity, and mortality.  I spent 57 minutes, reviewing the chart, obtaining and/or reviewing separately obtained history. Performing a medically appropriate examination and evaluation.  Counseling and educating the patient. Ordering and reviewing medications, tests, or procedures.   Documenting clinical information in EPIC. Independently interpreting results and communicating results to patient. Discussing future disposition of care with patient, RN and case management.    VTE prophylaxis: Lovenox    I have performed a physical exam and reviewed and updated ROS and Plan today (6/6/2025). In review of  yesterday's note (6/5/2025), there are no changes except as documented above.        Please note that this dictation was created using voice recognition software. I have made every reasonable attempt to correct obvious errors, but I expect that there are errors of grammar and possibly context that I did not discover before finalizing the note.

## 2025-06-06 NOTE — CARE PLAN
The patient is Stable - Low risk of patient condition declining or worsening    Shift Goals  Clinical Goals: stable q2 neuro checks, -140  Patient Goals: rest. go humberto tomorrow  Family Goals: jyotsna    Progress made toward(s) clinical / shift goals:      Problem: Knowledge Deficit - Stroke Education  Goal: Patient's knowledge of stroke and risk factors will improve  Outcome: Progressing     Problem: Neuro Status  Goal: Neuro status will remain stable or improve  Outcome: Progressing     Problem: Hemodynamic Monitoring  Goal: Patient's hemodynamics, fluid balance and neurologic status will be stable or improve  Outcome: Progressing     Problem: Mobility - Stroke  Goal: Patient's capacity to carry out activities will improve  Outcome: Progressing       Patient is not progressing towards the following goals:

## 2025-06-06 NOTE — PROGRESS NOTES
Neurology Progress Note  Neurohospitalist Service, [facility]    Referring Physician: Laci Mederos M.D.    Chief Complaint   Patient presents with    Possible Stroke     Pt BIB EMS from [facility]. Per report pt has had vertigo for the past 4 days, RLE/RUE decreased sensation and ended up spilling a coffee cup due to weakness in right hand. Pt reports 1200 to have had right leg weakness and ataxia. Pt comes to [facility] for further eval, pt reporting most symptoms to have resolved.     Ear Pain    Hypertension       HPI: Refer to initial documented Neurology H&P, as detailed in the patient's chart.    Interval History 6/6: No events overnight.    Review of systems: In addition to what is detailed in the HPI and/or updated in the interval history, all other systems reviewed and are negative.    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    He has no past medical history of Anesthesia, Anginal syndrome (HCC), Arrhythmia, Asthma, At risk for sleep apnea, Blood clotting disorder (HCC), Breath shortness, Bronchitis, Cancer (HCC), Cataract, Cold, Congestive heart failure (HCC), Dental disorder, Emphysema of lung (HCC), Glaucoma, Heart burn, Heart murmur, Heart valve disease, Hemorrhagic disorder (HCC), Hepatitis A, Hepatitis B, Hepatitis C, Indigestion, Pacemaker, Pneumonia, Psychiatric problem, Rheumatic fever, Seizure (HCC), Sleep apnea, Snoring, or Urinary incontinence.    FHx:  family history includes Cancer in his father and mother.    SHx:   reports that he has never smoked. He has never used smokeless tobacco. He reports current alcohol  use. He reports that he does not use drugs.    Medications:  Current Medications[1]    Physical Examination:     Vitals:    06/06/25 0400 06/06/25 0415 06/06/25 0600 06/06/25 0700   BP: 137/65 123/59 127/58 119/58   Pulse: 68 78 64 72   Resp:   20 18   Temp: 36.6 °C (97.8 °F)  36.3 °C (97.4 °F)    TempSrc: Temporal  Temporal    SpO2: 94% 94% 93% 94%   Weight:       Height:               NEUROLOGICAL EXAM:     Patient is awake alert [place] x 4.  Speech is intact.  Able to repeat and follows commands.  Face is symmetrical.  Pupils equal reactive.  Extract muscles intact.  Hearing intact.  Visual fields intact.  Sustained anticoagulant in all 4.  Slight decrease sensation to soft touch in the right hand compared to left.  No obvious signs ataxia on finger-nose testing.    Objective Data:    Labs:  Lab Results   Component Value Date/Time    PROTHROMBTM 14.1 06/02/2025 05:45 PM    INR 1.08 06/02/2025 05:45 PM      Lab Results   Component Value Date/Time    WBC 5.5 06/06/2025 04:26 AM    RBC 5.24 06/06/2025 04:26 AM    HEMOGLOBIN 15.1 06/06/2025 04:26 AM    HEMATOCRIT 44.5 06/06/2025 04:26 AM    MCV 84.9 06/06/2025 04:26 AM    MCH 28.8 06/06/2025 04:26 AM    MCHC 33.9 06/06/2025 04:26 AM    MPV 10.1 06/06/2025 04:26 AM    NEUTSPOLYS 67.10 06/02/2025 05:45 PM    NEUTSPOLYS 64 06/02/2025 02:24 PM    LYMPHOCYTES 17.70 (L) 06/02/2025 05:45 PM    LYMPHOCYTES 18 (L) 06/02/2025 02:24 PM    MONOCYTES 8.30 06/02/2025 05:45 PM    MONOCYTES 10 06/02/2025 02:24 PM    EOSINOPHILS 5.40 06/02/2025 05:45 PM    EOSINOPHILS 6 06/02/2025 02:24 PM    BASOPHILS 1.10 06/02/2025 05:45 PM    BASOPHILS 1 06/02/2025 02:24 PM      Lab Results   Component Value Date/Time    SODIUM 137 06/06/2025 04:26 AM    POTASSIUM 4.3 06/06/2025 04:26 AM    CHLORIDE 106 06/06/2025 04:26 AM    CO2 17 (L) 06/06/2025 04:26 AM    GLUCOSE 114 (H) 06/06/2025 04:26 AM    BUN 44 (H) 06/06/2025 04:26 AM    CREATININE 1.81 (H) 06/06/2025 04:26 AM      Lab Results    Component Value Date/Time    CHOLSTRLTOT 169 06/03/2025 04:18 AM    LDL 95 06/03/2025 04:18 AM    HDL 36 (A) 06/03/2025 04:18 AM    TRIGLYCERIDE 192 (H) 06/03/2025 04:18 AM       Lab Results   Component Value Date/Time    ALKPHOSPHAT 66 06/06/2025 04:26 AM    ASTSGOT 19 06/06/2025 04:26 AM    ALTSGPT 10 06/06/2025 04:26 AM    TBILIRUBIN 0.4 06/06/2025 04:26 AM        Imaging/Testing:  MR-BRAIN-W/O   Final Result         Acute infarct in the left thalamus.      Multiple remote infarcts in the bilateral frontal and left occipital regions.      Age-related volume loss and chronic microvascular ischemic changes.      EC-ECHOCARDIOGRAM COMPLETE W/O CONT   Final Result      MR-BRAIN-W/O   Final Result         No acute process. Age-related volume loss and chronic microvascular ischemic changes. 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      CT-CTA NECK WITH & W/O-POST PROCESSING   Final Result         1. Atherosclerosis with approximately 50% diameter stenosis of the proximal right internal carotid artery and less than 50% diameter stenosis of the left internal carotid.   2. Moderate stenosis of the vertebral artery origins bilaterally.      CT-CTA HEAD WITH & W/O-POST PROCESS   Final Result      1.  Atherosclerosis with moderate stenoses of the distal internal carotid arteries.   2.  High-grade stenosis of the left posterior cerebral artery.   3.  No large vessel occlusion or aneurysm.   4.  Atrophy, chronic white matter disease, and chronic infarcts.      CT-CEREBRAL PERFUSION ANALYSIS   Final Result      1. Cerebral blood flow less than 30% possibly representing completed infarct = 19 mL. Based on distribution of this finding, this is possibly artifact.      2. T Max more than 6 seconds possibly representing combination of completed infarct and ischemia = 162 mL. Based on the distribution of this finding, this is possibly artifact.      3. Mismatched volume possibly  "representing ischemic brain/penumbra= 143 mL      4.  Please note that this cerebral perfusion study and report is Quantitative and targets supratentorial (cerebral) perfusion for evaluation of large vessel territory acute ischemia/infarction. For example, lacunar infarcts, and brainstem/posterior fossa    ischemia/infarction are not evaluated on this study.  Data acquisition is subject to artifacts which can yield non-anatomically plausible perfusion maps which may be due to motion, bolus timing, signal to noise ratio, or other technical factors.    Perfusion map abnormalities which show non-anatomic distributions are likely artifact.   This study is not \"stand-alone\" and should only be utilized for diagnosis, management/treatment in correlation with CT, CTA, and/or MRI and clinical factors.               Assessment and Plan:    85-year-old male presenting with right-sided weakness found to have a left P2 high-grade stenosis.  MRI brain did not reveal a acute infarct.  However this lesion appears to be pressure dependent.  Patient developed right extremity weakness with pressures in the 90 systolic yesterday.  Symptoms improved after getting the pressure above 130 yesterday.  Currently overnight the been running in the 160s to 180s.  Plan today to slowly decrease the pressure between 120-150.  Going to restart him on effient for DAPT therapy given the symptomatic intracranial stenosis.  Patient currently on Xtandi for prostate cancer which limits medication options due to interactions.    Update 6/5.  Repeat MRI of brain confirmed a left thalamic infarct.  Confirming that the P2 stenosis is symptomatic.  Therefore warranting DAPT therapy for 90 days.  This is complicated by his prostate medication of Xtandi.  We chose effient as a second antiplatelet due to least possibility of interactions with Xtandi.  Compared to Plavix and Brilinta.  He will need to be on this for 90 days at least.  After which we can " discontinue the Effient.  Maintain slightly higher blood pressures    Avoid hypotension.    Update 6/6.  Patient had recurrent symptoms with pressures around 102 systolic yesterday.  I will raise the baseline already given more cushion for the blood pressure parameters.  Keep between 120s to 140s systolic.   Continue these parameters for the next 4  weeks.  After which can slowly normalize blood pressure.  In the meantime continue dual therapy with aspirin 81 mg daily and Effient 10 mg daily for 90 days.  After which discontinue the effient    Plan:  1.  Continue aspirin 80 mg daily and Effient 10 mg daily for 90 days.  After which continue aspirin 81 mg daily monotherapy.  2.  Blood pressure parameters between 120-140 systolic   for 4 weeks then normalize.  3.  Repeat MRI brain confirmed acute left thalamic infarct.    4.  Maintain an LDL below 70 with a statin.  5.  Follow-up in vascular clinic.    Vascular neurology will sign off this time.                This chart was partially generated using voice recognition technology and sound alike word replacement may be present, best efforts were made to make the chart accurate.    Juan José Mccurdy MD  Board Certified Neurology, ABPN  (t) [phone]         [1]   Current Facility-Administered Medications:     hydrALAZINE (Apresoline) injection 10-20 mg, 10-20 mg, Intravenous, Q2HRS PRN, Ifeoma SEPULVEDA Latvic, 10 mg at 06/06/25 0334    prasugrel (Effient) tablet 10 mg, 10 mg, Oral, DAILY, Juan José Mccurdy M.D., 10 mg at 06/06/25 0514    labetalol (Normodyne/Trandate) injection 10-20 mg, 10-20 mg, Intravenous, Q4HRS PRN, Juan José Mccurdy M.D., 10 mg at 06/06/25 0509    midodrine (Proamatine) tablet 10 mg, 10 mg, Oral, TID WITH MEALS, Laci Mederos M.D., 10 mg at 06/06/25 0850    aspirin EC tablet 81 mg, 81 mg, Oral, DAILY, Chad Keen M.D., 81 mg at 06/06/25 0514    acetaminophen (Tylenol) tablet 650 mg, 650 mg, Oral, Q6HRS PRN, Chad Keen M.D.     senna-docusate (Pericolace Or Senokot S) 8.6-50 MG per tablet 2 Tablet, 2 Tablet, Oral, Q EVENING **AND** polyethylene glycol/lytes (Miralax) Packet 1 Packet, 1 Packet, Oral, QDAY PRN, Chad Keen M.D.    ondansetron (Zofran) syringe/vial injection 4 mg, 4 mg, Intravenous, Q4HRS PRN **OR** ondansetron (Zofran ODT) dispertab 4 mg, 4 mg, Oral, Q4HRS PRN, Chad Keen M.D.    enoxaparin (Lovenox) inj 40 mg, 40 mg, Subcutaneous, DAILY AT 1800, Chad Keen M.D., 40 mg at 06/05/25 1735    rosuvastatin (Crestor) tablet 40 mg, 40 mg, Oral, Q EVENING, Chad Keen M.D., 40 mg at 06/05/25 1734    MD Alert...ICU Electrolyte Replacement per Pharmacy, , Other, PHARMACY TO DOSE, Ankit Barraza M.D.

## 2025-06-07 ENCOUNTER — PHARMACY VISIT (OUTPATIENT)
Dept: PHARMACY | Facility: MEDICAL CENTER | Age: 85
End: 2025-06-07
Payer: COMMERCIAL

## 2025-06-07 VITALS
RESPIRATION RATE: 17 BRPM | HEART RATE: 71 BPM | OXYGEN SATURATION: 93 % | SYSTOLIC BLOOD PRESSURE: 149 MMHG | TEMPERATURE: 98.1 F | WEIGHT: 199.52 LBS | HEIGHT: 70 IN | DIASTOLIC BLOOD PRESSURE: 83 MMHG | BODY MASS INDEX: 28.56 KG/M2

## 2025-06-07 LAB
ANION GAP SERPL CALC-SCNC: 13 MMOL/L (ref 7–16)
BUN SERPL-MCNC: 39 MG/DL (ref 8–22)
CALCIUM SERPL-MCNC: 9.5 MG/DL (ref 8.5–10.5)
CHLORIDE SERPL-SCNC: 108 MMOL/L (ref 96–112)
CO2 SERPL-SCNC: 19 MMOL/L (ref 20–33)
CREAT SERPL-MCNC: 1.57 MG/DL (ref 0.5–1.4)
ERYTHROCYTE [DISTWIDTH] IN BLOOD BY AUTOMATED COUNT: 42.9 FL (ref 35.9–50)
GFR SERPLBLD CREATININE-BSD FMLA CKD-EPI: 43 ML/MIN/1.73 M 2
GLUCOSE SERPL-MCNC: 114 MG/DL (ref 65–99)
HCT VFR BLD AUTO: 46.2 % (ref 42–52)
HGB BLD-MCNC: 15.1 G/DL (ref 14–18)
MAGNESIUM SERPL-MCNC: 2.4 MG/DL (ref 1.5–2.5)
MCH RBC QN AUTO: 28.4 PG (ref 27–33)
MCHC RBC AUTO-ENTMCNC: 32.7 G/DL (ref 32.3–36.5)
MCV RBC AUTO: 87 FL (ref 81.4–97.8)
PHOSPHATE SERPL-MCNC: 3.6 MG/DL (ref 2.5–4.5)
PLATELET # BLD AUTO: 199 K/UL (ref 164–446)
PMV BLD AUTO: 10.2 FL (ref 9–12.9)
POTASSIUM SERPL-SCNC: 4.4 MMOL/L (ref 3.6–5.5)
RBC # BLD AUTO: 5.31 M/UL (ref 4.7–6.1)
SODIUM SERPL-SCNC: 140 MMOL/L (ref 135–145)
WBC # BLD AUTO: 5.6 K/UL (ref 4.8–10.8)

## 2025-06-07 PROCEDURE — 700102 HCHG RX REV CODE 250 W/ 637 OVERRIDE(OP): Performed by: PSYCHIATRY & NEUROLOGY

## 2025-06-07 PROCEDURE — RXMED WILLOW AMBULATORY MEDICATION CHARGE: Performed by: STUDENT IN AN ORGANIZED HEALTH CARE EDUCATION/TRAINING PROGRAM

## 2025-06-07 PROCEDURE — 700102 HCHG RX REV CODE 250 W/ 637 OVERRIDE(OP): Performed by: HOSPITALIST

## 2025-06-07 PROCEDURE — 84100 ASSAY OF PHOSPHORUS: CPT

## 2025-06-07 PROCEDURE — 83735 ASSAY OF MAGNESIUM: CPT

## 2025-06-07 PROCEDURE — A9270 NON-COVERED ITEM OR SERVICE: HCPCS | Performed by: PSYCHIATRY & NEUROLOGY

## 2025-06-07 PROCEDURE — 700102 HCHG RX REV CODE 250 W/ 637 OVERRIDE(OP): Performed by: INTERNAL MEDICINE

## 2025-06-07 PROCEDURE — 85027 COMPLETE CBC AUTOMATED: CPT

## 2025-06-07 PROCEDURE — 80048 BASIC METABOLIC PNL TOTAL CA: CPT

## 2025-06-07 PROCEDURE — A9270 NON-COVERED ITEM OR SERVICE: HCPCS | Performed by: HOSPITALIST

## 2025-06-07 PROCEDURE — A9270 NON-COVERED ITEM OR SERVICE: HCPCS | Performed by: INTERNAL MEDICINE

## 2025-06-07 PROCEDURE — 99239 HOSP IP/OBS DSCHRG MGMT >30: CPT | Performed by: STUDENT IN AN ORGANIZED HEALTH CARE EDUCATION/TRAINING PROGRAM

## 2025-06-07 RX ORDER — ROSUVASTATIN CALCIUM 40 MG/1
40 TABLET, COATED ORAL EVERY EVENING
Qty: 100 TABLET | Refills: 3 | Status: SHIPPED | OUTPATIENT
Start: 2025-06-07 | End: 2026-07-12

## 2025-06-07 RX ORDER — MIDODRINE HYDROCHLORIDE 5 MG/1
5 TABLET ORAL 3 TIMES DAILY PRN
Qty: 30 TABLET | Refills: 0 | Status: SHIPPED | OUTPATIENT
Start: 2025-06-07

## 2025-06-07 RX ORDER — PRASUGREL 10 MG/1
10 TABLET, FILM COATED ORAL DAILY
Qty: 90 TABLET | Refills: 0 | Status: SHIPPED | OUTPATIENT
Start: 2025-06-08 | End: 2025-09-05

## 2025-06-07 RX ADMIN — MIDODRINE HYDROCHLORIDE 5 MG: 5 TABLET ORAL at 08:04

## 2025-06-07 RX ADMIN — MIDODRINE HYDROCHLORIDE 5 MG: 5 TABLET ORAL at 15:18

## 2025-06-07 RX ADMIN — ASPIRIN 81 MG: 81 TABLET, COATED ORAL at 05:24

## 2025-06-07 RX ADMIN — PRASUGREL 10 MG: 10 TABLET, FILM COATED ORAL at 05:24

## 2025-06-07 ASSESSMENT — PAIN DESCRIPTION - PAIN TYPE: TYPE: ACUTE PAIN

## 2025-06-07 ASSESSMENT — FIBROSIS 4 INDEX: FIB4 SCORE: 2.7

## 2025-06-07 NOTE — PROGRESS NOTES
Monitor Summary: SB-SR 59-77, AK .16, QRS .08, QT .36 with frequent PVC,rare PAC,trigem per strip from monitor room

## 2025-06-07 NOTE — DISCHARGE SUMMARY
Discharge Summary    CHIEF COMPLAINT ON ADMISSION  Chief Complaint   Patient presents with    Possible Stroke     Pt BIB EMS from [facility]. Per report pt has had vertigo for the past 4 days, RLE/RUE decreased sensation and ended up spilling a coffee cup due to weakness in right hand. Pt reports 1200 to have had right leg weakness and ataxia. Pt comes to [facility] for further eval, pt reporting most symptoms to have resolved.     Ear Pain    Hypertension       Reason for Admission  EMS     Admission Date  6/2/2025    CODE STATUS  Full Code    HPI & HOSPITAL COURSE  This is a 85-year-old male with past med history of coronary artery disease, dyslipidemia, hypertension, CKD, prior history of CVA with residual facial paresthesia on ASA daily therapy, prostate cancer on Xtandi, who presented on 6/2/2025 as a transfer from [facility] emergency department with 5 days of noticing trouble with his balance, disequilibrium, L ear pain and acute weakness and incoordination of R hand and arm.   Of note, he is compliant on daily ASA therapy. He had an atorvastatin intolerance in the past. He was previously on birlinta, but it appears this was discontinued due to interaction with his anti-androgen therapy for his prostate cancer treatment.     He presented to [facility] ER. At time of ER arrival, his symptoms had resolved. A CT angiogram of the head revealed high-grade stenosis of the left posterior cerebral artery. Telestroke services deferred acute interventions such as thrombolytic therapy given resolution of symptoms. He was transferred to [facility] for upper level of care.     Neurology was consulted. L P2 high grade stenosis appears flow-limiting. Case was discussed with Neuro-IR, given the relatively diminutive caliber of this vessel, stenting and/or angioplasty is technically challenging, and portends a high risk for catastrophic intraoperative complications. Will pursue medical management for symptomatic intracranial  atherosclerosis- which includes DAPT, high intensity statin, and short-term acute hypertensive response. He was initially admitted to ICU for close neuromonitoring while slowly normalizing blood pressure. He is started on Prasugrel 10mg daily (least interaction with xtandi compared to clopidogrel or ticagrelor). He is continued with ASA 81mg daily.   MRI confirmed Acute infarct in the left thalamus.     Neurology recommends to continue aspirin 80 mg daily and Effient 10 mg daily for 90 days. After which continue aspirin 81 mg daily monotherapy. Blood pressure parameters between 120-140 systolic  for 4 weeks then normalize. He is on midodrine prn for SBP<120. Home med losartan was discontinued.     The patient overall improving, has been more steady and his strength and sensation has been improving. PT recommends outpatient PT.     Therefore, he is discharged in good and stable condition to home with close outpatient follow-up.    The patient met 2-midnight criteria for an inpatient stay at the time of discharge.    Discharge Date  6/7/25    FOLLOW UP ITEMS POST DISCHARGE  PCP  [facility]    DISCHARGE DIAGNOSES  Principal Problem:    Acute CVA (cerebrovascular accident) (HCC) (POA: Yes)  Active Problems:    CAD (coronary artery disease) (POA: Yes)      Overview: Asymptomatic    Benign essential hypertension (Chronic) (POA: Yes)    Chronic kidney disease (POA: Yes)    Hypertensive urgency (POA: Unknown)    Acute ischemic stroke (HCC) (POA: Yes)    Prostate cancer (HCC) (POA: Unknown)  Resolved Problems:    * No resolved hospital problems. *      FOLLOW UP  No future appointments.  Shamir Lemus M.D.  [address] [facility] [address] [phone]    Follow up in 1 week(s)        MEDICATIONS ON DISCHARGE     Medication List        START taking these medications        Instructions   midodrine 5 MG Tabs  Commonly known as: Proamatine   Take 1 Tablet by mouth 3 times a day as needed (if  SBP<120.).  Dose: 5 mg     prasugrel 10 MG Tabs  Commonly known as: Effient   Take 1 Tablet by mouth every day for 87 days.  Dose: 10 mg     rosuvastatin 40 MG tablet  Commonly known as: Crestor   Take 1 Tablet by mouth every evening.  Dose: 40 mg            CONTINUE taking these medications        Instructions   aspirin 81 MG EC tablet   Take 81 mg by mouth every day.  Dose: 81 mg     calcium citrate 950 (200 Ca) MG Tabs  Commonly known as: Calcitrate   Take 950 mg by mouth every day.  Dose: 950 mg     cyanocobalamin 500 MCG Tabs  Commonly known as: Vitamin B-12   Take 500 mcg by mouth every day.  Dose: 500 mcg     Vitamin C 1000 MG Tabs   Take 1,000 mg by mouth every day.  Dose: 1,000 mg     Vitamin D 50 MCG (2000 UT) Caps   Take 2,000 Units by mouth every day.  Dose: 2,000 Units     Xtandi 40 MG Tabs  Generic drug: Enzalutamide   Take 160 mg by mouth every evening. 40 mg x 4 tablets = 160 mg  Dose: 160 mg            STOP taking these medications      losartan 100 MG Tabs  Commonly known as: Cozaar              Allergies  Allergies[1]    DIET  Orders Placed This Encounter   Procedures    Diet Order Diet: Cardiac     Standing Status:   Standing     Number of Occurrences:   1     Diet::   Cardiac [6]       ACTIVITY  As tolerated.  Weight bearing as tolerated    CONSULTATIONS  Neurology  ICU    PROCEDURES  na    LABORATORY  Lab Results   Component Value Date    SODIUM 140 06/07/2025    POTASSIUM 4.4 06/07/2025    CHLORIDE 108 06/07/2025    CO2 19 (L) 06/07/2025    GLUCOSE 114 (H) 06/07/2025    BUN 39 (H) 06/07/2025    CREATININE 1.57 (H) 06/07/2025        Lab Results   Component Value Date    WBC 5.6 06/07/2025    HEMOGLOBIN 15.1 06/07/2025    HEMATOCRIT 46.2 06/07/2025    PLATELETCT 199 06/07/2025      MR-BRAIN-W/O   Final Result         Acute infarct in the left thalamus.      Multiple remote infarcts in the bilateral frontal and left occipital regions.      Age-related volume loss and chronic microvascular  ischemic changes.      EC-ECHOCARDIOGRAM COMPLETE W/O CONT   Final Result      MR-BRAIN-W/O   Final Result         No acute process. Age-related volume loss and chronic microvascular ischemic changes. 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      CT-CTA NECK WITH & W/O-POST PROCESSING   Final Result         1. Atherosclerosis with approximately 50% diameter stenosis of the proximal right internal carotid artery and less than 50% diameter stenosis of the left internal carotid.   2. Moderate stenosis of the vertebral artery origins bilaterally.      CT-CTA HEAD WITH & W/O-POST PROCESS   Final Result      1.  Atherosclerosis with moderate stenoses of the distal internal carotid arteries.   2.  High-grade stenosis of the left posterior cerebral artery.   3.  No large vessel occlusion or aneurysm.   4.  Atrophy, chronic white matter disease, and chronic infarcts.      CT-CEREBRAL PERFUSION ANALYSIS   Final Result      1. Cerebral blood flow less than 30% possibly representing completed infarct = 19 mL. Based on distribution of this finding, this is possibly artifact.      2. T Max more than 6 seconds possibly representing combination of completed infarct and ischemia = 162 mL. Based on the distribution of this finding, this is possibly artifact.      3. Mismatched volume possibly representing ischemic brain/penumbra= 143 mL      4.  Please note that this cerebral perfusion study and report is Quantitative and targets supratentorial (cerebral) perfusion for evaluation of large vessel territory acute ischemia/infarction. For example, lacunar infarcts, and brainstem/posterior fossa    ischemia/infarction are not evaluated on this study.  Data acquisition is subject to artifacts which can yield non-anatomically plausible perfusion maps which may be due to motion, bolus timing, signal to noise ratio, or other technical factors.    Perfusion map abnormalities which show  "non-anatomic distributions are likely artifact.   This study is not \"stand-alone\" and should only be utilized for diagnosis, management/treatment in correlation with CT, CTA, and/or MRI and clinical factors.             Total time of the discharge process 33 minutes.         [1]   Allergies  Allergen Reactions    Clopidogrel Unspecified     Muscle weakness and fatigue     Lisinopril      Muscle problems    Atorvastatin Myalgia     Muscle problems       "

## 2025-06-07 NOTE — PROGRESS NOTES
Assumed care of patient at 1230. Received bedside report from WOODROW Erickson. Patient resting comfortably at this time. No complaints of pain and no needs noted. Hourly rounding continues.

## 2025-06-07 NOTE — NON-PROVIDER
Daily Progress Note    Date of Service  6/7/2025    Chief Complaint  Yogesh Orozco is a 85 y.o. male admitted 6/2/2025 with initial complaints of right sided weakness, vertigo and dizziness    Hospital Course  Yogesh Orozco is an 86 yo male with a past medical history of coronary artery disease, dyslipidemia, hypertension, CKD, STEMI, arthritis, PFO, polio, and prior history of CVA with no residual deficits who presented on 06/02/2025 as a stroke transfer from [facility] ED. He reports a 5 day history of feeling off balance and unsteady on his feet, as well as left ear pain, which he attributed to imbalance. On 06/02, he was picking up his coffee and experienced acute weakness and incoordination of his right hand and arm. His wife saw him having difficulty walking and took him to the local ED for evaluation. His symptoms resolved at the time of ED arrival.     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 Thrombolytic therapy was deferred due to resolution of symptoms. He was transferred to [facility] for possible endovascular intervention (angioplasty and/or stenting of possible symptomatic lesion). He reports a prior history of stroke with residual facial paraesthesias. He denies smoking and drinking. He is compliant on aspirin 81 mg daily. He has experienced atorvastatin intolerance in the past. He was previously on birlinta, but was discontinued due to interaction with anti-androgen therapy for his prostate cancer treatment Xtandi. SBPs 150-180s when asymptomatic. MRI brain was negative for acute infarct, with the lesion appearing to be pressure dependent given symptoms. He was maintained in the ICU with blood pressure augmentation and neurology was consulted. Repeat MRI brain reveals revealed a left thalamic infarct, confirming significance of P2 lesion. He was subsequently placed on aspirin and Effient due to least potential of interactions with Xtandi. Neurology recommended goal -140 to maintain LDL  <70. He noted improvements in his symptoms.     Patient was transferred from ICU to floor for further monitoring due to fluctuating blood pressures.     Interval Problem Update  - Patient seen and examined at bedside  - Current BP 96//75, goal -140  - Patient is doing well this morning, he states he is doing exercises with his right hand and arm and forcing himself to use it to improve strength and functioning  - He is concerned about not taking Xtandi for the past week, will resume on discharge   - He is also wondering if he is able to use a brace for his right knee due to persistent weakness from years of skiing, interested in PT specifically for his knee as he is already planning to go  - He reports he is doing well on Crestor and has not experienced any adverse reactions    Consultants/Specialty  critical care and neurology    Code Status  Full Code    Disposition  Patient is not medically cleared for discharge.   Anticipate discharge to to home with close outpatient follow-up.    Review of Systems  ROS   Review of Systems   Constitutional:  Positive for malaise/fatigue.   HENT: Negative.     Eyes: Negative.    Respiratory: Negative.     Cardiovascular: Negative.    Gastrointestinal: Negative.    Genitourinary: Negative.    Musculoskeletal: Negative.    Skin: Negative.    Neurological:  Positive for sensory change and weakness.   Endo/Heme/Allergies: Negative.    Psychiatric/Behavioral:  The patient is nervous/anxious.    All other systems reviewed and are negative.    Physical Exam  Temp:  [36.1 °C (97 °F)-36.7 °C (98.1 °F)] 36.5 °C (97.7 °F)  Pulse:  [62-77] 63  Resp:  [16-18] 18  BP: ()/(55-90) 149/90  SpO2:  [91 %-95 %] 93 %    Physical Exam  Vitals and nursing note reviewed.   Constitutional:       Appearance: He is well-developed.    HENT:      Head: Normocephalic and atraumatic.   Eyes:      Pupils: Pupils are equal, round, and reactive to light.   Cardiovascular:      Rate and Rhythm:  Normal rate and regular rhythm.      Heart sounds: Normal heart sounds.   Pulmonary:      Effort: Pulmonary effort is normal.      Breath sounds: Normal breath sounds.   Abdominal:      General: Bowel sounds are normal.      Palpations: Abdomen is soft.   Musculoskeletal:         General: Normal range of motion.      Cervical back: Normal range of motion and neck supple.   Skin:     General: Skin is warm and dry.   Neurological:      General: No focal deficit present.      Mental Status: He is alert and oriented to person, place, and time.      Sensory: Sensory deficit present.      Motor: Weakness present.      Comments: R upper extremity strength 4/5   Psychiatric:         Behavior: Behavior normal.     Fluids    Intake/Output Summary (Last 24 hours) at 6/7/2025 0908  Last data filed at 6/6/2025 1600  Gross per 24 hour   Intake 360 ml   Output 600 ml   Net -240 ml       Laboratory  Recent Labs     06/05/25  0452 06/06/25  0426 06/07/25  0534   WBC 4.9 5.5 5.6   RBC 5.17 5.24 5.31   HEMOGLOBIN 14.7 15.1 15.1   HEMATOCRIT 44.9 44.5 46.2   MCV 86.8 84.9 87.0   MCH 28.4 28.8 28.4   MCHC 32.7 33.9 32.7   RDW 41.8 41.1 42.9   PLATELETCT 186 189 199   MPV 10.2 10.1 10.2     Recent Labs     06/05/25  0452 06/06/25  0426 06/07/25  0534   SODIUM 138 137 140   POTASSIUM 4.5 4.3 4.4   CHLORIDE 107 106 108   CO2 18* 17* 19*   GLUCOSE 116* 114* 114*   BUN 37* 44* 39*   CREATININE 1.69* 1.81* 1.57*   CALCIUM 9.2 9.3 9.5                   Imaging  MR-BRAIN-W/O   Final Result         Acute infarct in the left thalamus.      Multiple remote infarcts in the bilateral frontal and left occipital regions.      Age-related volume loss and chronic microvascular ischemic changes.      EC-ECHOCARDIOGRAM COMPLETE W/O CONT   Final Result      MR-BRAIN-W/O   Final Result         No acute process. Age-related volume loss and chronic microvascular ischemic changes. Remote infarcts in the bilateral frontal region, right cerebellum, left  "occipital region.      CT-CTA NECK WITH & W/O-POST PROCESSING   Final Result         1. Atherosclerosis with approximately 50% diameter stenosis of the proximal right internal carotid artery and less than 50% diameter stenosis of the left internal carotid.   2. Moderate stenosis of the vertebral artery origins bilaterally.      CT-CTA HEAD WITH & W/O-POST PROCESS   Final Result      1.  Atherosclerosis with moderate stenoses of the distal internal carotid arteries.   2.  High-grade stenosis of the left posterior cerebral artery.   3.  No large vessel occlusion or aneurysm.   4.  Atrophy, chronic white matter disease, and chronic infarcts.      CT-CEREBRAL PERFUSION ANALYSIS   Final Result      1. Cerebral blood flow less than 30% possibly representing completed infarct = 19 mL. Based on distribution of this finding, this is possibly artifact.      2. T Max more than 6 seconds possibly representing combination of completed infarct and ischemia = 162 mL. Based on the distribution of this finding, this is possibly artifact.      3. Mismatched volume possibly representing ischemic brain/penumbra= 143 mL      4.  Please note that this cerebral perfusion study and report is Quantitative and targets supratentorial (cerebral) perfusion for evaluation of large vessel territory acute ischemia/infarction. For example, lacunar infarcts, and brainstem/posterior fossa    ischemia/infarction are not evaluated on this study.  Data acquisition is subject to artifacts which can yield non-anatomically plausible perfusion maps which may be due to motion, bolus timing, signal to noise ratio, or other technical factors.    Perfusion map abnormalities which show non-anatomic distributions are likely artifact.   This study is not \"stand-alone\" and should only be utilized for diagnosis, management/treatment in correlation with CT, CTA, and/or MRI and clinical factors.              Assessment/Plan  Yogesh Orozco is an 86 yo male with a " past medical history of coronary artery disease, dyslipidemia, hypertension, CKD, STEMI, arthritis, PFO, polio, and prior history of CVA with no residual deficits who presented on 06/02/2025 as a stroke transfer from [facility].     * Acute CVA (cerebrovascular accident) (HCC)- (present on admission)  Assessment & Plan  Patient presented with right upper extremity weakness and disquilibrium. 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 CTP revealed ischemic penumbra. He is compliant on aspirin 81 mg daily. MRI brain was negative for acute infarct, with the lesion appearing to be pressure dependent given symptoms. Repeat MRI brain reveals revealed a left thalamic infarct, confirming significance of P2 lesion. He was subsequently placed on DAPT aspirin and Effient due to least potential of interactions with Xtandi. Neurology recommended goal -140. He has a history of prior statin intolerance, goal maintain LDL <70. S/p treatment with blood pressure augmentation.  - Continue DAPT aspirin and Effient   - Trial Crestor for goal LDL <70  - PT/OT/SLP     Prostate cancer (HCC)  Assessment & Plan  Ongoing outpatient treatment with Xtandi  - Held acutely while inpatient, resume on discharge      Hypertensive urgency  Assessment & Plan  - Improved, further adjust medication regimen to goal systolic pressures -140     Chronic kidney disease- (present on admission)  Assessment & Plan  - Avoid nephrotoxic medications  - Renally dose medications     Benign essential hypertension- (present on admission)  Assessment & Plan  - Monitor closely   - Titrate blood pressure medication  to goal systolic pressures -140  - Avoid hypotension secondary to intracranial vascular stenosis     CAD (coronary artery disease)- (present on admission)  Assessment & Plan  - Continue DAPT aspirin and Effient     VTE prophylaxis: lovenox    I have performed a physical exam and reviewed and updated ROS and Plan today (6/7/2025). In  review of yesterday's note (6/6/2025), there are no changes except as documented above.

## 2025-06-07 NOTE — DISCHARGE INSTRUCTIONS
Discharge Instructions per Raven Dorsey M.D.  Please check your blood pressure at least once a day.  Your goal of Bp is between 120-140 systolic (top blood pressure reading)  for 4 weeks then normalize.   Please discontinue losartan. If your systolic blood pressure (top reading) less than 120mmHg, please take midodrine 5mg. Repeat Blood pressure in one hour  If your daily blood pressure is over 150, please discuss your family doctor to consider restart low dose of losartan.   Continue ASA 81mg indefinitely  Continue Effient 10 mg daily for total 90 days.   Please follow-up with PCP as outpatient.  Follow up with neurology as outpatient    Return to ER in the event of new or worsening symptoms. Please note importance of compliance and the patient has agreed to proceed with all medical recommendations and follow up plan indicated above. All medications come with benefits and risks. Risks may include permanent injury or death and these risks can be minimized with close reassessment and monitoring. Please make it to your scheduled follow ups with PCP and neurology

## 2025-06-07 NOTE — CARE PLAN
The patient is Stable - Low risk of patient condition declining or worsening    Shift Goals  Clinical Goals: Monitor BP, Monitor Neuro function  Patient Goals: discharge  Family Goals: [email or web address]    Progress made toward(s) clinical / shift goals:  Patient to be discharged home with a FWW and neurology follow up appointment    Patient is not progressing towards the following goals:

## 2025-06-07 NOTE — CARE PLAN
The patient is Stable - Low risk of patient condition declining or worsening    Shift Goals  Clinical Goals: BP control  Patient Goals: feel better  Family Goals: ARETHA    Progress made toward(s) clinical / shift goals:    Problem: Knowledge Deficit - Stroke Education  Goal: Patient's knowledge of stroke and risk factors will improve  Description: Target End Date:  1-3 days or as soon as patient condition allowsDocument in Patient Education1.  Stroke education booklet provided2.  Education regarding EMS activation, need for follow up, medication prescribed at discharge, risk factors for stroke/lifestyle modifications, warning signs and symptoms of stroke provided  Outcome: Progressing     Problem: Neuro Status  Goal: Neuro status will remain stable or improve  Description: Target End Date:  Prior to discharge or change in level of careDocument on Neuro assessment in the Assessment flowsheet1.  Assess and monitor neurologic status per provider order/protocol/unit policy2.  Assess level of consciousness and orientation3.  Assess for speech, dysarthria, dysphagia, facial symmetry4.  Assess visual field, eye movements, gaze preference, pupil reaction and size5.  Assess muscle strength and motor response in all four extremities6.  Assess for sensation (numbness and tingling)7.  Assess basic neuro reflexes (cough, gag, corneal)8.  Identify changes in neuro status and report to provider for testing/treatment orders  Outcome: Progressing     Problem: Hemodynamic Monitoring  Goal: Patient's hemodynamics, fluid balance and neurologic status will be stable or improve  Description: Target End Date:  Prior to discharge or change in level of care1.  Vital signs, pulse oximetry and cardiac monitor per provider order and/or policy2.  Frequent pulse checks performed post thrombectomy3.  Frequent monitoring for signs of bleeding post TPA administration4.  Proper management of IV infusions5.  Intake and output monitored per provider  order6.  Daily weight obtained per unit policy or provider order7.  Peripheral pulses and capillary refill assessed as needed8.  Monitor for signs/symptoms of excessive bleeding9.  Body temperature assessed and fevers [id number]. Patient positioned for maximum circulation/cardiac output  Outcome: Progressing     Problem: Fall Risk  Goal: Patient will remain free from falls  Description: Target End Date:  Prior to discharge or change in level of careDocument interventions on the Videsanthony Mendez Fall Risk Assessment1.  Assess for fall risk factors2.  Implement fall precautions  Outcome: Progressing       Patient is not progressing towards the following goals:

## 2025-06-16 ENCOUNTER — TELEPHONE (OUTPATIENT)
Dept: NEUROLOGY | Facility: MEDICAL CENTER | Age: 85
End: 2025-06-16
Payer: MEDICARE

## 2025-08-06 ENCOUNTER — OFFICE VISIT (OUTPATIENT)
Dept: NEUROLOGY | Facility: MEDICAL CENTER | Age: 85
End: 2025-08-06
Attending: PSYCHIATRY & NEUROLOGY
Payer: MEDICARE

## 2025-08-06 VITALS
DIASTOLIC BLOOD PRESSURE: 80 MMHG | RESPIRATION RATE: 14 BRPM | SYSTOLIC BLOOD PRESSURE: 138 MMHG | OXYGEN SATURATION: 99 % | HEART RATE: 67 BPM | BODY MASS INDEX: 28.66 KG/M2 | TEMPERATURE: 98.6 F | HEIGHT: 70 IN | WEIGHT: 200.18 LBS

## 2025-08-06 DIAGNOSIS — I63.9 ACUTE ISCHEMIC STROKE (HCC): Primary | ICD-10-CM

## 2025-08-06 PROCEDURE — 99213 OFFICE O/P EST LOW 20 MIN: CPT

## 2025-08-06 PROCEDURE — 3079F DIAST BP 80-89 MM HG: CPT | Performed by: PSYCHIATRY & NEUROLOGY

## 2025-08-06 PROCEDURE — 99204 OFFICE O/P NEW MOD 45 MIN: CPT | Performed by: PSYCHIATRY & NEUROLOGY

## 2025-08-06 PROCEDURE — 3075F SYST BP GE 130 - 139MM HG: CPT | Performed by: PSYCHIATRY & NEUROLOGY

## 2025-08-06 RX ORDER — EZETIMIBE 10 MG/1
10 TABLET ORAL DAILY
COMMUNITY
Start: 2025-06-11

## 2025-08-06 ASSESSMENT — PATIENT HEALTH QUESTIONNAIRE - PHQ9: CLINICAL INTERPRETATION OF PHQ2 SCORE: 0

## 2025-08-06 ASSESSMENT — FIBROSIS 4 INDEX: FIB4 SCORE: 2.84
